# Patient Record
Sex: FEMALE | Race: BLACK OR AFRICAN AMERICAN | Employment: OTHER | URBAN - METROPOLITAN AREA
[De-identification: names, ages, dates, MRNs, and addresses within clinical notes are randomized per-mention and may not be internally consistent; named-entity substitution may affect disease eponyms.]

---

## 2017-05-22 ENCOUNTER — HOSPITAL ENCOUNTER (OUTPATIENT)
Dept: MAMMOGRAPHY | Age: 62
Discharge: HOME | End: 2017-05-22
Attending: INTERNAL MEDICINE
Payer: COMMERCIAL

## 2017-05-22 DIAGNOSIS — C50.919 BREAST CA (HCC): ICD-10-CM

## 2017-05-22 PROCEDURE — 77066 DX MAMMO INCL CAD BI: CPT

## 2017-05-22 PROCEDURE — 77067 SCR MAMMO BI INCL CAD: CPT

## 2018-05-23 ENCOUNTER — HOSPITAL ENCOUNTER (OUTPATIENT)
Dept: MAMMOGRAPHY | Age: 63
Discharge: HOME | End: 2018-05-23
Attending: INTERNAL MEDICINE
Payer: COMMERCIAL

## 2018-05-23 DIAGNOSIS — Z86.000 HX OF CARCINOMA IN SITU OF BREAST: ICD-10-CM

## 2018-05-23 DIAGNOSIS — Z12.31 ENCOUNTER FOR SCREENING MAMMOGRAM FOR BREAST CANCER: ICD-10-CM

## 2018-05-23 PROCEDURE — 77063 BREAST TOMOSYNTHESIS BI: CPT

## 2019-03-15 ENCOUNTER — OFFICE VISIT (OUTPATIENT)
Dept: ORTHOPEDIC SURGERY | Age: 64
End: 2019-03-15

## 2019-03-15 VITALS
OXYGEN SATURATION: 98 % | RESPIRATION RATE: 16 BRPM | DIASTOLIC BLOOD PRESSURE: 80 MMHG | SYSTOLIC BLOOD PRESSURE: 134 MMHG | HEART RATE: 67 BPM | HEIGHT: 63 IN | BODY MASS INDEX: 34.91 KG/M2 | WEIGHT: 197 LBS

## 2019-03-15 DIAGNOSIS — M54.16 LUMBAR RADICULOPATHY: Primary | ICD-10-CM

## 2019-03-15 RX ORDER — BACLOFEN 10 MG/1
10 TABLET ORAL 3 TIMES DAILY
Qty: 90 TAB | Refills: 0 | Status: SHIPPED | OUTPATIENT
Start: 2019-03-15 | End: 2022-08-17

## 2019-03-15 RX ORDER — METHYLPREDNISOLONE 4 MG/1
4 TABLET ORAL
Qty: 21 TAB | Refills: 0 | Status: SHIPPED | OUTPATIENT
Start: 2019-03-15 | End: 2019-05-22

## 2019-03-15 NOTE — PROGRESS NOTES
Grey Burkitt  1955   Chief Complaint   Patient presents with    Leg Pain     right leg pain         HISTORY OF PRESENT ILLNESS  Grey Burkitt is a 70 y.o. female who presents today for evaluation of right leg pain. She states it started about 3 weeks ago. Notes it could be from her dancing. Pain goes from her buttock down to her foot. Describes as a dull ache. Worse with activity better when she lays down. Has used icy hot and motrins and oxycodone. Pain is a 10/10.      No Known Allergies     Past Medical History:   Diagnosis Date    Anemia     Arthritis     Cancer ([facility] 2014    breast    Ductal carcinoma in situ of breast     Glaucoma     Hypertension     Seizures ([facility] [address] [facility]     PAST      Social History     Socioeconomic History    Marital status: SINGLE     Spouse name: Not on file    Number of children: Not on file    Years of education: Not on file    Highest education level: Not on file   Social Needs    Financial resource strain: Not on file    Food insecurity - worry: Not on file    Food insecurity - inability: Not on file   [employer] needs - medical: Not on file   [employer] needs - non-medical: Not on file   Occupational History    Not on file   Tobacco Use    Smoking status: Never Smoker    Smokeless tobacco: Never Used   Substance and Sexual Activity    Alcohol use: No     Alcohol/week: 0.0 oz    Drug use: No    Sexual activity: Not on file   Other Topics Concern    Not on file   Social History Narrative    Not on file      Past Surgical History:   Procedure Laterality Date    HX HYSTERECTOMY      partial    HX MASTECTOMY  4/30/2014    RIGHT PARTIAL MASTECTOMY WITH NEEDLE LOCALIZATION MAMMOGRAM SENTINEL LYMPH NODE BIOPSY performed by Romayne Bennetts, MD at [facility] MAIN OR      Family History   Problem Relation Age of Onset    Colon Cancer Mother       Current Outpatient Medications   Medication Sig    baclofen (LIORESAL) 10 mg tablet Take 1 Tab by mouth three (3) times daily.  methylPREDNISolone (MEDROL DOSEPACK) 4 mg tablet Take 1 Tab by mouth Specific Days and Specific Times.  tamoxifen (NOLVADEX) 10 mg tablet Take  by mouth daily.  OTHER Vitamin D Po    nebivolol (BYSTOLIC) 20 mg tablet Take  by mouth daily.  torsemide (DEMADEX) 20 mg tablet Take  by mouth daily.  OTHER Occuvite eye vitamin OTC    cyanocobalamin (VITAMIN B-12) 2,500 mcg sublingual tablet Take 2,500 mcg by mouth daily.  pyridoxine (VITAMIN B-6) 200 mg tablet Take 200 mg by mouth daily.  BIOTIN PO Take 1,000 mg by mouth.  oxyCODONE-acetaminophen (PERCOCET) 5-325 mg per tablet 1 or 2 tablets by mouth every 4 hours as needed     No current facility-administered medications for this visit. REVIEW OF SYSTEM   Patient denies: Weight loss, Fever/Chills, HA, Visual changes, Fatigue, Chest pain, SOB, Abdominal pain, N/V/D/C, Blood in stool or urine, Edema. Pertinent positive as above in HPI. All others were negative    PHYSICAL EXAM:   Visit Vitals  /80   Pulse 67   Resp 16   Ht 5' 3\" (1.6 m)   Wt 197 lb (89.4 kg)   SpO2 98%   BMI 34.90 kg/m²     The patient is a well-developed, well-nourished female   in no acute distress. The patient is alert and oriented times three. The patient is alert and oriented times three. Mood and affect are normal.  LYMPHATIC: lymph nodes are not enlarged and are within normal limits  SKIN: normal in color and non tender to palpation. There are no bruises or abrasions noted. NEUROLOGICAL: Motor sensory exam is within normal limits. Reflexes are equal bilaterally.  There is normal sensation to pinprick and light touch  MUSCULOSKELETAL:  Examination Lumbar   Skin Intact   Tenderness, Paraspinal muscles +   Paraspinal muscle spasms +   Flexion Fingertips to ankle   Extension 10   Knee reflexes Normal   Ankle reflexes Normal   Straight leg raise -   Calf tenderness -         IMAGIN view xray images of lumbar spine on 3/15/2019 read and reviewed by myself reveal significant lumbar degenerative arthritis     IMPRESSION:      ICD-10-CM ICD-9-CM    1. Lumbar radiculopathy M54.16 724.4 AMB POC XRAY, SPINE, LUMBOSACRAL; 2 O      REFERRAL TO PHYSICAL THERAPY        PLAN:   1. Patient with right radicular pain consistent with degenerative changes from back. Medrol dose pack, baclofen, physical therapy  Risk factors include: HTN, BMI>30  2. No cortisone injection indicated today   3. Yes Physical/Occupational Therapy indicated today: lumbar spine  4. No diagnostic test indicated today:   5. No durable medical equipment indicated today  6. No referral indicated today   7. Yes medications indicated today: MEDROL dose pack and baclofen  8.  No Narcotic indicated today      RTC 3 weeks    Follow-up Disposition: Not on [address] [facility]

## 2019-03-18 ENCOUNTER — HOSPITAL ENCOUNTER (OUTPATIENT)
Dept: PHYSICAL THERAPY | Age: 64
Discharge: HOME | End: 2019-03-18
Payer: MEDICARE

## 2019-03-18 PROCEDURE — 97162 PT EVAL MOD COMPLEX 30 MIN: CPT

## 2019-03-18 PROCEDURE — 97110 THERAPEUTIC EXERCISES: CPT

## 2019-03-18 NOTE — PROGRESS NOTES
[facility]  [address]  [place], [address]  [phone] fax    Plan of Care/ Statement of Necessity for Physical Therapy Services    Patient name: Eliud Castillo Start of Care: 3/18/2019   Referral source: Raegan Hermosillo : 1955    Medical Diagnosis: Radiculopathy, lumbar region [M54.16]  Payor: [place] MEDICARE / Plan: [place] MEDICARE PART A & B / Product Type: Medicare /  Onset Date:    Treatment Diagnosis: Left leg pain   Prior Hospitalization: see medical history Provider#: [id number]   Medications: Verified on Patient summary List    Comorbidities: depression, arthritis, visual impairments, cancer, high blood pressure   Prior Level of Function: (I) with all ADLs, and able to walk pain free      The Plan of Care and following information is based on the information from the initial evaluation. Assessment/ key information: Patient is a 70 y.o female presenting with a chief complaint of Right buttock pain that travels all the way down the leg that began a couple of months ago, which she thinks started after doing a lot of dancing. Patient states recent xrays show lumbar arthritis. Patient reports sharp shooting pain with all movement as well as with sitting on the right buttock. Patient reports she has been dealing with depression and recently stopped exercising. Patient with several mood swings throughout session. Patient reports with decreased hip mobility, decreased strength, myofascial restrictions in right glutes, decreased muscular flexibility, and gait abnormalities. Patient will benefit from skilled physical therapy in order to address the above impairments.    Evaluation Complexity History MEDIUM  Complexity : 1-2 comorbidities / personal factors will impact the outcome/ POC ; Examination MEDIUM Complexity : 3 Standardized tests and measures addressing body structure, function, activity limitation and / or participation in recreation  ;Presentation MEDIUM Complexity : Evolving with changing characteristics  ; Clinical Decision Making MEDIUM Complexity : FOTO score of 26-74  Overall Complexity Rating: MEDIUM  Problem List: pain affecting function, decrease ROM, decrease strength, impaired gait/ balance, decrease ADL/ functional abilitiies, decrease activity tolerance, decrease flexibility/ joint mobility and decrease transfer abilities   Treatment Plan may include any combination of the following: Therapeutic exercise, Neuromuscular re-education, Physical agent/modality, Gait/balance training, Manual therapy, Patient education, Functional mobility training and Stair training  Patient / Family readiness to learn indicated by: asking questions, trying to perform skills and interest  Persons(s) to be included in education: patient (P)  Barriers to Learning/Limitations: None  Patient Goal (s): no pain and to walk normal again  Patient Self Reported Health Status: good  Rehabilitation Potential: good    Short Term Goals: To be accomplished in 4 weeks:   1. Patient will be independent and compliant with HEP in order to improve functional mobility. 2. Patient will be able to decrease pain to no more than 5/10 in order to improve ease of ADLs. Long Term Goals: To be accomplished in 8 weeks:   1. Patient will be able to improve FOTO score to 66 in order to demonstrate improvements in functional independence. 2. Patient will be able to improve right hip ER to 30 degrees in order to improve ease of ADLs. 3. Patient will be able to climb a flight of stairs without difficulty in order to return to prior level of function. Frequency / Duration: Patient to be seen 2 times per week for 8 weeks.     Patient/ Caregiver education and instruction: Diagnosis, prognosis, activity modification and exercises   [x]  Plan of care has been reviewed with PTA    Certification Period: 3/18/19 - 5/17/19  Gadiel Dodd, PT 3/18/2019 3:06 PM    ________________________________________________________________________    I certify that the above Therapy Services are being furnished while the patient is under my care. I agree with the treatment plan and certify that this therapy is necessary.     [de-identified] Signature:____________________  Date:____________Time: _________    Please sign and return to [facility] [address] [address] [address] Mallory Alvarez [address] [place] [address] [address]  [phone] fax

## 2019-03-18 NOTE — PROGRESS NOTES
PT DAILY TREATMENT NOTE 10-18    Patient Name: John Dolan  Date:3/18/2019  : 1955  [x]  Patient  Verified  Payor: [place] MEDICARE / Plan: [place] MEDICARE PART A & B / Product Type: Medicare /    In time:2:10  Out time:2:57  Total Treatment Time (min): 52  Visit #: 1 of 16    Medicare/BCBS Only   Total Timed Codes (min):  27 1:1 Treatment Time:  47       Treatment Area: Radiculopathy, lumbar region [M54.16]    SUBJECTIVE  Pain Level (0-10 scale): 9  Any medication changes, allergies to medications, adverse drug reactions, diagnosis change, or new procedure performed?: [x] No    [] Yes (see summary sheet for update)  Subjective functional status/changes:   [] No changes reported  Patient is a 70 y.o female presenting with a chief complaint of Right buttock pain that travels all the way down the leg that began a couple of months ago, which she thinks started after doing a lot of dancing. Patient states recent xrays show lumbar arthritis. Patient reports sharp shooting pain with all movement as well as with sitting on the right buttock. Patient reports she has been dealing with depression and recently stopped exercising. Patient with several mood swings throughout session. OBJECTIVE      20 min [x]Eval                  []Re-Eval       27 min Therapeutic Exercise:  [x] See flow sheet :   Rationale: increase ROM and increase strength to improve the patients ability to perform ADLs with improved ease. With   [] TE   [] TA   [] neuro   [] other: Patient Education: [x] Review HEP    [] Progressed/Changed HEP based on:   [] positioning   [] body mechanics   [] transfers   [] heat/ice application    [] other:      Other Objective/Functional Measures: See IE     Pain Level (0-10 scale) post treatment: 9    ASSESSMENT/Changes in Function: Patient reports with decreased hip mobility, decreased strength, myofascial restrictions in right glutes, decreased muscular flexibility, and gait abnormalities. Patient will benefit from skilled physical therapy in order to address the above impairments. Patient will continue to benefit from skilled PT services to modify and progress therapeutic interventions, address functional mobility deficits, address ROM deficits, address strength deficits, analyze and address soft tissue restrictions, analyze and cue movement patterns, analyze and modify body mechanics/ergonomics and assess and modify postural abnormalities to attain remaining goals. [x]  See Plan of Care  []  See progress note/recertification  []  See Discharge Summary         Progress towards goals / Updated goals:  Short Term Goals: To be accomplished in 4 weeks:              1. Patient will be independent and compliant with HEP in order to improve functional mobility. 2. Patient will be able to decrease pain to no more than 5/10 in order to improve ease of ADLs. Long Term Goals: To be accomplished in 8 weeks:              1. Patient will be able to improve FOTO score to 66 in order to demonstrate improvements in functional independence. 2. Patient will be able to improve right hip ER to 30 degrees in order to improve ease of ADLs. 3. Patient will be able to climb a flight of stairs without difficulty in order to return to prior level of function.      PLAN  []  Upgrade activities as tolerated     [x]  Continue plan of care  []  Update interventions per flow sheet       []  Discharge due to:_  []  Other:_      Armando Flores PT 3/18/2019  3:06 PM    Future Appointments   Date Time Provider Federico Russell   4/16/2019  8:45 AM SRINIVASAN [address]   5/28/2019  7:30 AM HBV ESHA MARTINEZO RM [facility]

## 2019-03-21 ENCOUNTER — HOSPITAL ENCOUNTER (OUTPATIENT)
Dept: PHYSICAL THERAPY | Age: 64
Discharge: HOME | End: 2019-03-21
Payer: MEDICARE

## 2019-03-21 PROCEDURE — 97110 THERAPEUTIC EXERCISES: CPT

## 2019-03-21 PROCEDURE — 97140 MANUAL THERAPY 1/> REGIONS: CPT

## 2019-03-21 PROCEDURE — 97112 NEUROMUSCULAR REEDUCATION: CPT

## 2019-03-21 NOTE — PROGRESS NOTES
PT DAILY TREATMENT NOTE 10-18 Patient Name: Morenita Mcconnell Date:3/21/2019 : 1955 [x]  Patient  Verified Payor: [place] MEDICARE / Plan: Barbara [place] j[place] / Product Type: Medicare / In time:8:22  Out time:9:09 Total Treatment Time (min): 47 Visit #: 2 of 16 Medicare/BCBS Only Total Timed Codes (min):  47 1:1 Treatment Time:  47  
 
 
Treatment Area: Low back pain [M54.5] SUBJECTIVE Pain Level (0-10 scale): 6-7 Any medication changes, allergies to medications, adverse drug reactions, diagnosis change, or new procedure performed?: [x] No    [] Yes (see summary sheet for update) Subjective functional status/changes:   [] No changes reported Pt reports that her pain was around a 9 but \"the medicine is still in me\" so she reports 6-7/10 levels at this time OBJECTIVE Modality rationale: PD to improve the patients ability to Min Type Additional Details  
 [] Estim:  []Unatt       []IFC  []Premod []Other:  []w/ice   []w/heat Position: Location:  
 [] Estim: []Att    []TENS instruct  []NMES []Other:  []w/US   []w/ice   []w/heat Position: Location:  
 []  Traction: [] Cervical       []Lumbar 
                     [] Prone          []Supine []Intermittent   []Continuous Lbs: 
[] before manual 
[] after manual  
 []  Ultrasound: []Continuous   [] Pulsed []1MHz   []3MHz W/cm2: 
Location:  
 []  Iontophoresis with dexamethasone Location: [] Take home patch  
[] In clinic  
 []  Ice     []  heat 
[]  Ice massage 
[]  Laser  
[]  Anodyne Position: Location:  
 []  Laser with stim 
[]  Other:  Position: Location:  
 []  Vasopneumatic Device Pressure:       [] lo [] med [] hi  
Temperature: [] lo [] med [] hi  
[] Skin assessment post-treatment:  []intact []redness- no adverse reaction 
  []redness  adverse reaction:  
 
27 min Therapeutic Exercise:  [] See flow sheet :  
 Rationale: increase ROM and increase strength to improve the patients ability to perform daily tasks with increased ease 12 min Neuromuscular Re-education:  []  See flow sheet :  
Rationale: improve coordination and increase proprioception  to improve the patients ability to perform functional tasks with improved lumbopelvic mechanics 8 min Manual Therapy:  TPR right glutes, right hip mobility assessment Rationale: decrease pain, increase tissue extensibility and decrease trigger points to improve ease of ADL performance without LE paresthesias With 
 [] TE 
 [] TA 
 [] neuro 
 [] other: Patient Education: [x] Review HEP [] Progressed/Changed HEP based on:  
[] positioning   [] body mechanics   [] transfers   [] heat/ice application   
[] other:   
 
Other Objective/Functional Measures: right hip IR/ER PROM good today, some limitations into end range flexion Pain Level (0-10 scale) post treatment: 0 
 
ASSESSMENT/Changes in Function: Pt with good tolerance to first f/u session, extensive education on self care and exercise technique. Encouraged pt to attempt HEP to improve carryover. Good pain control reported post session. Pt does have some dizziness after transferring from supine to sit, she was able to leave without issue and reported decreased dizziness. Patient will continue to benefit from skilled PT services to modify and progress therapeutic interventions, address functional mobility deficits, address ROM deficits, address strength deficits, analyze and address soft tissue restrictions, analyze and cue movement patterns and analyze and modify body mechanics/ergonomics to attain remaining goals. []  See Plan of Care 
[]  See progress note/recertification 
[]  See Discharge Summary Progress towards goals / Updated goals: 
Short Term Goals: To be accomplished in 4 weeks: 
            1.  Patient will be independent and compliant with HEP in order to improve functional mobility. Not met- pt reports being busy 3/21/19  
            2. Patient will be able to decrease pain to no more than 5/10 in order to improve ease of ADLs. Long Term Goals: To be accomplished in 8 weeks: 
            1. Patient will be able to improve FOTO score to 66 in order to demonstrate improvements in functional independence. 
            2. Patient will be able to improve right hip ER to 30 degrees in order to improve ease of ADLs.             3. Patient will be able to climb a flight of stairs without difficulty in order to return to prior level of function. PLAN 
[]  Upgrade activities as tolerated     [x]  Continue plan of care 
[]  Update interventions per flow sheet      
[]  Discharge due to:_ 
[]  Other:_ Diego Pearl DPT, CMTPT 3/21/2019  8:27 AM 
 
Future Appointments Date Time Provider Federico Russell 3/21/2019  [facility]30 [facility]  
3/25/2019  8:30 AM [facility]  
3/28/2019 10:00 AM [facility]  
4/1/2019  8:00 AM [facility]  
4/3/2019  9:30 AM Leonard [facility]  
4/16/2019  8:45 AM SRINIVASAN Oswald [facility]  
5/28/2019  7:30 AM GIANCARLO TOMAS [facility]

## 2019-03-25 ENCOUNTER — HOSPITAL ENCOUNTER (OUTPATIENT)
Dept: PHYSICAL THERAPY | Age: 64
Discharge: HOME | End: 2019-03-25
Payer: MEDICARE

## 2019-03-25 PROCEDURE — 97112 NEUROMUSCULAR REEDUCATION: CPT

## 2019-03-25 PROCEDURE — 97110 THERAPEUTIC EXERCISES: CPT

## 2019-03-25 PROCEDURE — 97140 MANUAL THERAPY 1/> REGIONS: CPT

## 2019-03-25 NOTE — PROGRESS NOTES
PT DAILY TREATMENT NOTE 10-18 Patient Name: Will Ibrahim Date:3/25/2019 : 1955 [x]  Patient  Verified Payor: [place] MEDICARE / Plan: Barbara Hooker / Product Type: Medicare / In time:8:23  Out time:9:04 Total Treatment Time (min): 41 Visit #: 3 of 16 Medicare/BCBS Only Total Timed Codes (min):  41 1:1 Treatment Time:  41  
 
 
Treatment Area: Low back pain [M54.5] SUBJECTIVE Pain Level (0-10 scale): 1-2 Any medication changes, allergies to medications, adverse drug reactions, diagnosis change, or new procedure performed?: [x] No    [] Yes (see summary sheet for update) Subjective functional status/changes:   [] No changes reported \"To tell you the truth the hip pain hasnt been that bad\". OBJECTIVE 23 min Therapeutic Exercise:  [x] See flow sheet :  
Rationale: increase ROM and increase strength to improve the patients ability to perform ADLs with improved ease. 10 min Neuromuscular Re-education:  [x]  See flow sheet :  
Rationale: increase strength, improve coordination and increase proprioception  to improve the patients ability to perform functional activities with improved mechanics. 8 min Manual Therapy:  STM Right glutes Rationale: decrease pain, increase ROM and increase tissue extensibility to improve patients functional mobility. With 
 [] TE 
 [] TA 
 [] neuro 
 [] other: Patient Education: [x] Review HEP [] Progressed/Changed HEP based on:  
[] positioning   [] body mechanics   [] transfers   [] heat/ice application   
[] other:   
 
Other Objective/Functional Measures: Added seated hip IR/ER with theraband today. Pain Level (0-10 scale) post treatment: 0 
 
ASSESSMENT/Changes in Function: Patient presents with decreased pain levels today stating that she has noticed improvements in intensity and frequency of pain.  Patient tolerates all exercises without increase in pain, requiring moderate cueing in order to facilitate proper mechanics. Decreased glute activation noted. Patient will continue to benefit from skilled PT services to modify and progress therapeutic interventions, address functional mobility deficits, address ROM deficits, address strength deficits, analyze and address soft tissue restrictions, analyze and cue movement patterns, analyze and modify body mechanics/ergonomics and assess and modify postural abnormalities to attain remaining goals. [x]  See Plan of Care 
[]  See progress note/recertification 
[]  See Discharge Summary Progress towards goals / Updated goals: 
Short Term Goals: To be accomplished in 4 weeks: 
            1. Patient will be independent and compliant with HEP in order to improve functional mobility. Not met- pt reports being busy 3/21/19  
            2. Patient will be able to decrease pain to no more than 5/10 in order to improve ease of ADLs. 3/25/19 - patient reports 1-2/10 pain today. Long Term Goals: To be accomplished in 8 weeks: 
            1. Patient will be able to improve FOTO score to 66 in order to demonstrate improvements in functional independence. 
            2. Patient will be able to improve right hip ER to 30 degrees in order to improve ease of ADLs.             3. Patient will be able to climb a flight of stairs without difficulty in order to return to prior level of function.  PLAN 
[]  Upgrade activities as tolerated     [x]  Continue plan of care 
[]  Update interventions per flow sheet      
[]  Discharge due to:_ 
[]  Other:_ Alexandra Sibley, PT 3/25/2019  8:26 AM 
 
Future Appointments Date Time Provider Federico Russell 3/25/2019  8:30 AM [facility]  
3/28/2019 10:00 AM [facility]  
4/1/2019  8:00 AM [facility]  
4/3/2019  9:30 AM [facility]  
 4/16/2019  8:45 [facility]  
5/28/2019  7:30 AM HBV ESHA TOMAS [facility]

## 2019-03-28 ENCOUNTER — HOSPITAL ENCOUNTER (OUTPATIENT)
Dept: PHYSICAL THERAPY | Age: 64
Discharge: HOME | End: 2019-03-28
Payer: MEDICARE

## 2019-03-28 PROCEDURE — 97140 MANUAL THERAPY 1/> REGIONS: CPT

## 2019-03-28 PROCEDURE — 97110 THERAPEUTIC EXERCISES: CPT

## 2019-03-28 NOTE — PROGRESS NOTES
PT DAILY TREATMENT NOTE 10-18 Patient Name: Linsey Braxton Date:3/28/2019 : 1955 [x]  Patient  Verified Payor: [place] / Plan: Barbara Hooker / Product Type: Medicare / In time:9:52  Out time:10:33 Total Treatment Time (min): 41 Visit #: 4 of 16 Medicare/BCBS Only Total Timed Codes (min):  41 1:1 Treatment Time:  41  
 
 
Treatment Area: Low back pain [M54.5] SUBJECTIVE Pain Level (0-10 scale): 2 Any medication changes, allergies to medications, adverse drug reactions, diagnosis change, or new procedure performed?: [x] No    [] Yes (see summary sheet for update) Subjective functional status/changes:   [] No changes reported \"A little pain yesterday, but much better when I woke up this morning\". OBJECTIVE 33 min Therapeutic Exercise:  [x] See flow sheet :  
Rationale: increase ROM and increase strength to improve the patients ability to perform ADLs with improved ease. 8 min Manual Therapy:  STM right glutes/piriformis, roller stick to right glutes and IT band Rationale: decrease pain, increase ROM and increase tissue extensibility to improve patients functional mobility. With 
 [] TE 
 [] TA 
 [] neuro 
 [] other: Patient Education: [x] Review HEP [] Progressed/Changed HEP based on:  
[] positioning   [] body mechanics   [] transfers   [] heat/ice application   
[] other:   
 
Other Objective/Functional Measures:   
 
Pain Level (0-10 scale) post treatment: 0 
 
ASSESSMENT/Changes in Function: Patient is progressing well toward established goals. Decreased myofascial restrictions in right glutes noted.   
 
Patient will continue to benefit from skilled PT services to modify and progress therapeutic interventions, address functional mobility deficits, address ROM deficits, address strength deficits, analyze and address soft tissue restrictions, analyze and cue movement patterns, analyze and modify body mechanics/ergonomics and assess and modify postural abnormalities to attain remaining goals. [x]  See Plan of Care 
[]  See progress note/recertification 
[]  See Discharge Summary Progress towards goals / Updated goals: 
Short Term Goals: To be accomplished in 4 weeks: 
            1. Patient will be independent and compliant with HEP in order to improve functional mobility. Not met- pt reports being busy 3/21/19  
            2. Patient will be able to decrease pain to no more than 5/10 in order to improve ease of ADLs. 3/25/19 - patient reports 1-2/10 pain today. Long Term Goals: To be accomplished in 8 weeks: 
            1. Patient will be able to improve FOTO score to 66 in order to demonstrate improvements in functional independence. 
            2. Patient will be able to improve right hip ER to 30 degrees in order to improve ease of ADLs.             3. Patient will be able to climb a flight of stairs without difficulty in order to return to prior level of function.  PLAN 
[]  Upgrade activities as tolerated     [x]  Continue plan of care 
[]  Update interventions per flow sheet      
[]  Discharge due to:_ 
[]  Other:_ Suzette Malhotra, PT 3/28/2019  9:56 AM 
 
Future Appointments Date Time Provider Federico Russell 3/28/2019 10:00 [facility]  
4/1/2019  8:00 AM UGO Maza [facility]  
4/3/2019  9:30 AM Elvin Yanez [facility]  
4/16/2019  8:45 AM SRINIVASAN [place] VS [facility]  
5/28/2019  7:30 AM HBV ESHA TOMAS [facility]

## 2019-04-01 ENCOUNTER — HOSPITAL ENCOUNTER (OUTPATIENT)
Dept: PHYSICAL THERAPY | Age: 64
Discharge: HOME | End: 2019-04-01
Payer: MEDICARE

## 2019-04-01 PROCEDURE — 97140 MANUAL THERAPY 1/> REGIONS: CPT

## 2019-04-01 PROCEDURE — 97110 THERAPEUTIC EXERCISES: CPT

## 2019-04-01 NOTE — PROGRESS NOTES
PT DAILY TREATMENT NOTE 10-18 Patient Name: Telma Lorenzo Date:2019 : 1955 [x]  Patient  Verified Payor: [place] MEDICARE / Plan: Barbara Hooker / Product Type: Medicare / In time:8:00  Out time:8:39 Total Treatment Time (min): 39 Visit #: 5 of 16 Medicare/BCBS Only Total Timed Codes (min):  39 1:1 Treatment Time:  45 Treatment Area: Low back pain [M54.5] SUBJECTIVE Pain Level (0-10 scale): 8 Any medication changes, allergies to medications, adverse drug reactions, diagnosis change, or new procedure performed?: [x] No    [] Yes (see summary sheet for update) Subjective functional status/changes:   [] No changes reported \"My pain really spiked up this weekend\". OBJECTIVE 31 min Therapeutic Exercise:  [x] See flow sheet :  
Rationale: increase ROM and increase strength to improve the patients ability to perform ADLs with improved ease. 8 min Manual Therapy:  Roller stick to right glutes, IT band, quads, and hamstring, STM to right glutes/piriformis Rationale: decrease pain, increase ROM and increase tissue extensibility to improve patients functional mobility. With 
 [] TE 
 [] TA 
 [] neuro 
 [] other: Patient Education: [x] Review HEP [] Progressed/Changed HEP based on:  
[] positioning   [] body mechanics   [] transfers   [] heat/ice application   
[] other:   
 
Other Objective/Functional Measures:   
 
Pain Level (0-10 scale) post treatment: 5 
 
ASSESSMENT/Changes in Function: Patient with heightened pain levels today. Patient states she was able to tolerate all exercises with decreased pain level post session. Patient educated on activity modification in order to improve ease of ADLs. Cueing required during session in order for patient to maintain proper form.    
 
Patient will continue to benefit from skilled PT services to modify and progress therapeutic interventions, address functional mobility deficits, address ROM deficits, address strength deficits, analyze and address soft tissue restrictions, analyze and cue movement patterns, analyze and modify body mechanics/ergonomics and assess and modify postural abnormalities to attain remaining goals. [x]  See Plan of Care 
[]  See progress note/recertification 
[]  See Discharge Summary Progress towards goals / Updated goals: 
Short Term Goals: To be accomplished in 4 weeks: 
            1. Patient will be independent and compliant with HEP in order to improve functional mobility. Not met- pt reports being busy 3/21/19  
            2. Patient will be able to decrease pain to no more than 5/10 in order to improve ease of ADLs. 3/25/19 - patient reports 1-2/10 pain today.  
[address] be accomplished in 8 weeks: 
            1. Patient will be able to improve FOTO score to 66 in order to demonstrate improvements in functional independence. 
            2. Patient will be able to improve right hip ER to 30 degrees in order to improve ease of ADLs.             3. Patient will be able to climb a flight of stairs without difficulty in order to return to prior level of function.  
  
 
PLAN 
[]  Upgrade activities as tolerated     [x]  Continue plan of care 
[]  Update interventions per flow sheet      
[]  Discharge due to:_ 
[]  Other:_ Breanna Palacios, PT 4/1/2019  8:06 AM 
 
Future Appointments Date Time Provider Federico Russell 4/3/2019  9:30 AM Dulce Maximilian [facility]  
4/11/2019  8:30 AM Dulce Maximilian [facility]  
4/16/2019  8:45 AM SRINIVASAN [address]  
4/18/2019  9:00 AM Malihadanielle Cortez, PT [facility]  
4/22/2019  8:30 AM Maliha Dana, PT [facility]  
4/25/2019  8:30 AM Dulcejasmyn Yao [facility]  
4/29/2019  8:30 AM Malihadanielle Cortez, PT [facility]  
5/2/2019  8:30 AM Maliha Dana MULLINS, PT [facility]  
5/28/2019  7:30 AM HBV ESHA LUZ [facility]

## 2019-04-03 ENCOUNTER — HOSPITAL ENCOUNTER (OUTPATIENT)
Dept: PHYSICAL THERAPY | Age: 64
Discharge: HOME | End: 2019-04-03
Payer: MEDICARE

## 2019-04-03 PROCEDURE — 97140 MANUAL THERAPY 1/> REGIONS: CPT

## 2019-04-03 PROCEDURE — 97110 THERAPEUTIC EXERCISES: CPT

## 2019-04-03 PROCEDURE — 97112 NEUROMUSCULAR REEDUCATION: CPT

## 2019-04-03 NOTE — PROGRESS NOTES
PT DAILY TREATMENT NOTE 10-18 Patient Name: July Petersen Date:4/3/2019 : 1955 [x]  Patient  Verified Payor: [place] MEDICARE / Plan: aBrbara Del Cidy / Product Type: Medicare / In time:9:30  Out time:10:20 Total Treatment Time (min): 50 Visit #: 6 of 16 Medicare/BCBS Only Total Timed Codes (min):  50 1:1 Treatment Time:  44 Treatment Area: Low back pain [M54.5] SUBJECTIVE Pain Level (0-10 scale): 2 Any medication changes, allergies to medications, adverse drug reactions, diagnosis change, or new procedure performed?: [x] No    [] Yes (see summary sheet for update) Subjective functional status/changes:   [] No changes reported Pt reports that she had some throat soreness and took medication which has relieved all of her pain. OBJECTIVE 34 min Therapeutic Exercise:  [] See flow sheet :  
Rationale: increase ROM and increase strength to improve the patients ability to perform daily tasks and ADLs 8 min Neuromuscular Re-education:  []  See flow sheet :  
Rationale: improve coordination and increase proprioception  to improve the patients ability to perform functional tasks 8 min Manual Therapy:  TPR right glutes, stick IASTM right glutes/vastus Rationale: increase ROM and increase tissue extensibility to improve ease of self care With 
 [] TE 
 [] TA 
 [] neuro 
 [] other: Patient Education: [x] Review HEP [] Progressed/Changed HEP based on:  
[] positioning   [] body mechanics   [] transfers   [] heat/ice application   
[] other:   
 
Other Objective/Functional Measures: pt did seem to have a bit of concern with being started by a technician today, technician did excellent job of explaining her role to patient.  Pt reports that she understands this role and is respectful of therapist-technician relationship able to leave clinic with positive demeanor towards therapist and technician 
 
Pain Level (0-10 scale) post treatment: 0 
 
 ASSESSMENT/Changes in Function: Pt requests to be challenged more with her exercise to improve carryover in symptoms. Discussed with patient gradual progression with therex to avoid excess overload and fluctuating progress, pt is agreeable. Pt reports some pain through right anterior tib region after standing therex, reports the leg feels heavy. Able to complete remainder of session without issue and void of pain at end of session. Patient will continue to benefit from skilled PT services to modify and progress therapeutic interventions, address functional mobility deficits, address ROM deficits, address strength deficits, analyze and address soft tissue restrictions, analyze and cue movement patterns, analyze and modify body mechanics/ergonomics and address imbalance/dizziness to attain remaining goals. []  See Plan of Care 
[]  See progress note/recertification 
[]  See Discharge Summary Progress towards goals / Updated goals: 
Short Term Goals: To be accomplished in 4 weeks: 
            1. Patient will be independent and compliant with HEP in order to improve functional mobility. Not met- pt reports being busy 3/21/19  
            2. Patient will be able to decrease pain to no more than 5/10 in order to improve ease of ADLs. 3/25/19 - patient reports 1-2/10 pain today.  
[address] be accomplished in 8 weeks: 
            1. Patient will be able to improve FOTO score to 66 in order to demonstrate improvements in functional independence. 
            2. Patient will be able to improve right hip ER to 30 degrees in order to improve ease of ADLs. Progressing challenged with hip hip ER flexibility in various positions 4/3/19  
            3. Patient will be able to climb a flight of stairs without difficulty in order to return to prior level of function.  PLAN [x]  Upgrade activities as tolerated     []  Continue plan of care 
[]  Update interventions per flow sheet []  Discharge due to:_ 
[]  Other:_ Jonny Rivera, DPERIKA, CMTPT 4/3/2019  9:38 AM 
 
Future Appointments Date Time Provider Federico Russell 4/11/2019  8:30 AM Hannahagnes Silva [facility]  
4/16/2019  8:45 AM SRINIVASAN [address]  
4/18/2019  9:00 AM Diomedes [place], PT [facility]  
4/22/2019  8:30 AM Diomedes [place], PT [facility]  
4/25/2019  8:30 AM Hannah Silva [facility]  
4/29/2019  8:30 AM Diomedes [place], PT [facility]  
5/2/2019  8:30 AM Diomedesamrik Perez A, PT [facility]  
5/28/2019  7:30 AM HBV ESHA ESCOBAR [facility]

## 2019-04-09 ENCOUNTER — HOSPITAL ENCOUNTER (OUTPATIENT)
Dept: PHYSICAL THERAPY | Age: 64
Discharge: HOME | End: 2019-04-09
Payer: MEDICARE

## 2019-04-09 PROCEDURE — 97140 MANUAL THERAPY 1/> REGIONS: CPT

## 2019-04-09 PROCEDURE — 97112 NEUROMUSCULAR REEDUCATION: CPT

## 2019-04-09 PROCEDURE — 97110 THERAPEUTIC EXERCISES: CPT

## 2019-04-09 NOTE — PROGRESS NOTES
PT DAILY TREATMENT NOTE 10-18 Patient Name: Myrna Dahl Date:2019 : 1955 [x]  Patient  Verified Payor: [place] MEDICARE / Plan: Barbara Del Cidy / Product Type: Medicare / In time:8:55  Out time:9:34 Total Treatment Time (min): 39 Visit #: 7 of 16 Medicare/BCBS Only Total Timed Codes (min):  39 1:1 Treatment Time:  44 Treatment Area: Low back pain [M54.5] SUBJECTIVE Pain Level (0-10 scale): 4 Any medication changes, allergies to medications, adverse drug reactions, diagnosis change, or new procedure performed?: [x] No    [] Yes (see summary sheet for update) Subjective functional status/changes:   [] No changes reported \"Honestly and truly, I feel good. \" Pt does reports some pain around the right knee region after working a lot yesterday OBJECTIVE 21 min Therapeutic Exercise:  [] See flow sheet :  
Rationale: increase ROM and increase strength to improve the patients ability to perform daily tasks and self care 10 min Neuromuscular Re-education:  []  See flow sheet :  
Rationale: improve coordination and increase proprioception  to improve the patients ability to perform daily tasks with improved lumbopelvic mechanics 8 min Manual Therapy:  Stick IASTM right glutes, TPR right glutes Rationale: decrease pain and increase tissue extensibility to improve ease of ADLs With 
 [] TE 
 [] TA 
 [] neuro 
 [] other: Patient Education: [x] Review HEP [] Progressed/Changed HEP based on:  
[] positioning   [] body mechanics   [] transfers   [] heat/ice application   
[] other:   
 
Other Objective/Functional Measures: pt challenged with positioning on Reformer for Fig-4 stretching today, reporting pain through right hip/lumbar region Pain Level (0-10 scale) post treatment: 0 
 
ASSESSMENT/Changes in Function: Pt a bit sore today thus held repetitions same as last visit.  Did add new Reformer activity for further posterior chain recruitment with fair tolerance. Progress with hip/lumbar mechanics and strength Patient will continue to benefit from skilled PT services to modify and progress therapeutic interventions, address functional mobility deficits, address ROM deficits, address strength deficits, analyze and address soft tissue restrictions, analyze and cue movement patterns and analyze and modify body mechanics/ergonomics to attain remaining goals. []  See Plan of Care 
[]  See progress note/recertification 
[]  See Discharge Summary Progress towards goals / Updated goals: 
Short Term Goals: To be accomplished in 4 weeks: 
            1. Patient will be independent and compliant with HEP in order to improve functional mobility. Not met- pt reports being busy 3/21/19  
            2. Patient will be able to decrease pain to no more than 5/10 in order to improve ease of ADLs. 3/25/19 - patient reports 1-2/10 pain today. Progressing able to perform full work day yesterday with <5/10 pain today 4/9/19 Long Term Goals: To be accomplished in 8 weeks: 
            1. Patient will be able to improve FOTO score to 66 in order to demonstrate improvements in functional independence. 
            2. Patient will be able to improve right hip ER to 30 degrees in order to improve ease of ADLs. Progressing challenged with hip hip ER flexibility in various positions 4/3/19 met 32 deg 4/9/19 
            3. Patient will be able to climb a flight of stairs without difficulty in order to return to prior level of function.  PLAN 
[]  Upgrade activities as tolerated     [x]  Continue plan of care 
[]  Update interventions per flow sheet      
[]  Discharge due to:_ 
[]  Other:_ Elliot Kelly DPT, KEVINPT 4/9/2019  8:59 AM 
 
Future Appointments Date Time Provider Federico Russell 4/9/2019  9:00 [facility]  
4/11/2019  8:30 AM [facility]  
 4/16/2019  8:45 AM SRINIVASAN [address]  
4/18/2019  9:00 AM Rosa Briones, PT [facility]  
4/22/2019  8:30 AM Rosa Briones, PT [facility]  
4/25/2019  8:30 AM Petra Villarreal [facility]  
4/29/2019  8:30 AM Rosa Briones, PT [facility]  
5/2/2019  8:30 AM Rosa MULLINS, PT [facility]  
5/28/2019  7:30 AM HBV ESHA LUZ [facility]

## 2019-04-11 ENCOUNTER — HOSPITAL ENCOUNTER (OUTPATIENT)
Dept: PHYSICAL THERAPY | Age: 64
Discharge: HOME | End: 2019-04-11
Payer: MEDICARE

## 2019-04-11 PROCEDURE — 97140 MANUAL THERAPY 1/> REGIONS: CPT

## 2019-04-11 PROCEDURE — 97110 THERAPEUTIC EXERCISES: CPT

## 2019-04-11 NOTE — PROGRESS NOTES
PT DAILY TREATMENT NOTE 10-18 Patient Name: Reji Starr Date:2019 : 1955 [x]  Patient  Verified Payor: [place] MEDICARE / Plan: Barbara Hooker / Product Type: Medicare / In time:8:30  Out time:9:09 Total Treatment Time (min): 39 Visit #: 8 of 16 Medicare/BCBS Only Total Timed Codes (min):  39 1:1 Treatment Time:  34 Treatment Area: Low back pain [M54.5] SUBJECTIVE Pain Level (0-10 scale): 9 Any medication changes, allergies to medications, adverse drug reactions, diagnosis change, or new procedure performed?: [x] No    [] Yes (see summary sheet for update) Subjective functional status/changes:   [] No changes reported Pt reports increased pain through distal right LE as of late. Reports her glute/hip is not that painful OBJECTIVE 21 min Therapeutic Exercise:  [] See flow sheet :  
Rationale: increase ROM and increase strength to improve the patients ability to perform daily tasks and self care 8 min Neuromuscular Re-education:  []  See flow sheet :  
Rationale: improve coordination and increase proprioception  to improve the patients ability to perform functional tasks with improved core activation and lumbopelvic awareness 10 min Manual Therapy:  Reassessment of right distal shank symptoms, DTM right glutes Rationale: decrease pain and increase tissue extensibility to improve ease of ADL performance With 
 [] TE 
 [] TA 
 [] neuro 
 [] other: Patient Education: [x] Review HEP [] Progressed/Changed HEP based on:  
[] positioning   [] body mechanics   [] transfers   [] heat/ice application   
[] other:   
 
Other Objective/Functional Measures: pt reporting increased right shank pain as of late 
-no signs of skin changes 
- no provocation with knee ROM and compression 
-no provocation/change with repeated trunk flexion Pain Level (0-10 scale) post treatment: 0 
 
 ASSESSMENT/Changes in Function: Pt reports improved right hip symptoms at this time but increased shank pain. Unable to provoke or change symptoms aside from hip IR/ER resistance testing in sitting and palpation to medial-lateral tibia. Pt to f/u with MD on 4/16, will assess response at that time Patient will continue to benefit from skilled PT services to modify and progress therapeutic interventions, address functional mobility deficits, address ROM deficits, address strength deficits, analyze and address soft tissue restrictions, analyze and cue movement patterns and analyze and modify body mechanics/ergonomics to attain remaining goals. []  See Plan of Care 
[]  See progress note/recertification 
[]  See Discharge Summary Progress towards goals / Updated goals: 
Short Term Goals: To be accomplished in 4 weeks: 
            1. Patient will be independent and compliant with HEP in order to improve functional mobility. Not met- pt reports being busy 3/21/19  
            2. Patient will be able to decrease pain to no more than 5/10 in order to improve ease of ADLs. 3/25/19 - patient reports 1-2/10 pain today. Progressing able to perform full work day yesterday with <5/10 pain today 4/9/19 Long Term Goals: To be accomplished in 8 weeks: 
            1. Patient will be able to improve FOTO score to 66 in order to demonstrate improvements in functional independence.  
            2. Patient will be able to improve right hip ER to 30 degrees in order to improve ease of ADLs. Progressing challenged with hip hip ER flexibility in various positions 4/3/19 met 32 deg 4/9/19 
            3. Patient will be able to climb a flight of stairs without difficulty in order to return to prior level of function.  PLAN [x]  Upgrade activities as tolerated     []  Continue plan of care 
[]  Update interventions per flow sheet      
[]  Discharge due to:_ 
[]  Other:_   
 
 Erasmo Segal, DPT, CMTPT 4/11/2019  8:36 AM 
 
Future Appointments Date Time Provider Federico Yvonne 4/16/2019  8:45 AM Luly SMITH, SRINIVASAN [address]  
4/18/2019  9:00 AM Shon Villegas, PT [facility]  
4/22/2019  8:30 AM Shon Ali, PT [facility]  
4/25/2019  8:30 AM Deanne Gayle [facility]  
4/29/2019  8:30 AM Shon Villegas, PT [facility]  
5/2/2019  8:30 AM Shon Villegas A, PT [facility]  
5/28/2019  7:30 AM HBV ESHA LUZ  [facility]

## 2019-04-16 ENCOUNTER — OFFICE VISIT (OUTPATIENT)
Dept: ORTHOPEDIC SURGERY | Age: 64
End: 2019-04-16

## 2019-04-16 VITALS
HEART RATE: 66 BPM | OXYGEN SATURATION: 97 % | RESPIRATION RATE: 16 BRPM | DIASTOLIC BLOOD PRESSURE: 74 MMHG | SYSTOLIC BLOOD PRESSURE: 129 MMHG | WEIGHT: 198 LBS | HEIGHT: 63 IN | BODY MASS INDEX: 35.08 KG/M2 | TEMPERATURE: 99.1 F

## 2019-04-16 DIAGNOSIS — M54.16 LUMBAR RADICULOPATHY: Primary | ICD-10-CM

## 2019-04-16 PROBLEM — E66.01 SEVERE OBESITY (HCC): Status: ACTIVE | Noted: 2019-04-16

## 2019-04-16 NOTE — PROGRESS NOTES
1. Have you been to the ER, urgent care clinic since your last visit? Hospitalized since your last visit? No    2. Have you seen or consulted any other health care providers outside of the [address] [place] [address] since your last visit? Include any pap smears or colon screening.  No

## 2019-04-16 NOTE — PROGRESS NOTES
Tasneem Ramos  1955   Chief Complaint   Patient presents with    Leg Pain     right leg pain, f/u appt. HISTORY OF PRESENT ILLNESS  Tasneem Ramos is a 70 y.o. female who presents today for evaluation of right leg pain. She states she is feeling slightly better today with the physical therapy. She notes some improvements after medrol dose pack and baclofen but they made her very drowsy. She feels like the pain goes all the way down her leg. She feels tightness in her leg. . Pain is a 6/10. Pain Assessment  4/16/2019   Location of Pain Leg   Pain Location Comment -   Location Modifiers Right   Severity of Pain 6   Quality of Pain Other (Comment); Throbbing   Quality of Pain Comment feels heavy    Duration of Pain A few minutes   Frequency of Pain Intermittent   Aggravating Factors Other (Comment); Standing   Aggravating Factors Comment prolonged sitting & when 1st getting up in the morning    Limiting Behavior Yes   Relieving Factors Exercises; Heat   Relieving Factors Comment moving around such as walking    Result of Injury No       No Known Allergies     Past Medical History:   Diagnosis Date    Anemia     Arthritis     Cancer ([facility] [address] 2014    breast    Ductal carcinoma in situ of breast     Glaucoma     Hypertension     [facility]     PAST      Social History     Socioeconomic History    Marital status: SINGLE     Spouse name: Not on file    Number of children: Not on file    Years of education: Not on file    Highest education level: Not on file   Occupational History    Not on file   Social Needs    Financial resource strain: Not on file    Food insecurity:     Worry: Not on file     Inability: Not on file    Transportation needs:     Medical: Not on file     Non-medical: Not on file   Tobacco Use    Smoking status: Never Smoker    Smokeless tobacco: Never Used   Substance and Sexual Activity    Alcohol use: No     Alcohol/week: 0.0 oz    Drug use: No    Sexual activity: Not on file   Lifestyle    Physical activity:     Days per week: Not on file     Minutes per session: Not on file    Stress: Not on file   Relationships    Social connections:     Talks on phone: Not on file     Gets together: Not on file     Attends Zoroastrian service: Not on file     Active member of club or organization: Not on file     Attends meetings of clubs or organizations: Not on file     Relationship status: Not on file    Intimate partner violence:     Fear of current or ex partner: Not on file     Emotionally abused: Not on file     Physically abused: Not on file     Forced sexual activity: Not on file   Other Topics Concern    Not on file   Social History Narrative    Not on file      Past Surgical History:   Procedure Laterality Date    HX HYSTERECTOMY      partial    HX MASTECTOMY  4/30/2014    RIGHT PARTIAL MASTECTOMY WITH NEEDLE LOCALIZATION MAMMOGRAM SENTINEL LYMPH NODE BIOPSY performed by Toni Jett MD at [facility] MAIN OR      Family History   Problem Relation Age of Onset    Colon Cancer Mother       Current Outpatient Medications   Medication Sig    tamoxifen (NOLVADEX) 10 mg tablet Take  by mouth daily.  OTHER Vitamin D Po    nebivolol (BYSTOLIC) 20 mg tablet Take  by mouth daily.  torsemide (DEMADEX) 20 mg tablet Take  by mouth daily.  OTHER Occuvite eye vitamin OTC    cyanocobalamin (VITAMIN B-12) 2,500 mcg sublingual tablet Take 2,500 mcg by mouth daily.  pyridoxine (VITAMIN B-6) 200 mg tablet Take 200 mg by mouth daily.  BIOTIN PO Take 1,000 mg by mouth.  baclofen (LIORESAL) 10 mg tablet Take 1 Tab by mouth three (3) times daily.  methylPREDNISolone (MEDROL DOSEPACK) 4 mg tablet Take 1 Tab by mouth Specific Days and Specific Times.  oxyCODONE-acetaminophen (PERCOCET) 5-325 mg per tablet 1 or 2 tablets by mouth every 4 hours as needed     No current facility-administered medications for this visit.         REVIEW OF SYSTEM   Patient denies: Weight loss, Fever/Chills, HA, Visual changes, Fatigue, Chest pain, SOB, Abdominal pain, N/V/D/C, Blood in stool or urine, Edema. Pertinent positive as above in HPI. All others were negative    PHYSICAL EXAM:   Visit Vitals  /74 (BP 1 Location: Left arm, BP Patient Position: Sitting)   Pulse 66   Temp 99.1 °F (37.3 °C) (Oral)   Resp 16   Ht 5' 3\" (1.6 m)   Wt 198 lb (89.8 kg)   SpO2 97%   BMI 35.07 kg/m²     The patient is a well-developed, well-nourished female   in no acute distress. The patient is alert and oriented times three. The patient is alert and oriented times three. Mood and affect are normal.  LYMPHATIC: lymph nodes are not enlarged and are within normal limits  SKIN: normal in color and non tender to palpation. There are no bruises or abrasions noted. NEUROLOGICAL: Motor sensory exam is within normal limits. Reflexes are equal bilaterally. There is normal sensation to pinprick and light touch  MUSCULOSKELETAL:  Examination Lumbar   Skin Intact   Tenderness, Paraspinal muscles +   Paraspinal muscle spasms +   Flexion Fingertips to ankle   Extension 10   Knee reflexes Normal   Ankle reflexes Normal   Straight leg raise -   Calf tenderness -           IMPRESSION:      ICD-10-CM ICD-9-CM    1. Lumbar radiculopathy M54.16 724.4 REFERRAL TO SPINE SURGERY        PLAN:   1. Patient with right radicular pain consistent with degenerative changes from back. will cont with PT and refer to spine center. Risk factors include: HTN, BMI>30  2. No cortisone injection indicated today   3. Yes Physical/Occupational Therapy indicated today: lumbar spine  4. No diagnostic test indicated today:   5. No durable medical equipment indicated today  6. YES referral indicated today spine center  7. NO medications indicated today:   8.  No Narcotic indicated today      RTC PRN        [facility]

## 2019-04-18 ENCOUNTER — HOSPITAL ENCOUNTER (OUTPATIENT)
Dept: PHYSICAL THERAPY | Age: 64
Discharge: HOME | End: 2019-04-18
Payer: MEDICARE

## 2019-04-18 PROCEDURE — 97140 MANUAL THERAPY 1/> REGIONS: CPT

## 2019-04-18 PROCEDURE — 97110 THERAPEUTIC EXERCISES: CPT

## 2019-04-18 PROCEDURE — 97112 NEUROMUSCULAR REEDUCATION: CPT

## 2019-04-18 NOTE — PROGRESS NOTES
In [address]  [place] [address] [place], [address] 
[phone] fax Medicare Progress Report Patient name: Mahendra Parson Start of Care: 3/18/2019 [facility] 1955 Medical Diagnosis: Radiculopathy, lumbar region [M54.16] Payor: [place] MEDICARE / Plan: [address] [place] [address] / Product Type: Medicare /  Onset Date:              
Treatment Diagnosis: Left leg pain Prior Hospitalization: see medical history Provider#: [id number] Medications: Verified on Patient summary List  
 Comorbidities: depression, arthritis, visual impairments, cancer, high blood pressure Prior Level of Function: (I) with all ADLs, and able to walk pain free Visits from Start of Care: 9    Missed Visits: 0 Reporting Period: 3/18/19 to 19 Subjective Reports: \"Yesterday I had a really good day\". Key functional changes: Patient demonstrates improved functional mobility, improved right hip mobility especially ER, and improved strength. Patient continues to have inconsistent pain levels, however states she is having more good days than bad days since start of care. Short Term Goals: To be accomplished in 4 weeks: 
            1. Patient will be independent and compliant with HEP in order to improve functional mobility. Not met- pt reports being busy 3/21/19  
            2. Patient will be able to decrease pain to no more than 5/10 in order to improve ease of ADLs. 3/25/19 - patient reports 1-2/10 pain today. Progressing able to perform full work day yesterday with <5/10 pain today 19 Long Term Goals: To be accomplished in 8 weeks: 
            1. Patient will be able to improve FOTO score to 66 in order to demonstrate improvements in functional independence.  Progressing 19 - FOTO score 52 
            2. Patient will be able to improve right hip ER to 30 degrees in order to improve ease of ADLs. Progressing challenged with hip hip ER flexibility in various positions 4/3/19 met 32 deg 4/9/19 
            3. Patient will be able to climb a flight of stairs without difficulty in order to return to prior level of function. 4/18/19 - patient reports being limited a little on FOTO survey Problems/ barriers to goal attainment: Compliance with HEP Assessment / Recommendations:Patient reports no pain post session. Good tolerance to reformer exercises, patient reporting \"these exercises feel really good\". Patient able to perform glute activation exercises void of pain today. Plan to continue progressing therapy exercises and decreasing myofascial restrictions in patients right glutes. Problem List: pain affecting function, decrease ROM, decrease strength, impaired gait/ balance, decrease ADL/ functional abilitiies, decrease activity tolerance and decrease flexibility/ joint mobility Treatment Plan: Therapeutic exercise, Neuromuscular re-education, Physical agent/modality, Gait/balance training, Manual therapy, Patient education and Functional mobility training Patient Goal (s) has been updated and includes: Improve functional independence, improve ease of stair negotiation, improve strength Updated Goals to be accomplished in 3 weeks: 1. Patient will be able to improve FOTO score to 66 in order to demonstrate improvements in functional independence. Progressing 4/18/19 - FOTO score 52 2. Patient will be able to improve right hip strength to 4+/5 in order to improve ease of functional mobility. 3. Patient will be able to climb a flight of stairs without difficulty in order to return to prior level of function. 4/18/19 - patient reports being limited a little on FOTO survey Frequency / Duration: Patient to be seen 2 times per week for 3 weeks: 
 
 
Suzette Malhotra, PT 4/18/2019 9:00 AM

## 2019-04-18 NOTE — PROGRESS NOTES
PT DAILY TREATMENT NOTE 10-18 Patient Name: Myrna Dahl Date:2019 : 1955 [x]  Patient  Verified Payor: [place] MEDICARE / Plan: Barbara Hooker / Product Type: Medicare / In time:8:52  Out time:9:33 Total Treatment Time (min): 41 Visit #: 9 of 16 Medicare/BCBS Only Total Timed Codes (min):  41 1:1 Treatment Time:  45 Treatment Area: Low back pain [M54.5] SUBJECTIVE Pain Level (0-10 scale): 4 Any medication changes, allergies to medications, adverse drug reactions, diagnosis change, or new procedure performed?: [x] No    [] Yes (see summary sheet for update) Subjective functional status/changes:   [] No changes reported \"Yesterday I had a really good day\". OBJECTIVE 25 min Therapeutic Exercise:  [x] See flow sheet :  
Rationale: increase ROM and increase strength to improve the patients ability to perform ADLs with improved ease. 8 min Neuromuscular Re-education:  [x]  See flow sheet :  
Rationale: increase strength, improve coordination and increase proprioception  to improve the patients ability to perform functional activities with improved ease. 8 min Manual Therapy:  STM/DTM right glutes, roller stick to right glutes and IT band Rationale: decrease pain, increase ROM and increase tissue extensibility to improve patients ease of ADLs. With 
 [] TE 
 [] TA 
 [] neuro 
 [] other: Patient Education: [x] Review HEP [] Progressed/Changed HEP based on:  
[] positioning   [] body mechanics   [] transfers   [] heat/ice application   
[] other:   
 
Other Objective/Functional Measures: FOTO score - 52 Pain Level (0-10 scale) post treatment: 0 
 
ASSESSMENT/Changes in Function: Patient reports no pain post session. Good tolerance to reformer exercises, patient reporting \"these exercises feel really good\".  Patient able to perform glute activation exercises void of pain today. Plan to continue progressing therapy exercises and decreasing myofascial restrictions in patients right glutes. Patient will continue to benefit from skilled PT services to modify and progress therapeutic interventions, address functional mobility deficits, address ROM deficits, address strength deficits, analyze and address soft tissue restrictions, analyze and cue movement patterns, analyze and modify body mechanics/ergonomics and assess and modify postural abnormalities to attain remaining goals. [x]  See Plan of Care 
[]  See progress note/recertification 
[]  See Discharge Summary Progress towards goals / Updated goals: 
Short Term Goals: To be accomplished in 4 weeks: 
            1. Patient will be independent and compliant with HEP in order to improve functional mobility. Not met- pt reports being busy 3/21/19  
            2. Patient will be able to decrease pain to no more than 5/10 in order to improve ease of ADLs. 3/25/19 - patient reports 1-2/10 pain today. Progressing able to perform full work day yesterday with <5/10 pain today 4/9/19 Long Term Goals: To be accomplished in 8 weeks: 
            1. Patient will be able to improve FOTO score to 66 in order to demonstrate improvements in functional independence. Progressing 4/18/19 - FOTO score 52 
            2. Patient will be able to improve right hip ER to 30 degrees in order to improve ease of ADLs. Progressing challenged with hip hip ER flexibility in various positions 4/3/19 met 32 deg 4/9/19 
            3. Patient will be able to climb a flight of stairs without difficulty in order to return to prior level of function. 4/18/19 - patient reports being limited a little on FOTO survey PLAN 
[]  Upgrade activities as tolerated     [x]  Continue plan of care 
[]  Update interventions per flow sheet      
[]  Discharge due to:_ 
[]  Other:_ John Paul Causey, PT 4/18/2019  8:58 AM 
 
Future Appointments Date Time Provider Federico Yvonne 4/18/2019  9:00 AM Nicholas Jointer, PT [facility]  
4/22/2019  8:30 AM Nicholas Jointer, PT [facility]  
4/25/2019  8:30 AM Brooklynn Cai [facility]  
4/29/2019  8:30 AM Nicholas Jointer, PT [facility]  
5/2/2019  8:30 AM Nicholas Jointer A, PT [facility]  
5/28/2019  7:30 AM HBV ESHA LUZ [facility]

## 2019-04-22 ENCOUNTER — HOSPITAL ENCOUNTER (OUTPATIENT)
Dept: PHYSICAL THERAPY | Age: 64
Discharge: HOME | End: 2019-04-22
Payer: MEDICARE

## 2019-04-22 PROCEDURE — 97110 THERAPEUTIC EXERCISES: CPT

## 2019-04-22 PROCEDURE — 97140 MANUAL THERAPY 1/> REGIONS: CPT

## 2019-04-22 NOTE — PROGRESS NOTES
PT DAILY TREATMENT NOTE 10-18 Patient Name: July Petersen Date:2019 : 1955 [x]  Patient  Verified Payor: [place] MEDICARE / Plan: Barbara Hooker / Product Type: Medicare / In time:8:30  Out time:9:08 Total Treatment Time (min): 38 Visit #: 1 of 6 Medicare/BCBS Only Total Timed Codes (min):  38 1:1 Treatment Time:  30 Treatment Area: Low back pain [M54.5] SUBJECTIVE Pain Level (0-10 scale): 9 Any medication changes, allergies to medications, adverse drug reactions, diagnosis change, or new procedure performed?: [x] No    [] Yes (see summary sheet for update) Subjective functional status/changes:   [] No changes reported \"I am hurting today, I am not taking my tylenol either\". OBJECTIVE 30 min Therapeutic Exercise:  [x] See flow sheet :  
Rationale: increase ROM and increase strength to improve the patients ability to perform ADLs with improved ease. 8 min Manual Therapy:  STM/DTM right glutes, roller stick to right glutes and IT band and right quad Rationale: decrease pain, increase ROM and increase tissue extensibility to improve patients ease of performing ADLs. With 
 [] TE 
 [] TA 
 [] neuro 
 [] other: Patient Education: [x] Review HEP [] Progressed/Changed HEP based on:  
[] positioning   [] body mechanics   [] transfers   [] heat/ice application   
[] other:   
 
Other Objective/Functional Measures: Increased reps with reformer leg press exercise Pain Level (0-10 scale) post treatment: 8 
 
ASSESSMENT/Changes in Function: Minimal decrease in pain post session. Patient with elevated pain levels today. Patient educated on continued participation in [employer]. Patient states she is scheduled to see a spine specialist regarding her pain.  During therapy exercises today patient reports no pain when asked by therapist.  
 
Patient will continue to benefit from skilled PT services to modify and progress therapeutic interventions, address functional mobility deficits, address ROM deficits, address strength deficits, analyze and address soft tissue restrictions, analyze and cue movement patterns, analyze and modify body mechanics/ergonomics and assess and modify postural abnormalities to attain remaining goals. [x]  See Plan of Care 
[]  See progress note/recertification 
[]  See Discharge Summary Progress towards goals / Updated goals: 
Updated Goals to be accomplished in 3 weeks: 1. Patient will be able to improve FOTO score to 66 in order to demonstrate improvements in functional independence. Progressing 4/18/19 - FOTO score 52 2. Patient will be able to improve right hip strength to 4+/5 in order to improve ease of functional mobility. 3. Patient will be able to climb a flight of stairs without difficulty in order to return to prior level of function. 4/18/19 - patient reports being limited a little on [place] 
[]  Upgrade activities as tolerated     [x]  Continue plan of care 
[]  Update interventions per flow sheet      
[]  Discharge due to:_ 
[]  Other:_ Eufemia Scruggs, PT 4/22/2019  8:35 AM 
 
Future Appointments Date Time Provider Federico Russell 4/25/2019  8:30 AM Estrella Young [facility]  
4/29/2019  8:30 AM Jace Jerome, UGO [facility]  
5/2/2019  8:30 AM Jace MULLINS PT [facility]  
5/28/2019  7:30 AM [facility] E[facility] TOMAS [facility]

## 2019-04-25 ENCOUNTER — HOSPITAL ENCOUNTER (OUTPATIENT)
Dept: PHYSICAL THERAPY | Age: 64
Discharge: HOME | End: 2019-04-25
Payer: MEDICARE

## 2019-04-25 PROCEDURE — 97112 NEUROMUSCULAR REEDUCATION: CPT

## 2019-04-25 PROCEDURE — 97110 THERAPEUTIC EXERCISES: CPT

## 2019-04-25 PROCEDURE — 97140 MANUAL THERAPY 1/> REGIONS: CPT

## 2019-04-25 NOTE — PROGRESS NOTES
PT DAILY TREATMENT NOTE 10-18 Patient Name: Rubi Yeh Date:2019 : 1955 [x]  Patient  Verified Payor: [place] MEDICARE / Plan: [facility] / Product Type: Medicare / In time:8:30  Out time:9:17 Total Treatment Time (min): 47 Visit #: 2 of 6 Medicare/BCBS Only Total Timed Codes (min):  47 1:1 Treatment Time:  41  
 
 
Treatment Area: Low back pain [M54.5] SUBJECTIVE Pain Level (0-10 scale): 3-4 Any medication changes, allergies to medications, adverse drug reactions, diagnosis change, or new procedure performed?: [x] No    [] Yes (see summary sheet for update) Subjective functional status/changes:   [] No changes reported \"I don't know that it is but I feel really good today\" OBJECTIVE 29 min Therapeutic Exercise:  [] See flow sheet : including pt discussion Rationale: increase ROM and increase strength to improve the patients ability to perform daily tasks and self care 10 min Neuromuscular Re-education:  []  See flow sheet :  
Rationale: improve coordination and increase proprioception  to improve the patients ability to perform ADLs with improved core activation and stability 8 min Manual Therapy:  Stick IASTM to right glutes/quads Rationale: decrease pain and increase tissue extensibility to improve ease of ADLs With 
 [] TE 
 [] TA 
 [] neuro 
 [] other: Patient Education: [x] Review HEP [] Progressed/Changed HEP based on:  
[] positioning   [] body mechanics   [] transfers   [] heat/ice application   
[] other:   
 
Other Objective/Functional Measures: Pt reports that she does not like only getting 30 minutes of treatment time despite the fact that her flowsheet shows multiple visit times of 40-50 minute sessions, until recently in which her pain has been elevated. Pt is a bit confrontational but in calm demeanor, reports she doesn't want to \"get written up\" so requests to talk about it later.  Discussed with patient that she has received 40+ minute sessions, nearly all 1:1 pt still insists she has not received enough time solely with her therapist. Pt left clinic with no pain and no signs of distress, pleasant with therapist  
 
Pain Level (0-10 scale) post treatment: 0 
 
ASSESSMENT/Changes in Function: Pt demonstrates improved pain levels today and good tolerance to increased strength interventions. She does have some discomfort with hip IR in s/l B (right > left). Will progress with core strength and mechanics as able Patient will continue to benefit from skilled PT services to modify and progress therapeutic interventions, address functional mobility deficits, address ROM deficits, address strength deficits, analyze and address soft tissue restrictions, analyze and cue movement patterns and analyze and modify body mechanics/ergonomics to attain remaining goals. []  See Plan of Care 
[]  See progress note/recertification 
[]  See Discharge Summary Updated Goals to be accomplished in 3 weeks: 1. Patient will be able to improve FOTO score to 66 in order to demonstrate improvements in functional independence. Progressing 4/18/19 - FOTO score 52 2. Patient will be able to improve right hip strength to 4+/5 in order to improve ease of functional mobility.  
3. Patient will be able to climb a flight of stairs without difficulty in order to return to prior level of function. 4/18/19 - patient reports being limited a little on FOTO survey    
 
 
PLAN 
[]  Upgrade activities as tolerated     []  Continue plan of care 
[]  Update interventions per flow sheet      
[]  Discharge due to:_ 
[]  Other:_ Laurel Duenas DPT, CMTPT 4/25/2019  8:47 AM 
 
Future Appointments Date Time Provider Federico Russell 5/1/2019 12:00 PM [facility]  
5/2/2019  8:30 AM Nikhil Rosen, PT [facility]  
5/22/2019  1:30 PM Cody Abreu  [address]  
5/28/2019  7:30 AM [facility] E[facility] TOMAS [facility]

## 2019-04-29 ENCOUNTER — APPOINTMENT (OUTPATIENT)
Dept: PHYSICAL THERAPY | Age: 64
End: 2019-04-29
Payer: MEDICARE

## 2019-05-01 ENCOUNTER — HOSPITAL ENCOUNTER (OUTPATIENT)
Dept: PHYSICAL THERAPY | Age: 64
Discharge: HOME | End: 2019-05-01
Payer: MEDICARE

## 2019-05-01 PROCEDURE — 97140 MANUAL THERAPY 1/> REGIONS: CPT

## 2019-05-01 PROCEDURE — 97112 NEUROMUSCULAR REEDUCATION: CPT

## 2019-05-01 PROCEDURE — 97110 THERAPEUTIC EXERCISES: CPT

## 2019-05-01 NOTE — PROGRESS NOTES
PT DAILY TREATMENT NOTE 10-18 Patient Name: Xavi Mccullough Date:2019 : 1955 [x]  Patient  Verified Payor: [place] / Plan: Barbara [facility]er / Product Type: Medicare / In time:11:49  Out time:12:33 Total Treatment Time (min): 44 Visit #: 3 of 6 Medicare/BCBS Only Total Timed Codes (min):  44 1:1 Treatment Time:  44 Treatment Area: Low back pain [M54.5] SUBJECTIVE Pain Level (0-10 scale): 9 Any medication changes, allergies to medications, adverse drug reactions, diagnosis change, or new procedure performed?: [x] No    [] Yes (see summary sheet for update) Subjective functional status/changes:   [] No changes reported \"Not good today. I had to work  and Monday and we stood for 9 hours\" OBJECTIVE 26 min Therapeutic Exercise:  [x] See flow sheet : including pt discussion/assessment Rationale: increase ROM and increase strength to improve the patients ability to perform daily tasks 10 min Neuromuscular Re-education:  []  See flow sheet :  
Rationale: improve coordination and increase proprioception  to improve the patients ability to perform ADLs with improved hip and core stability 8 min Manual Therapy:  Stick IASTM/TPR right glutes, amanda stretch with stick IASTM to right quads Rationale: decrease pain and increase tissue extensibility to improve ease of ADL performance With 
 [] TE 
 [] TA 
 [] neuro 
 [] other: Patient Education: [x] Review HEP [] Progressed/Changed HEP based on:  
[] positioning   [] body mechanics   [] transfers   [] heat/ice application   
[] other:   
 
Other Objective/Functional Measures: pt quite TTP through right anterior thigh/quads, good muscle activation and strength noted though Pt requests to only perform right leg DK/hip extension today due to pain with standing on right LE; Pt states that she is ok \"getting out a little early today\" due to pain increase Pain Level (0-10 scale) post treatment: 7-8 ASSESSMENT/Changes in Function: Pt reporting elevated pain levels today secondary to increased standing at work earlier this week. She seems to demonstrate more hip source of symptoms at this time based on positioning during therex and manual assessment. Held some therex today due to increased pain levels, pt reported she was ok with this. Will continue PT focusing on hip/lumbopelvic mechanics towards spine specialist consult later this month. Patient will continue to benefit from skilled PT services to modify and progress therapeutic interventions, address functional mobility deficits, address ROM deficits, address strength deficits, analyze and address soft tissue restrictions, analyze and cue movement patterns and analyze and modify body mechanics/ergonomics to attain remaining goals. []  See Plan of Care 
[]  See progress note/recertification 
[]  See Discharge Summary Updated Goals to be accomplished in 3 weeks: 1. Patient will be able to improve FOTO score to 66 in order to demonstrate improvements in functional independence. Progressing 4/18/19 - FOTO score 52 2. Patient will be able to improve right hip strength to 4+/5 in order to improve ease of functional mobility. Not met 4-/5 hip abduction secondary to discomfort 5/1/19 3. Patient will be able to climb a flight of stairs without difficulty in order to return to prior level of function. 4/18/19 - patient reports being limited a little on FOTO survey    
 
PLAN 
[]  Upgrade activities as tolerated     []  Continue plan of care 
[]  Update interventions per flow sheet      
[]  Discharge due to:_ 
[]  Other:_ Ena Rivas DPT, CMTPT 5/1/2019  12:00 PM 
 
Future Appointments Date Time Provider Federico Russell 5/2/2019  8:30 AM Flower Gilmore, PT [facility]  
5/22/2019  1:30 PM Rachel Garsia  [address]  
5/28/2019  7:30 AM [facility] ESHA ESCOBAR  [facility]

## 2019-05-02 ENCOUNTER — HOSPITAL ENCOUNTER (OUTPATIENT)
Dept: PHYSICAL THERAPY | Age: 64
Discharge: HOME | End: 2019-05-02
Payer: MEDICARE

## 2019-05-02 PROCEDURE — 97110 THERAPEUTIC EXERCISES: CPT

## 2019-05-02 PROCEDURE — 97140 MANUAL THERAPY 1/> REGIONS: CPT

## 2019-05-02 NOTE — PROGRESS NOTES
PT DAILY TREATMENT NOTE 10-18 Patient Name: Karen Servin Date:2019 : 1955 [x]  Patient  Verified Payor: [place] MED[place]ARE / Plan: [facility] [place] / Product Type: Medicare / In time:8:30  Out time:9:08 Total Treatment Time (min): 38 Visit #: 4 of 6 Medicare/BCBS Only Total Timed Codes (min):  38 1:1 Treatment Time:  35 Treatment Area: Low back pain [M54.5] SUBJECTIVE Pain Level (0-10 scale): 8 Any medication changes, allergies to medications, adverse drug reactions, diagnosis change, or new procedure performed?: [x] No    [] Yes (see summary sheet for update) Subjective functional status/changes:   [] No changes reported \"I am still hurting, I think I did too much yard work yesterday\". OBJECTIVE 30 min Therapeutic Exercise:  [x] See flow sheet :  
Rationale: increase ROM and increase strength to improve the patients ability to perform ADLs with improved ease. 8 min Manual Therapy:  Russ stretch with over pressure, Stick IASTM/TPR right glutes and right quads Rationale: increase ROM and increase tissue extensibility to improve patients ease of ADLs. With 
 [] TE 
 [] TA 
 [] neuro 
 [] other: Patient Education: [x] Review HEP [] Progressed/Changed HEP based on:  
[] positioning   [] body mechanics   [] transfers   [] heat/ice application   
[] other:   
 
Other Objective/Functional Measures:   
 
Pain Level (0-10 scale) post treatment: 7 ASSESSMENT/Changes in Function: Decreased activity tolerance due to increased pain levels. Patient reports minimal pain relief post session. Patient educated at length on activity moderation due to frequent bouts of increased pain levels after prolonged activity. Patient educated on importance of HEP compliance daily in order to maximize therapeutic benefits.    
 
Patient will continue to benefit from skilled PT services to modify and progress therapeutic interventions, address functional mobility deficits, address ROM deficits, address strength deficits, analyze and address soft tissue restrictions, analyze and cue movement patterns, analyze and modify body mechanics/ergonomics and assess and modify postural abnormalities to attain remaining goals. [x]  See Plan of Care 
[]  See progress note/recertification 
[]  See Discharge Summary Progress towards goals / Updated goals: 1. Patient will be able to improve FOTO score to 66 in order to demonstrate improvements in functional independence. Progressing 4/18/19 - FOTO score 52 2. Patient will be able to improve right hip strength to 4+/5 in order to improve ease of functional mobility. Not met 4-/5 hip abduction secondary to discomfort 5/1/19 3. Patient will be able to climb a flight of stairs without difficulty in order to return to prior level of function. 4/18/19 - patient reports being limited a little on FOTO survey    
 
PLAN 
[]  Upgrade activities as tolerated     [x]  Continue plan of care 
[]  Update interventions per flow sheet      
[]  Discharge due to:_ 
[]  Other:_ Lazaro Pastor, PT 5/2/2019  8:33 AM 
 
Future Appointments Date Time Provider Federico Russell 5/8/2019  8:00 AM Kalie Benitez, PT [facility]  
5/9/2019  8:00 AM Kriss Holm, PT [facility]  
5/14/2019 10:00 AM Richrd Marker, PT [facility]  
5/16/2019  8:00 AM Richrd Marker, PT [facility]  
5/20/2019  8:00 AM Richrd Marker, PT [facility]  
5/22/2019  1:30 PM Morris Yoo  [address]   
5/23/2019  8:30 AM Richrd Marker A, PT [facility]  
5/28/2019  7:30 AM [facility] ESHA MARTINEZO [facility]  
5/28/2019  9:30 AM Richrd Marker, PT [facility]  
5/30/2019  8:30 AM Richrd Marker, PT [facility]

## 2019-05-08 ENCOUNTER — HOSPITAL ENCOUNTER (OUTPATIENT)
Dept: PHYSICAL THERAPY | Age: 64
Discharge: HOME | End: 2019-05-08
Payer: MEDICARE

## 2019-05-08 PROCEDURE — 97140 MANUAL THERAPY 1/> REGIONS: CPT

## 2019-05-08 PROCEDURE — 97110 THERAPEUTIC EXERCISES: CPT

## 2019-05-08 NOTE — PROGRESS NOTES
PT DAILY TREATMENT NOTE 10-18 Patient Name: Telma Lorenzo Date:2019 : 1955 [x]  Patient  Verified Payor: [place] MEDICARE / Plan: [facility] / Product Type: Medicare / In time:8:00  Out time:8:35 Total Treatment Time (min): 35 Visit #: 5 of 6 Medicare/BCBS Only Total Timed Codes (min):  35 1:1 Treatment Time:  35 Treatment Area: Low back pain [M54.5] SUBJECTIVE Pain Level (0-10 scale): 7 Any medication changes, allergies to medications, adverse drug reactions, diagnosis change, or new procedure performed?: [x] No    [] Yes (see summary sheet for update) Subjective functional status/changes:   [] No changes reported \"I was hurting this morning when I got up, like a 9. But once I got moving around doing my stuff it was a little better\" OBJECTIVE Modality rationale: PD to improve the patients ability to Min Type Additional Details  
 [] Estim:  []Unatt       []IFC  []Premod []Other:  []w/ice   []w/heat Position: Location:  
 [] Estim: []Att    []TENS instruct  []NMES []Other:  []w/US   []w/ice   []w/heat Position: Location:  
 []  Traction: [] Cervical       []Lumbar 
                     [] Prone          []Supine []Intermittent   []Continuous Lbs: 
[] before manual 
[] after manual  
 []  Ultrasound: []Continuous   [] Pulsed []1MHz   []3MHz W/cm2: 
Location:  
 []  Iontophoresis with dexamethasone Location: [] Take home patch  
[] In clinic  
 []  Ice     []  heat 
[]  Ice massage 
[]  Laser  
[]  Anodyne Position: Location:  
 []  Laser with stim 
[]  Other:  Position: Location:  
 []  Vasopneumatic Device Pressure:       [] lo [] med [] hi  
Temperature: [] lo [] med [] hi  
[] Skin assessment post-treatment:  []intact []redness- no adverse reaction 
  []redness  adverse reaction:  
 
17 min Therapeutic Exercise:  [] See flow sheet :  
 Rationale: increase ROM and increase strength to improve the patients ability to perform daily tasks and self care 10 min Neuromuscular Re-education:  []  See flow sheet :  
Rationale: increase strength and improve coordination  to improve the patients ability to perform functional tasks with improved core stability 8 min Manual Therapy:  Stick IASTM to right glutes and vastus lateralis Rationale: decrease pain, increase tissue extensibility and decrease trigger points to perform ADLs With 
 [] TE 
 [] TA 
 [] neuro 
 [] other: Patient Education: [x] Review HEP [] Progressed/Changed HEP based on:  
[] positioning   [] body mechanics   [] transfers   [] heat/ice application   
[] other:   
 
Other Objective/Functional Measures:   
 
Pain Level (0-10 scale) post treatment: 7 ASSESSMENT/Changes in Function: Pt with reports of decreased pain following sessions normally, less relief noted as of late. She demonstrates continued high levels of pain between sessions. Discussed with patient today that therapist recommends holding further PT and allow spine center consult. Pt reports she is agreeable to this and will plan to D/C tomorrow to HEP. Patient will continue to benefit from skilled PT services to modify and progress therapeutic interventions, address functional mobility deficits, address ROM deficits, address strength deficits, analyze and address soft tissue restrictions, analyze and cue movement patterns and analyze and modify body mechanics/ergonomics to attain remaining goals. []  See Plan of Care 
[]  See progress note/recertification 
[]  See Discharge Summary Progress towards goals / Updated goals: 1. Patient will be able to improve FOTO score to 66 in order to demonstrate improvements in functional independence. Progressing 4/18/19 - FOTO score 52 2.  Patient will be able to improve right hip strength to 4+/5 in order to improve ease of functional mobility. Not met 4-/5 hip abduction secondary to discomfort 5/1/19 3. Patient will be able to climb a flight of stairs without difficulty in order to return to prior level of function. 4/18/19 - patient reports being limited a little on FOTO survey; difficulty noted in clinic due to leg pain reports 5/8/19    
 
PLAN 
[]  Upgrade activities as tolerated     [x]  Continue plan of care 
[]  Update interventions per flow sheet      
[]  Discharge due to:_ 
[]  Other:_ Tomasa Gupta DPT, CMTPT 5/8/2019  8:00 AM 
 
Future Appointments Date Time Provider Federico Russell 5/9/2019  8:00 AM Mamta Carrasco, PT [facility]  
5/14/2019 10:00 AM Sheral Chesnee, PT [facility]  
5/16/2019  8:00 AM Sheral Chesnee, PT [facility]  
5/20/2019  8:00 AM Sheral Chesnee, PT [facility]  
5/22/2019  1:30 PM José Galeano  [address]   
5/23/2019  8:30 AM Sheral Chesnee A, PT [facility]  
5/28/2019  7:30 AM HBV ESHA ESCOBAR [facility]  
5/28/2019  9:30 AM Sheral Chesnee, PT [facility]  
5/30/2019  8:30 AM Sheral Chesnee, PT [facility]

## 2019-05-09 ENCOUNTER — HOSPITAL ENCOUNTER (OUTPATIENT)
Dept: PHYSICAL THERAPY | Age: 64
Discharge: HOME | End: 2019-05-09
Payer: MEDICARE

## 2019-05-09 PROCEDURE — 97110 THERAPEUTIC EXERCISES: CPT

## 2019-05-09 PROCEDURE — 97140 MANUAL THERAPY 1/> REGIONS: CPT

## 2019-05-09 PROCEDURE — 97112 NEUROMUSCULAR REEDUCATION: CPT

## 2019-05-09 NOTE — PROGRESS NOTES
PT DISCHARGE DAILY NOTE AND [id number] Date:2019 [facility] Referral Carmina Kocher, PA SYR:               
Medical Diagnosis: Radiculopathy, lumbar region [M54.16] Payor: [place] MEDICARE / Plan: [place] MEDICARE PART A & B / Product Type: Medicare /  Onset Date:              
Treatment Diagnosis: Left leg pain Prior Hospitalization: see medical history Provider#: [id number] Medications: Verified on Patient summary List  
 Comorbidities: depression, arthritis, visual impairments, cancer, high blood pressure 
 Prior Level of Function: (I) with all ADLs, and able to walk pain free Visits from Start of Care: 15    Missed Visits: 0 Reporting Period : 2019 to 2019 [x]  Patient  Verified Payor: [place] MEDICARE / Plan: [address] / Product Type: Medicare / In time:8:28  Out time:9:10 Total Treatment Time (min): 42 Total Timed Codes (min): 42 
1:1 Treatment Time ([facility] only): 42 Visit #: 6 of 6 SUBJECTIVE Pain Level (0-10 scale): 8 Any medication changes, allergies to medications, adverse drug reactions, diagnosis change, or new procedure performed?: [x] No    [] Yes (see summary sheet for update) Subjective functional status/changes:   [] No changes reported \"Bad today\" OBJECTIVE 10 min Therapeutic Exercise:  [] See flow sheet :  
Rationale: increase ROM and increase strength to improve the patients ability to improve ease of ADLs and load tolerance of hip 
  
22 min Neuromuscular Re-education:  []  See flow sheet :  
Rationale: improve coordination and increase proprioception  to improve the patients ability to perform daily tasks with improved core activation and strength 10 min Manual Therapy:  Stick IASTM right lateral quads and glutes, assessment of right hip and SI region Rationale: decrease pain and increase tissue extensibility to improve ease of ADLs and assess possible source of increased symptomatology With 
 [] TE 
 [] TA 
 [] neuro 
 [] other: Patient Education: [x] Review HEP [] Progressed/Changed HEP based on:  
[] positioning   [] body mechanics   [] transfers   [] heat/ice application   
[] other:   
 
Other Objective/Functional Measures: FOTO 65 Pain Level (0-10 scale) post treatment: 7 Summary of Care: 
Progress towards goals / Updated goals: 1. Patient will be able to improve FOTO score to 66 in order to demonstrate improvements in functional independence. Progressing 4/18/19 - FOTO score 52; near met 65% (initial 48%) 5/9/19 2. Patient will be able to improve right hip strength to 4+/5 in order to improve ease of functional mobility. Not met 4-/5 hip abduction secondary to discomfort 5/1/19 3. Patient will be able to climb a flight of stairs without difficulty in order to return to prior level of function. 4/18/19 - patient reports being limited a little on FOTO survey; difficulty noted in clinic due to leg pain reports 5/8/19    
 
ASSESSMENT/Changes in Function: Pt with moderate progress from PT thus far. She does report improved pain levels following sessions but continues to report limited carryover. She has been reporting elevated pain levels over the last 2 weeks. Will D/C at this time for spine specialist consult, administered updated HEP to continue with core strength. Thank you for this referral! 
  
PLAN [x]Discontinue therapy: []Patient has reached or is progressing toward set goals []Patient is non-compliant or has abdicated 
    [x]Due to lack of appreciable progress towards set goals Tato PaymentRUSS, IRIS 5/9/2019  8:45 AM

## 2019-05-14 ENCOUNTER — APPOINTMENT (OUTPATIENT)
Dept: PHYSICAL THERAPY | Age: 64
End: 2019-05-14
Payer: MEDICARE

## 2019-05-16 ENCOUNTER — APPOINTMENT (OUTPATIENT)
Dept: PHYSICAL THERAPY | Age: 64
End: 2019-05-16
Payer: MEDICARE

## 2019-05-20 ENCOUNTER — APPOINTMENT (OUTPATIENT)
Dept: PHYSICAL THERAPY | Age: 64
End: 2019-05-20
Payer: MEDICARE

## 2019-05-22 ENCOUNTER — OFFICE VISIT (OUTPATIENT)
Dept: ORTHOPEDIC SURGERY | Age: 64
End: 2019-05-22

## 2019-05-22 VITALS
DIASTOLIC BLOOD PRESSURE: 92 MMHG | OXYGEN SATURATION: 100 % | SYSTOLIC BLOOD PRESSURE: 137 MMHG | BODY MASS INDEX: 34.98 KG/M2 | WEIGHT: 197.4 LBS | TEMPERATURE: 98.2 F | HEART RATE: 62 BPM | HEIGHT: 63 IN

## 2019-05-22 DIAGNOSIS — M54.50 NONSPECIFIC PAIN IN THE LUMBAR REGION: ICD-10-CM

## 2019-05-22 DIAGNOSIS — M47.816 LUMBAR SPONDYLOSIS: ICD-10-CM

## 2019-05-22 DIAGNOSIS — M54.16 LUMBAR RADICULITIS: Primary | ICD-10-CM

## 2019-05-22 RX ORDER — HYDROCODONE BITARTRATE AND ACETAMINOPHEN 5; 325 MG/1; MG/1
1 TABLET ORAL
Qty: 21 TAB | Refills: 0 | Status: SHIPPED | OUTPATIENT
Start: 2019-05-22 | End: 2019-05-22

## 2019-05-22 RX ORDER — GABAPENTIN 400 MG/1
400 CAPSULE ORAL 4 TIMES DAILY
Qty: 120 CAP | Refills: 2 | Status: SHIPPED | OUTPATIENT
Start: 2019-05-22 | End: 2019-05-22

## 2019-05-22 RX ORDER — GABAPENTIN 300 MG/1
CAPSULE ORAL
Qty: 60 CAP | Refills: 1 | Status: SHIPPED | OUTPATIENT
Start: 2019-05-22

## 2019-05-22 RX ORDER — DIAZEPAM 10 MG/1
TABLET ORAL
Qty: 1 TAB | Refills: 0 | Status: SHIPPED | OUTPATIENT
Start: 2019-05-22 | End: 2022-08-17

## 2019-05-22 RX ORDER — DICLOFENAC SODIUM 10 MG/G
GEL TOPICAL
Qty: 500 G | Refills: 5 | Status: SHIPPED | OUTPATIENT
Start: 2019-05-22

## 2019-05-22 NOTE — PROGRESS NOTES
[address]  [address], [address]  [place]  Phone: [phone]  Fax: [phone]        Ernesto Gandhi  : 1955  PCP: Hillary Alex MD      NEW PATIENT      ASSESSMENT AND PLAN     Diagnoses and all orders for this visit:    1. Lumbar radiculitis  -     MRI LUMB SPINE WO CONT; Future  -     diazePAM (VALIUM) 10 mg tablet; TAKE 1 TAB PO 30 MINS PRIOR TO MRI (MUST HAVE SOMEONE TO DRIVE TO AND FROM THE FACILITY)    2. Lumbar spondylosis  -     MRI LUMB SPINE WO CONT; Future  -     diazePAM (VALIUM) 10 mg tablet; TAKE 1 TAB PO 30 MINS PRIOR TO MRI (MUST HAVE SOMEONE TO DRIVE TO AND FROM THE FACILITY)    3. Nonspecific pain in the lumbar region    Other orders  -     gabapentin (NEURONTIN) 300 mg capsule; Take 1 tab PO QHS x1 week then increase to BID thereafter  -     diclofenac (VOLTAREN) 1 % gel; QID to affected areas PRN for pain       1. Advised to stay active as tolerated. 2. MRI lumbar spine - Low back pain. Radiating pain into RLE x3 months. Failed PT, prednisone, NSAIDS. 3. Trial of Gabapentin  4. DC Baclofen, Advil, Aleve  5. Continue Voltaren gel  6. May take Tylenol PM  7. Given information on MRI lumbar spine and sciatica    F/U after MRI      CHIEF Talon 58 is seen today in consultation at the request of SRINIVASAN Yap for complaints of RLE pain. HISTORY OF PRESENT ILLNESS  Karon George is a 70 y.o. female. Today pt c/o RLE pain of 3 month duration. Pt denies any specific incident or injury that caused their pain. Pt states she does not have low back pain, rather RLE pain buttock down. She notes her R calf feels different than the L, as if she has a cast on. She states her pain is worse with standing and walking. Pt reports she has a 10-20 minute standing tolerance. + shopping cart. Pt states her leg does not give out because she paces herself. She states her pain started when she was dancing.  Pt c/o not sleeping well, maybe 2 hours at a time. She notes she has been told her potassium is low. Location of pain: R buttock to calf  Does pain radiate into extremities: RLE L4 with numbness  Does patient have weakness: no   Pt denies saddle paresthesias. Medications pt is on: Baclofen [id number] PRN, uncertain benefit. Tylenol Arthritis PRN. Aleve PRN, no benefit. Advil PRN, no benefit. Patches PRN. Voltaren gel PRN. MDP with temporary benefit. She has not taken medication this week because they were not useful and she felt she took too much. Pt denies recent ED visits or hospitalizations. Denies persistent fevers, chills, weight changes, neurogenic bowel or bladder symptoms. Treatments patient has tried:  Physical therapy:Yes low back 2019 little benefit  Doing HEP: Yes  Non-opioid medications: Yes Failed Baclofen, NSAIDs  Spinal injections: No  Spinal surgery- No.      reviewed. XR lumbar 3/15/19 reviewed. Images reviewed with pt. PMHx of Breast cancer, glaucoma, HTN. Pt is a retired . Pt now works with [employer] seasonally. Pt is claustrophobic. Pain Assessment  5/22/2019   Location of Pain Leg;Buttocks   Pain Location Comment -   Location Modifiers Right   Severity of Pain 9   Quality of Pain Dull;Aching; Sharp   Quality of Pain Comment stabbing   Duration of Pain Persistent   Frequency of Pain Constant   Aggravating Factors Other (Comment)   Aggravating Factors Comment Day to day activity   Limiting Behavior Yes   Relieving Factors Rest;Heat;Exercises   Relieving Factors Comment -   Result of Injury -         PAST MEDICAL HISTORY   Past Medical History:   Diagnosis Date    Anemia     Arthritis     Cancer ([facility] [address] 2014    breast    Ductal carcinoma in situ of breast     Glaucoma     Hypertension     Seizures ([facility] [address]     PAST       Past Surgical History:   Procedure Laterality Date    HX HYSTERECTOMY      partial    HX MASTECTOMY  4/30/2014    RIGHT PARTIAL MASTECTOMY WITH NEEDLE LOCALIZATION MAMMOGRAM SENTINEL LYMPH NODE BIOPSY performed by Saul Colon MD at [address]      Current Outpatient Medications   Medication Sig Dispense Refill    diazePAM (VALIUM) 10 mg tablet TAKE 1 TAB PO 30 MINS PRIOR TO MRI (MUST HAVE SOMEONE TO DRIVE TO AND FROM THE FACILITY) 1 Tab 0    gabapentin (NEURONTIN) 300 mg capsule Take 1 tab PO QHS x1 week then increase to BID thereafter 60 Cap 1    diclofenac (VOLTAREN) 1 % gel QID to affected areas PRN for pain 500 g 5    baclofen (LIORESAL) 10 mg tablet Take 1 Tab by mouth three (3) times daily. 90 Tab 0    tamoxifen (NOLVADEX) 10 mg tablet Take  by mouth daily.  OTHER Vitamin D Po      nebivolol (BYSTOLIC) 20 mg tablet Take  by mouth daily.  torsemide (DEMADEX) 20 mg tablet Take  by mouth daily.  OTHER Occuvite eye vitamin OTC      cyanocobalamin (VITAMIN B-12) 2,500 mcg sublingual tablet Take 2,500 mcg by mouth daily.  pyridoxine (VITAMIN B-6) 200 mg tablet Take 200 mg by mouth daily.  BIOTIN PO Take 1,000 mg by mouth.       oxyCODONE-acetaminophen (PERCOCET) 5-325 mg per tablet 1 or 2 tablets by mouth every 4 hours as needed 40 Tab 0       ALLERGIES  No Known Allergies       SOCIAL HISTORY    Social History     Socioeconomic History    Marital status: SINGLE     Spouse name: Not on file    Number of children: Not on file    Years of education: Not on file    Highest education level: Not on file   Occupational History    Not on file   Social Needs    Financial resource strain: Not on file    Food insecurity:     Worry: Not on file     Inability: Not on file    Transportation needs:     Medical: Not on file     Non-medical: Not on file   Tobacco Use    Smoking status: Never Smoker    Smokeless tobacco: Never Used   Substance and Sexual Activity    Alcohol use: No     Alcohol/week: 0.0 oz    Drug use: No    Sexual activity: Not on file   Lifestyle    Physical activity:     Days per week: Not on file     Minutes per session: Not on file    Stress: Not on file   Relationships    Social connections:     Talks on phone: Not on file     Gets together: Not on file     Attends [employer] service: Not on file     Active member of club or organization: Not on file     Attends meetings of clubs or organizations: Not on file     Relationship status: Not on file    Intimate partner violence:     Fear of current or ex partner: Not on file     Emotionally abused: Not on file     Physically abused: Not on file     Forced sexual activity: Not on file   Other Topics Concern    Not on file   Social History Narrative    Not on file       FAMILY HISTORY    Family History   Problem Relation Age of Onset    Colon Cancer Mother          REVIEW OF SYSTEMS  Review of Systems   Constitutional: Negative for chills, fever and weight loss. Respiratory: Negative for shortness of breath. Cardiovascular: Negative for chest pain. Gastrointestinal: Negative for constipation. Negative for fecal incontinence   Genitourinary: Negative for dysuria. Negative for urinary incontinence   Musculoskeletal:        Per HPI   Skin: Negative for rash. Neurological: Positive for tingling and sensory change. Negative for dizziness, tremors, focal weakness and headaches. Endo/Heme/Allergies: Does not bruise/bleed easily. Psychiatric/Behavioral: The patient has insomnia. PHYSICAL EXAMINATION  Visit Vitals  BP (!) 137/92 (BP 1 Location: Left arm, BP Patient Position: Sitting)   Pulse 62   Temp 98.2 °F (36.8 °C) (Oral)   Ht 5' 3\" (1.6 m)   Wt 197 lb 6.4 oz (89.5 kg)   SpO2 100%   BMI 34.97 kg/m²          Accompanied by self. Constitutional:  Well developed, well nourished, in no acute distress. Psychiatric: Affect and mood are appropriate. Integumentary: No rashes or abrasions noted on exposed areas. Cardiovascular/Peripheral Vascular: Intact l pulses. No peripheral edema is noted BLE.   Lymphatic:  No evidence of lymphedema. No cervical lymphadenopathy. SPINE/MUSCULOSKELETAL EXAM      Lumbar spine:  No rash, ecchymosis, or gross obliquity. No fasciculations. No focal atrophy is noted. Tenderness to palpation R sciatic notch, R trochanteric bursa. SI joints non-tender. MOTOR:       Hip Flex  Quads Hamstrings Ankle DF EHL Ankle PF   Right +4/5 +4/5 +4/5 +4/5 +4/5 +4/5   Left +4/5 +4/5 +4/5 +4/5 +4/5 +4/5       Straight Leg raise negative. Ambulation without assistive device. FWB. Written by Berto Flwoers, as dictated by Latoya Murphy MD.    I, Dr. Latoya Murphy MD, confirm that all documentation is accurate. Ms. Elicia Oreilly may have a reminder for a \"due or due soon\" health maintenance. I have asked that she contact her primary care provider for follow-up on this health maintenance.

## 2019-05-22 NOTE — PATIENT INSTRUCTIONS
MRI of the Lumbar Spine: About This Test  What is it? MRI (magnetic resonance imaging) is a test that uses a magnetic field and pulses of radio wave energy to make pictures of the organs and structures inside the body. An MRI can give your doctor information about the spine in your lower back (the lumbar spine). This can include the spine, the space around the spinal cord, and the vertebrae in your lower back. When you have an MRI, you lie on a table that moves into the MRI machine. Why is this test done? An MRI of the lumbar spine can help find the cause of symptoms like back pain or leg pain, weakness, or numbness. It can help find problems such as a herniated disc, a tumor, or an infection. How can you prepare for the test?  Talk to your doctor about all your health conditions before the test. For example, tell your doctor if:  · You are allergic to any medicines. · You are or might be pregnant. · You have a pacemaker, an artificial limb, any metal pins or metal parts in your body, metal heart valves, metal clips in your brain, metal implants in your ears, or any other implanted or prosthetic medical device. · You have an intrauterine device (IUD) in place. · You get nervous in confined spaces. You may need medicine to help you relax. · You wear a patch that contains medicine. · You have kidney disease. What happens before the test?  · You will remove all metal objects, such as hearing aids, dentures, jewelry, watches, and hairpins. · You will take off all or most of your clothes and change into a gown. · You may have contrast material (dye) put into your arm through a tube called an IV. Contrast material helps doctors see specific organs, blood vessels, and most tumors. What happens during the test?  · You will lie on your back on a table that is part of the MRI scanner. Your head, chest, and arms may be held with straps to help you stay still.   · The table will slide into the space that contains the magnet. · Inside the scanner you will hear a fan and feel air moving. You may hear tapping, thumping, or snapping noises. You may be given earplugs or headphones to reduce the noise. · You will be asked to hold still during the scan. You may be asked to hold your breath for short periods. · You may be alone in the scanning room, but a technologist will watch you through a window and talk with you during the test.  What else should you know about the test?  · An MRI does not hurt. · You may feel warmth in the area being examined. This is normal.  · A dye (contrast material) that contains gadolinium may be used in this test. Be sure to tell your doctor if:  ? You are pregnant or think you may be pregnant. ? You have kidney problems. ? You've had more than one test that used gadolinium. · The U.S. Food and Drug Administration (FDA) has safety warnings about gadolinium. But for most people, the benefit of its use in this test outweighs the risk. · If a dye is used, you may feel a quick sting or pinch and some coolness when the IV is started. The dye may give you a metallic taste in your mouth. Some people feel sick to their stomach or get a headache. · If you breastfeed and are concerned about whether the dye used in this test is safe, talk to your doctor. Most experts believe that very little dye passes into breast milk and even less is passed on to the baby. But if you prefer, you can store some of your breast milk ahead of time and use it for a day or two after the test.  How long does the test take? · The test usually takes 30 to 60 minutes. What happens after the test?  · You will probably be able to go home right away, depending on the reason for the test.  · You can go back to your usual activities right away. Follow-up care is a key part of your treatment and safety. Be sure to make and go to all appointments, and call your doctor if you are having problems.  It's also a good idea to keep a list of the medicines you take. Ask your doctor when you can expect to have your test results. Where can you learn more? Go to http://vickie-dodie.info/. Enter C087 in the search box to learn more about \"MRI of the Lumbar Spine: About This Test.\"  Current as of: June 25, 2018  Content Version: 11.9  © 7648-6582 [facility]. Care instructions adapted under license by [employer] (which disclaims liability or warranty for this information). If you have questions about a medical condition or this instruction, always ask your healthcare professional. [place] [address] any warranty or liability for your use of this information. Sciatica: Care Instructions  Your Care Instructions    Sciatica (say \"rfu-MN-ls-kuh\") is an irritation of one of the sciatic nerves, which come from the spinal cord in the lower back. The sciatic nerves and their branches extend down through the buttock to the foot. Sciatica can develop when an injured disc in the back presses against a spinal nerve root. Its main symptom is pain, numbness, or weakness that is often worse in the leg or foot than in the back. Sciatica often will improve and go away with time. Early treatment usually includes medicines and exercises to relieve pain. Follow-up care is a key part of your treatment and safety. Be sure to make and go to all appointments, and call your doctor if you are having problems. It's also a good idea to know your test results and keep a list of the medicines you take. How can you care for yourself at home? · Take pain medicines exactly as directed. ? If the doctor gave you a prescription medicine for pain, take it as prescribed. ? If you are not taking a prescription pain medicine, ask your doctor if you can take an over-the-counter medicine. · Use heat or ice to relieve pain.   ? To apply heat, put a warm water bottle, heating pad set on low, or warm cloth on your back. Do not go to sleep with a heating pad on your skin. ? To use ice, put ice or a cold pack on the area for 10 to 20 minutes at a time. Put a thin cloth between the ice and your skin. · Avoid sitting if possible, unless it feels better than standing. · Alternate lying down with short walks. Increase your walking distance as you are able to without making your symptoms worse. · Do not do anything that makes your symptoms worse. When should you call for help? Call 911 anytime you think you may need emergency care. For example, call if:    · You are unable to move a leg at all.   [facility] your doctor now or seek immediate medical care if:    · You have new or worse symptoms in your legs or buttocks. Symptoms may include:  ? Numbness or tingling. ? Weakness. ? Pain.     · You lose bladder or bowel control.    Watch closely for changes in your health, and be sure to contact your doctor if:    · You are not getting better as expected. Where can you learn more? Go to http://vickie-dodie.info/. Enter 128-056-2455 in the search box to learn more about \"Sciatica: Care Instructions. \"  Current as of: September 20, 2018  Content Version: 11.9  © 7326-4247 [employer]. Care instructions adapted under license by [employer] (which disclaims liability or warranty for this information). If you have questions about a medical condition or this instruction, always ask your healthcare professional. [place] [address] any warranty or liability for your use of this information.

## 2019-05-23 ENCOUNTER — APPOINTMENT (OUTPATIENT)
Dept: PHYSICAL THERAPY | Age: 64
End: 2019-05-23
Payer: MEDICARE

## 2019-05-28 ENCOUNTER — APPOINTMENT (OUTPATIENT)
Dept: PHYSICAL THERAPY | Age: 64
End: 2019-05-28
Payer: MEDICARE

## 2019-05-28 ENCOUNTER — HOSPITAL ENCOUNTER (OUTPATIENT)
Dept: MAMMOGRAPHY | Age: 64
Discharge: HOME | End: 2019-05-28
Attending: INTERNAL MEDICINE
Payer: MEDICARE

## 2019-05-28 ENCOUNTER — DOCUMENTATION ONLY (OUTPATIENT)
Dept: ORTHOPEDIC SURGERY | Age: 64
End: 2019-05-28

## 2019-05-28 DIAGNOSIS — Z12.31 VISIT FOR SCREENING MAMMOGRAM: ICD-10-CM

## 2019-05-28 PROCEDURE — 77063 BREAST TOMOSYNTHESIS BI: CPT

## 2019-05-29 NOTE — PROGRESS NOTES
Order and office notes faxed to [facility] to have them set up MRI L-Spine and to notify the patient of date. They will authorize with the insurance if needed. Patient aware.

## 2019-05-30 ENCOUNTER — APPOINTMENT (OUTPATIENT)
Dept: PHYSICAL THERAPY | Age: 64
End: 2019-05-30
Payer: MEDICARE

## 2019-06-26 ENCOUNTER — OFFICE VISIT (OUTPATIENT)
Dept: ORTHOPEDIC SURGERY | Age: 64
End: 2019-06-26

## 2019-06-26 VITALS
BODY MASS INDEX: 35.01 KG/M2 | DIASTOLIC BLOOD PRESSURE: 89 MMHG | WEIGHT: 197.6 LBS | SYSTOLIC BLOOD PRESSURE: 146 MMHG | HEART RATE: 66 BPM | TEMPERATURE: 97.9 F | RESPIRATION RATE: 17 BRPM | HEIGHT: 63 IN

## 2019-06-26 DIAGNOSIS — M54.16 LUMBAR RADICULITIS: Primary | ICD-10-CM

## 2019-06-26 DIAGNOSIS — M47.816 LUMBAR SPONDYLOSIS: ICD-10-CM

## 2019-06-26 NOTE — PATIENT INSTRUCTIONS
Herniated Disc: Exercises Your Care Instructions Here are some examples of typical rehabilitation exercises for your condition. Start each exercise slowly. Ease off the exercise if you start to have pain. Your doctor or physical therapist will tell you when you can start these exercises and which ones will work best for you. How to stay safe These exercises can help you move easier and feel better. But when you first start doing them, you may have more pain in your back. This is normal. But it is important to pay close attention to your pain during and after each exercise. · Keep doing these exercises if your pain stays the same or moves from your leg and buttock more toward the middle of your spine. Pain moving out of your leg and buttock is a good sign. · Stop doing these exercises if your pain gets worse in your leg and buttock. Stop if you start to have pain in your leg and buttock that you didn't have before. Be sure to do these exercises in the order they appear. Note how your pain changes before you move to the next one. If your pain is much worse right after exercise and stays worse the next day, do not do any of these exercises. How to do the exercises 1. Rest on belly 1. Lie on your stomach, face down, with your head turned to the side. Place your arms beside your body. If this bothers your neck, place your hands, one on top of the other, underneath your forehead. This will help support your head and neck. 2. Try to relax your lower back muscles as much as you can. 3. Continue to lie on your stomach for 2 minutes. 4. If your pain spreads down your leg or increases down your leg, stop this exercise and do not do the next exercises. 2. Press-up 1. Lie on your stomach, face down. Keep your elbows tucked into your sides and under your shoulders. 2. Press your elbows down into the floor to raise your upper back.  As you do this, relax your stomach muscles. Allow your back to arch without using your back muscles. Let your low back relax completely as you arch up. 3. Hold this position for 2 minutes. 4. Repeat 2 to 4 times. 5. If your pain spreads down your leg or increases down your leg, stop this exercise and do not do the next exercises. 3. Full press-up 1. Lie on your stomach, face down. Keep your elbows tucked into your sides and under your shoulders. 2. Straighten your elbows, and push your upper body up as far as you can. Allow your lower back to sag. Keep your hips, pelvis, and legs relaxed. 3. Hold this position for 5 seconds, and then relax. 4. Repeat 10 times. Each time, try to raise your upper body a little higher and hold your arms a bit straighter. 5. If your pain spreads down your leg or gets worse down your leg, stop this exercise and do not move to the next exercise. 6. If you can't do this exercise, you may instead try the backward bend exercise that follows. 4. Backward bend 1. Stand with your feet hip-width apart. Your toes should point forward. Do not lock your knees. 2. Place your hands in the small of your back. 3. Bend backward as far as you can, keeping your knees straight. Hold this position for 2 to 3 seconds. Then return to your starting position. 4. Repeat 2 to 4 times. Each time, try to bend backward a little farther, until you bend backward as far as you can. 5. If your pain spreads down your leg or increases down your leg, stop this exercise. Follow-up care is a key part of your treatment and safety. Be sure to make and go to all appointments, and call your doctor if you are having problems. It's also a good idea to know your test results and keep a list of the medicines you take. Where can you learn more? Go to http://vickie-dodie.info/. Enter [phone] in the search box to learn more about \"Herniated Disc: Exercises. \" Current as of: September 20, 2018 Content Version: 11.9 © 3362-1632 [employer]. Care instructions adapted under license by [employer] (which disclaims liability or warranty for this information). If you have questions about a medical condition or this instruction, always ask your healthcare professional. [address] any warranty or liability for your use of this information.

## 2019-06-26 NOTE — PROGRESS NOTES
[facility] [address].  [address], [address]  [place] [address]  Phone: [phone]  Fax: [phone]        Sunday Jordan  : 1955  PCP: Yanni Shi MD    PROGRESS NOTE      ASSESSMENT AND PLAN    Diagnoses and all orders for this visit:    1. Lumbar radiculitis-improved    2. Lumbar spondylosis       1. Advised to continue HEP. Stay active as tolerated. Recommend that patient look into Silver Sneakers. 2. No indications for surgery or spinal injections at this time. 3.  Recommended volunteering in the community regularly to have a scheduled activities. 4. Continue Gabapentin 100 mg at bed time and PRN for flare ups. 5. Avoid repetitive bending, lifting, and twisting. No lifting more than 20 lbs. 6. Advised to avoid processed foods; rather eat fresh fruits, vegetables and meats to reduce inflammation  7. Given information on disc herniation. F/U PRN      HISTORY OF PRESENT ILLNESS  Rubi Yeh is a 70 y.o. female. Last visit pt was sent to have a Lumbar spine MRI. Images reviewed with the pt. At last visit, pt was prescribed a trial of gabapentin 100 mg . Pt notes that she took too much gabapentin and OTC meds on one day. She says that since then, she has limited her use of gabapentin from BID to PRN. Feeling better. She notes significant benefit from gabapentin and increased walking tolerance. Pt states that she is trying to get into the Silver Sne[employer] program for the physical and social benefits. She says that she is trying to eat healthier and increase her activity. Location of pain: R buttock to calf  Does pain radiate into extremities: RLE L4 with numbness-improved  Does patient have weakness: no   Pt denies saddle paresthesias. Medications pt is on: Gabapentin 300mg BID, with benefit, now PRN. Tylenol Arthritis PRN with benefit. Aleve PRN, benefit. Advil PRN, benefit. Patches PRN. Voltaren gel PRN.  Pt denies recent ED visits or hospitalizations. Denies persistent fevers, chills, weight changes, neurogenic bowel or bladder symptoms.      Treatments patient has tried:  Physical therapy:Yes low back 2019 little benefit  Doing HEP: Yes  Non-opioid medications: Yes Failed Baclofen, NSAIDs  Spinal injections: No  Spinal surgery- No.   Last L MRI 6/2019: HNP at R L4-L5      reviewed. PMHx of Breast cancer, glaucoma, HTN. Pt is a retired . Pt now works with cruises seasonally. Pt is claustrophobic. Pain Assessment  6/26/2019   Location of Pain Back   Pain Location Comment -   Location Modifiers -   Severity of Pain 0   Quality of Pain -   Quality of Pain Comment -   Duration of Pain -   Frequency of Pain Intermittent   Aggravating Factors -   Aggravating Factors Comment -   Limiting Behavior -   Relieving Factors -   Relieving Factors Comment -   Result of Injury -           PAST MEDICAL HISTORY   Past Medical History:   Diagnosis Date    Anemia     Arthritis     Cancer ([facility] [address] 2014    breast    Ductal carcinoma in situ of breast     Glaucoma     Hypertension     Seizures (HCC)     PAST       Past Surgical History:   Procedure Laterality Date    HX HYSTERECTOMY      partial    HX MASTECTOMY  4/30/2014    RIGHT PARTIAL MASTECTOMY WITH NEEDLE LOCALIZATION MAMMOGRAM SENTINEL LYMPH NODE BIOPSY performed by Yariel Doe MD at [address] [facility]      MEDICATIONS      Current Outpatient Medications   Medication Sig Dispense Refill    diclofenac (VOLTAREN) 1 % gel QID to affected areas PRN for pain 500 g 5    tamoxifen (NOLVADEX) 10 mg tablet Take  by mouth daily.  OTHER Vitamin D Po      nebivolol (BYSTOLIC) 20 mg tablet Take  by mouth daily.  torsemide (DEMADEX) 20 mg tablet Take  by mouth daily.  OTHER Occuvite eye vitamin OTC      cyanocobalamin (VITAMIN B-12) 2,500 mcg sublingual tablet Take 2,500 mcg by mouth daily.  pyridoxine (VITAMIN B-6) 200 mg tablet Take 200 mg by mouth daily.       BIOTIN PO Take 1,000 mg by mouth.  diazePAM (VALIUM) 10 mg tablet TAKE 1 TAB PO 30 MINS PRIOR TO MRI (MUST HAVE SOMEONE TO DRIVE TO AND FROM THE FACILITY) 1 Tab 0    gabapentin (NEURONTIN) 300 mg capsule Take 1 tab PO QHS x1 week then increase to BID thereafter 60 Cap 1    baclofen (LIORESAL) 10 mg tablet Take 1 Tab by mouth three (3) times daily.  90 Tab 0    oxyCODONE-acetaminophen (PERCOCET) 5-325 mg per tablet 1 or 2 tablets by mouth every 4 hours as needed 40 Tab 0        ALLERGIES  No Known Allergies       SOCIAL HISTORY    Social History     Socioeconomic History    Marital status: SINGLE     Spouse name: Not on file    Number of children: Not on file    Years of education: Not on file    Highest education level: Not on file   Occupational History    Not on file   Social Needs    Financial resource strain: Not on file    Food insecurity:     Worry: Not on file     Inability: Not on file    Transportation needs:     Medical: Not on file     Non-medical: Not on file   Tobacco Use    Smoking status: Never Smoker    Smokeless tobacco: Never Used   Substance and Sexual Activity    Alcohol use: No     Alcohol/week: 0.0 oz    Drug use: No    Sexual activity: Not on file   Lifestyle    Physical activity:     Days per week: Not on file     Minutes per session: Not on file    Stress: Not on file   Relationships    Social connections:     Talks on phone: Not on file     Gets together: Not on file     Attends Islam service: Not on file     Active member of club or organization: Not on file     Attends meetings of clubs or organizations: Not on file     Relationship status: Not on file    Intimate partner violence:     Fear of current or ex partner: Not on file     Emotionally abused: Not on file     Physically abused: Not on file     Forced sexual activity: Not on file   Other Topics Concern    Not on file   Social History Narrative    Not on file     Socioeconomic History    Marital status: SINGLE     Spouse name: Not on file    Number of children: Not on file    Years of education: Not on file    Highest education level: Not on file   Occupational History    Not on file   Social Needs    Financial resource strain: Not on file    Food insecurity:     Worry: Not on file     Inability: Not on file    Transportation needs:     Medical: Not on file     Non-medical: Not on file   Tobacco Use    Smoking status: Never Smoker    Smokeless tobacco: Never Used   Substance and Sexual Activity    Alcohol use: No     Alcohol/week: 0.0 oz    Drug use: No    Sexual activity: Not on file   Lifestyle    Physical activity:     Days per week: Not on file     Minutes per session: Not on file    Stress: Not on file   Relationships    Social connections:     Talks on phone: Not on file     Gets together: Not on file     Attends Pentecostalism service: Not on file     Active member of club or organization: Not on file     Attends meetings of clubs or organizations: Not on file     Relationship status: Not on file    Intimate partner violence:     Fear of current or ex partner: Not on file     Emotionally abused: Not on file     Physically abused: Not on file     Forced sexual activity: Not on file   Other Topics Concern    Not on file   Social History Narrative    Not on file      Problem Relation Age of Onset    Colon Cancer Mother        REVIEW OF SYSTEMS  Review of Systems   Constitutional: Negative for chills, fever and weight loss. Respiratory: Negative for shortness of breath. Cardiovascular: Negative for chest pain. Gastrointestinal: Negative for constipation. Negative for fecal incontinence   Genitourinary: Negative for dysuria. Negative for urinary incontinence   Musculoskeletal:        Per HPI   Skin: Negative for rash. Neurological: Negative for dizziness, tingling, tremors, focal weakness and headaches. Endo/Heme/Allergies: Does not bruise/bleed easily. Psychiatric/Behavioral: The patient does not have insomnia. PHYSICAL EXAMINATION  Visit Vitals  /89 (BP 1 Location: Left arm, BP Patient Position: Sitting)   Pulse 66   Temp 97.9 °F (36.6 °C) (Oral)   Resp 17   Ht 5' 3\" (1.6 m)   Wt 197 lb 9.6 oz (89.6 kg)   BMI 35.00 kg/m²         Accompanied by self. Constitutional:  Well developed, well nourished, in no acute distress. Psychiatric: Affect and mood are appropriate. Integumentary: No rashes or abrasions noted on exposed areas. Cardiovascular/Peripheral Vascular: No peripheral edema is noted BLE. SPINE/MUSCULOSKELETAL EXAM      Lumbar spine:  No rash, ecchymosis, or gross obliquity. No fasciculations. No focal atrophy is noted. No tenderness to palpation. No tenderness to palpation at the sciatic notch. SI joints non-tender. Trochanters non tender. MOTOR:       Hip Flex  Quads Hamstrings Ankle DF EHL Ankle PF   Right +4/5 +4/5 +4/5 +4/5 +4/5 +4/5   Left +4/5 +4/5 +4/5 +4/5 +4/5 +4/5     Straight Leg raise negative   Ambulation without assistive device. FWB. Written by Ag Mejia, as dictated by Yanira Souza MD.    I, Dr. Yanira Souza MD, confirm that all documentation is accurate. Ms. Lino Gonzalez may have a reminder for a \"due or due soon\" health maintenance. I have asked that she contact her primary care provider for follow-up on this health maintenance.

## 2019-07-05 DIAGNOSIS — M54.16 LUMBAR RADICULITIS: ICD-10-CM

## 2019-07-05 DIAGNOSIS — M47.816 LUMBAR SPONDYLOSIS: ICD-10-CM

## 2020-03-02 ENCOUNTER — HOSPITAL ENCOUNTER (OUTPATIENT)
Dept: GENERAL RADIOLOGY | Age: 65
Discharge: HOME | End: 2020-03-02
Payer: MEDICARE

## 2020-03-02 DIAGNOSIS — I10 ESSENTIAL HYPERTENSION, MALIGNANT: ICD-10-CM

## 2020-03-02 PROCEDURE — 71046 X-RAY EXAM CHEST 2 VIEWS: CPT

## 2020-06-22 ENCOUNTER — HOSPITAL ENCOUNTER (OUTPATIENT)
Dept: MAMMOGRAPHY | Age: 65
Discharge: HOME | End: 2020-06-22
Attending: INTERNAL MEDICINE
Payer: MEDICARE

## 2020-06-22 DIAGNOSIS — Z12.31 VISIT FOR SCREENING MAMMOGRAM: ICD-10-CM

## 2020-06-22 PROCEDURE — 77063 BREAST TOMOSYNTHESIS BI: CPT

## 2021-06-24 ENCOUNTER — HOSPITAL ENCOUNTER (OUTPATIENT)
Dept: MAMMOGRAPHY | Age: 66
Discharge: HOME | End: 2021-06-24
Attending: INTERNAL MEDICINE
Payer: MEDICARE

## 2021-06-24 DIAGNOSIS — Z12.31 SCREENING MAMMOGRAM, ENCOUNTER FOR: ICD-10-CM

## 2021-06-24 PROCEDURE — 77063 BREAST TOMOSYNTHESIS BI: CPT

## 2022-03-19 PROBLEM — E66.01 SEVERE OBESITY (HCC): Status: ACTIVE | Noted: 2019-04-16

## 2022-04-07 ENCOUNTER — TRANSCRIBE ORDER (OUTPATIENT)
Dept: SCHEDULING | Age: 67
End: 2022-04-07

## 2022-04-07 DIAGNOSIS — Z12.31 VISIT FOR SCREENING MAMMOGRAM: Primary | ICD-10-CM

## 2022-06-27 ENCOUNTER — HOSPITAL ENCOUNTER (OUTPATIENT)
Dept: MAMMOGRAPHY | Age: 67
Discharge: HOME | End: 2022-06-27
Attending: INTERNAL MEDICINE
Payer: MEDICARE

## 2022-06-27 DIAGNOSIS — Z12.31 VISIT FOR SCREENING MAMMOGRAM: ICD-10-CM

## 2022-06-27 PROCEDURE — 77063 BREAST TOMOSYNTHESIS BI: CPT

## 2022-08-01 ENCOUNTER — HOSPITAL ENCOUNTER (INPATIENT)
Age: 67
LOS: 16 days | Discharge: NURSING, REHAB OR LONG-TERM CARE | DRG: 853 | End: 2022-08-17
Attending: EMERGENCY MEDICINE | Admitting: FAMILY MEDICINE
Payer: MEDICARE

## 2022-08-01 ENCOUNTER — APPOINTMENT (OUTPATIENT)
Dept: GENERAL RADIOLOGY | Age: 67
DRG: 853 | End: 2022-08-01
Attending: PHYSICIAN ASSISTANT
Payer: MEDICARE

## 2022-08-01 DIAGNOSIS — D72.829 LEUKOCYTOSIS, UNSPECIFIED TYPE: ICD-10-CM

## 2022-08-01 DIAGNOSIS — S31.000A OPEN WOUND OF LOWER BACK: ICD-10-CM

## 2022-08-01 DIAGNOSIS — R65.20 SEVERE SEPSIS (HCC): ICD-10-CM

## 2022-08-01 DIAGNOSIS — Z74.09 IMPAIRED MOBILITY AND ADLS: ICD-10-CM

## 2022-08-01 DIAGNOSIS — Z71.89 GOALS OF CARE, COUNSELING/DISCUSSION: ICD-10-CM

## 2022-08-01 DIAGNOSIS — G93.89 BRAIN MASS: ICD-10-CM

## 2022-08-01 DIAGNOSIS — S81.802A WOUND OF LEFT LOWER EXTREMITY, INITIAL ENCOUNTER: ICD-10-CM

## 2022-08-01 DIAGNOSIS — A41.9 SEVERE SEPSIS (HCC): ICD-10-CM

## 2022-08-01 DIAGNOSIS — C50.919 MALIGNANT NEOPLASM OF FEMALE BREAST, UNSPECIFIED ESTROGEN RECEPTOR STATUS, UNSPECIFIED LATERALITY, UNSPECIFIED SITE OF BREAST (HCC): ICD-10-CM

## 2022-08-01 DIAGNOSIS — T07.XXXA WOUNDS, MULTIPLE: ICD-10-CM

## 2022-08-01 DIAGNOSIS — Z78.9 IMPAIRED MOBILITY AND ADLS: ICD-10-CM

## 2022-08-01 DIAGNOSIS — A41.9 SEPSIS, DUE TO UNSPECIFIED ORGANISM, UNSPECIFIED WHETHER ACUTE ORGAN DYSFUNCTION PRESENT (HCC): Primary | ICD-10-CM

## 2022-08-01 DIAGNOSIS — R53.81 DEBILITY: ICD-10-CM

## 2022-08-01 DIAGNOSIS — R56.9 SEIZURE (HCC): ICD-10-CM

## 2022-08-01 PROBLEM — E03.9 ACQUIRED HYPOTHYROIDISM: Chronic | Status: ACTIVE | Noted: 2022-08-01

## 2022-08-01 PROBLEM — I10 PRIMARY HYPERTENSION: Chronic | Status: ACTIVE | Noted: 2022-08-01

## 2022-08-01 PROBLEM — R53.2 FUNCTIONAL QUADRIPLEGIA (HCC): Chronic | Status: RESOLVED | Noted: 2022-08-01 | Resolved: 2022-08-01

## 2022-08-01 PROBLEM — H40.9 GLAUCOMA: Chronic | Status: ACTIVE | Noted: 2022-08-01

## 2022-08-01 PROBLEM — R53.2 FUNCTIONAL QUADRIPLEGIA (HCC): Chronic | Status: ACTIVE | Noted: 2022-08-01

## 2022-08-01 PROBLEM — G89.3 CANCER ASSOCIATED PAIN: Chronic | Status: ACTIVE | Noted: 2022-08-01

## 2022-08-01 PROBLEM — E66.9 OBESITY (BMI 30.0-34.9): Chronic | Status: ACTIVE | Noted: 2022-08-01

## 2022-08-01 PROBLEM — E66.9 OBESITY (BMI 30.0-34.9): Status: ACTIVE | Noted: 2022-08-01

## 2022-08-01 PROBLEM — I10 PRIMARY HYPERTENSION: Status: ACTIVE | Noted: 2022-08-01

## 2022-08-01 PROBLEM — R53.2 FUNCTIONAL QUADRIPLEGIA (HCC): Status: ACTIVE | Noted: 2022-08-01

## 2022-08-01 PROBLEM — G89.3 CANCER ASSOCIATED PAIN: Status: ACTIVE | Noted: 2022-08-01

## 2022-08-01 PROBLEM — E03.9 ACQUIRED HYPOTHYROIDISM: Status: ACTIVE | Noted: 2022-08-01

## 2022-08-01 PROBLEM — R53.2 FUNCTIONAL QUADRIPLEGIA (HCC): Status: RESOLVED | Noted: 2022-08-01 | Resolved: 2022-08-01

## 2022-08-01 PROBLEM — H40.9 GLAUCOMA: Status: ACTIVE | Noted: 2022-08-01

## 2022-08-01 LAB
ALBUMIN SERPL-MCNC: 2.9 G/DL (ref 3.4–5)
ALBUMIN/GLOB SERPL: 0.8 {RATIO} (ref 0.8–1.7)
ALP SERPL-CCNC: 123 U/L (ref 45–117)
ALT SERPL-CCNC: 42 U/L (ref 13–56)
ANION GAP SERPL CALC-SCNC: 6 MMOL/L (ref 3–18)
APPEARANCE UR: CLEAR
AST SERPL-CCNC: 36 U/L (ref 10–38)
BACTERIA URNS QL MICRO: ABNORMAL /HPF
BASOPHILS # BLD: 0 K/UL (ref 0–0.1)
BASOPHILS NFR BLD: 0 % (ref 0–2)
BILIRUB SERPL-MCNC: 0.9 MG/DL (ref 0.2–1)
BILIRUB UR QL: ABNORMAL
BNP SERPL-MCNC: 216 PG/ML (ref 0–900)
BUN SERPL-MCNC: 16 MG/DL (ref 7–18)
BUN/CREAT SERPL: 16 (ref 12–20)
CALCIUM SERPL-MCNC: 9.3 MG/DL (ref 8.5–10.1)
CHLORIDE SERPL-SCNC: 103 MMOL/L (ref 100–111)
CK SERPL-CCNC: 329 U/L (ref 26–192)
CO2 SERPL-SCNC: 31 MMOL/L (ref 21–32)
COLOR UR: ABNORMAL
CREAT SERPL-MCNC: 1 MG/DL (ref 0.6–1.3)
DIFFERENTIAL METHOD BLD: ABNORMAL
EOSINOPHIL # BLD: 0.1 K/UL (ref 0–0.4)
EOSINOPHIL NFR BLD: 0 % (ref 0–5)
EPITH CASTS URNS QL MICRO: ABNORMAL /LPF (ref 0–5)
ERYTHROCYTE [DISTWIDTH] IN BLOOD BY AUTOMATED COUNT: 13.2 % (ref 11.6–14.5)
GLOBULIN SER CALC-MCNC: 3.7 G/DL (ref 2–4)
GLUCOSE SERPL-MCNC: 109 MG/DL (ref 74–99)
GLUCOSE UR STRIP.AUTO-MCNC: NEGATIVE MG/DL
HCT VFR BLD AUTO: 36.5 % (ref 35–45)
HGB BLD-MCNC: 12.2 G/DL (ref 12–16)
HGB UR QL STRIP: NEGATIVE
IMM GRANULOCYTES # BLD AUTO: 0.1 K/UL (ref 0–0.04)
IMM GRANULOCYTES NFR BLD AUTO: 1 % (ref 0–0.5)
KETONES UR QL STRIP.AUTO: NEGATIVE MG/DL
LACTATE BLD-SCNC: 3.95 MMOL/L (ref 0.4–2)
LEUKOCYTE ESTERASE UR QL STRIP.AUTO: ABNORMAL
LYMPHOCYTES # BLD: 2.4 K/UL (ref 0.9–3.6)
LYMPHOCYTES NFR BLD: 14 % (ref 21–52)
MAGNESIUM SERPL-MCNC: 2.2 MG/DL (ref 1.6–2.6)
MCH RBC QN AUTO: 30 PG (ref 24–34)
MCHC RBC AUTO-ENTMCNC: 33.4 G/DL (ref 31–37)
MCV RBC AUTO: 89.9 FL (ref 78–100)
MONOCYTES # BLD: 1.2 K/UL (ref 0.05–1.2)
MONOCYTES NFR BLD: 7 % (ref 3–10)
NEUTS SEG # BLD: 12.8 K/UL (ref 1.8–8)
NEUTS SEG NFR BLD: 78 % (ref 40–73)
NITRITE UR QL STRIP.AUTO: NEGATIVE
NRBC # BLD: 0 K/UL (ref 0–0.01)
NRBC BLD-RTO: 0 PER 100 WBC
PH UR STRIP: 5.5 [PH] (ref 5–8)
PLATELET # BLD AUTO: 232 K/UL (ref 135–420)
PMV BLD AUTO: 11.2 FL (ref 9.2–11.8)
POTASSIUM SERPL-SCNC: 3.4 MMOL/L (ref 3.5–5.5)
PROT SERPL-MCNC: 6.6 G/DL (ref 6.4–8.2)
PROT UR STRIP-MCNC: NEGATIVE MG/DL
RBC # BLD AUTO: 4.06 M/UL (ref 4.2–5.3)
RBC #/AREA URNS HPF: ABNORMAL /HPF (ref 0–5)
SODIUM SERPL-SCNC: 140 MMOL/L (ref 136–145)
SP GR UR REFRACTOMETRY: 1.02 (ref 1–1.03)
TROPONIN-HIGH SENSITIVITY: 9 NG/L (ref 0–54)
UROBILINOGEN UR QL STRIP.AUTO: 1 EU/DL (ref 0.2–1)
WBC # BLD AUTO: 16.5 K/UL (ref 4.6–13.2)
WBC URNS QL MICRO: ABNORMAL /HPF (ref 0–4)

## 2022-08-01 PROCEDURE — 96365 THER/PROPH/DIAG IV INF INIT: CPT

## 2022-08-01 PROCEDURE — 87205 SMEAR GRAM STAIN: CPT

## 2022-08-01 PROCEDURE — 74011000258 HC RX REV CODE- 258: Performed by: PHYSICIAN ASSISTANT

## 2022-08-01 PROCEDURE — 87040 BLOOD CULTURE FOR BACTERIA: CPT

## 2022-08-01 PROCEDURE — 83735 ASSAY OF MAGNESIUM: CPT

## 2022-08-01 PROCEDURE — 87076 CULTURE ANAEROBE IDENT EACH: CPT

## 2022-08-01 PROCEDURE — 99223 1ST HOSP IP/OBS HIGH 75: CPT | Performed by: INTERNAL MEDICINE

## 2022-08-01 PROCEDURE — 83880 ASSAY OF NATRIURETIC PEPTIDE: CPT

## 2022-08-01 PROCEDURE — 87186 SC STD MICRODIL/AGAR DIL: CPT

## 2022-08-01 PROCEDURE — 85025 COMPLETE CBC W/AUTO DIFF WBC: CPT

## 2022-08-01 PROCEDURE — 65270000046 HC RM TELEMETRY

## 2022-08-01 PROCEDURE — 74011250636 HC RX REV CODE- 250/636: Performed by: PHYSICIAN ASSISTANT

## 2022-08-01 PROCEDURE — 87077 CULTURE AEROBIC IDENTIFY: CPT

## 2022-08-01 PROCEDURE — 81001 URINALYSIS AUTO W/SCOPE: CPT

## 2022-08-01 PROCEDURE — 87150 DNA/RNA AMPLIFIED PROBE: CPT

## 2022-08-01 PROCEDURE — 80053 COMPREHEN METABOLIC PANEL: CPT

## 2022-08-01 PROCEDURE — 83605 ASSAY OF LACTIC ACID: CPT

## 2022-08-01 PROCEDURE — 71045 X-RAY EXAM CHEST 1 VIEW: CPT

## 2022-08-01 PROCEDURE — 82550 ASSAY OF CK (CPK): CPT

## 2022-08-01 PROCEDURE — 96368 THER/DIAG CONCURRENT INF: CPT

## 2022-08-01 PROCEDURE — 84484 ASSAY OF TROPONIN QUANT: CPT

## 2022-08-01 PROCEDURE — 93005 ELECTROCARDIOGRAM TRACING: CPT

## 2022-08-01 PROCEDURE — 99285 EMERGENCY DEPT VISIT HI MDM: CPT

## 2022-08-01 RX ORDER — VANCOMYCIN HYDROCHLORIDE
1250 EVERY 24 HOURS
Status: DISCONTINUED | OUTPATIENT
Start: 2022-08-02 | End: 2022-08-03 | Stop reason: ALTCHOICE

## 2022-08-01 RX ORDER — CHOLECALCIFEROL (VITAMIN D3) 125 MCG
5 CAPSULE ORAL
Status: DISCONTINUED | OUTPATIENT
Start: 2022-08-01 | End: 2022-08-04

## 2022-08-01 RX ORDER — AMLODIPINE BESYLATE 10 MG/1
10 TABLET ORAL DAILY
Status: DISCONTINUED | OUTPATIENT
Start: 2022-08-02 | End: 2022-08-17 | Stop reason: HOSPADM

## 2022-08-01 RX ORDER — HYDRALAZINE HYDROCHLORIDE 25 MG/1
25 TABLET, FILM COATED ORAL 3 TIMES DAILY
Status: DISCONTINUED | OUTPATIENT
Start: 2022-08-02 | End: 2022-08-17 | Stop reason: HOSPADM

## 2022-08-01 RX ORDER — DIVALPROEX SODIUM 500 MG/1
500 TABLET, EXTENDED RELEASE ORAL DAILY
Status: DISCONTINUED | OUTPATIENT
Start: 2022-08-02 | End: 2022-08-11

## 2022-08-01 RX ORDER — ONDANSETRON 4 MG/1
4 TABLET, ORALLY DISINTEGRATING ORAL
Status: DISCONTINUED | OUTPATIENT
Start: 2022-08-01 | End: 2022-08-17 | Stop reason: HOSPADM

## 2022-08-01 RX ORDER — AMLODIPINE BESYLATE 10 MG/1
10 TABLET ORAL DAILY
COMMUNITY

## 2022-08-01 RX ORDER — IPRATROPIUM BROMIDE AND ALBUTEROL SULFATE 2.5; .5 MG/3ML; MG/3ML
3 SOLUTION RESPIRATORY (INHALATION)
Status: DISCONTINUED | OUTPATIENT
Start: 2022-08-01 | End: 2022-08-17 | Stop reason: HOSPADM

## 2022-08-01 RX ORDER — SODIUM CHLORIDE 0.9 % (FLUSH) 0.9 %
5-40 SYRINGE (ML) INJECTION AS NEEDED
Status: DISCONTINUED | OUTPATIENT
Start: 2022-08-01 | End: 2022-08-17 | Stop reason: HOSPADM

## 2022-08-01 RX ORDER — LEVOFLOXACIN 5 MG/ML
750 INJECTION, SOLUTION INTRAVENOUS EVERY 24 HOURS
Status: DISCONTINUED | OUTPATIENT
Start: 2022-08-01 | End: 2022-08-03 | Stop reason: ALTCHOICE

## 2022-08-01 RX ORDER — OXYCODONE AND ACETAMINOPHEN 5; 325 MG/1; MG/1
1 TABLET ORAL
Status: DISCONTINUED | OUTPATIENT
Start: 2022-08-01 | End: 2022-08-17 | Stop reason: HOSPADM

## 2022-08-01 RX ORDER — GABAPENTIN 300 MG/1
300 CAPSULE ORAL 2 TIMES DAILY
Status: DISCONTINUED | OUTPATIENT
Start: 2022-08-02 | End: 2022-08-17 | Stop reason: HOSPADM

## 2022-08-01 RX ORDER — ACETAMINOPHEN 325 MG/1
650 TABLET ORAL
Status: DISCONTINUED | OUTPATIENT
Start: 2022-08-01 | End: 2022-08-17 | Stop reason: HOSPADM

## 2022-08-01 RX ORDER — DIVALPROEX SODIUM 500 MG/1
500 TABLET, EXTENDED RELEASE ORAL
Status: ON HOLD | COMMUNITY
End: 2022-08-17 | Stop reason: SDUPTHER

## 2022-08-01 RX ORDER — SODIUM CHLORIDE 0.9 % (FLUSH) 0.9 %
5-10 SYRINGE (ML) INJECTION AS NEEDED
Status: DISCONTINUED | OUTPATIENT
Start: 2022-08-01 | End: 2022-08-06

## 2022-08-01 RX ORDER — VITAMIN B COMPLEX
2500 TABLET ORAL DAILY
Status: DISCONTINUED | OUTPATIENT
Start: 2022-08-02 | End: 2022-08-17 | Stop reason: HOSPADM

## 2022-08-01 RX ORDER — ATENOLOL 50 MG/1
100 TABLET ORAL DAILY
Status: DISCONTINUED | OUTPATIENT
Start: 2022-08-02 | End: 2022-08-17 | Stop reason: HOSPADM

## 2022-08-01 RX ORDER — ONDANSETRON 2 MG/ML
4 INJECTION INTRAMUSCULAR; INTRAVENOUS
Status: DISCONTINUED | OUTPATIENT
Start: 2022-08-01 | End: 2022-08-17 | Stop reason: HOSPADM

## 2022-08-01 RX ORDER — DEXAMETHASONE 2 MG/1
TABLET ORAL EVERY 12 HOURS
COMMUNITY

## 2022-08-01 RX ORDER — LEVOTHYROXINE SODIUM 88 UG/1
88 TABLET ORAL
Status: DISCONTINUED | OUTPATIENT
Start: 2022-08-02 | End: 2022-08-17 | Stop reason: HOSPADM

## 2022-08-01 RX ORDER — LEVOTHYROXINE SODIUM 88 UG/1
88 TABLET ORAL
COMMUNITY

## 2022-08-01 RX ORDER — SODIUM CHLORIDE 0.9 % (FLUSH) 0.9 %
5-40 SYRINGE (ML) INJECTION EVERY 8 HOURS
Status: DISCONTINUED | OUTPATIENT
Start: 2022-08-01 | End: 2022-08-17 | Stop reason: HOSPADM

## 2022-08-01 RX ORDER — DEXAMETHASONE 2 MG/1
2 TABLET ORAL EVERY 12 HOURS
Status: DISCONTINUED | OUTPATIENT
Start: 2022-08-02 | End: 2022-08-17 | Stop reason: HOSPADM

## 2022-08-01 RX ORDER — NALOXONE HYDROCHLORIDE 0.4 MG/ML
0.4 INJECTION, SOLUTION INTRAMUSCULAR; INTRAVENOUS; SUBCUTANEOUS
Status: DISCONTINUED | OUTPATIENT
Start: 2022-08-01 | End: 2022-08-17 | Stop reason: HOSPADM

## 2022-08-01 RX ORDER — TAMOXIFEN CITRATE 10 MG/1
10 TABLET, FILM COATED ORAL DAILY
Status: DISCONTINUED | OUTPATIENT
Start: 2022-08-02 | End: 2022-08-02

## 2022-08-01 RX ORDER — HYDRALAZINE HYDROCHLORIDE 25 MG/1
25 TABLET, FILM COATED ORAL 3 TIMES DAILY
COMMUNITY

## 2022-08-01 RX ORDER — ACETAMINOPHEN 650 MG/1
650 SUPPOSITORY RECTAL
Status: DISCONTINUED | OUTPATIENT
Start: 2022-08-01 | End: 2022-08-17 | Stop reason: HOSPADM

## 2022-08-01 RX ORDER — LANOLIN ALCOHOL/MO/W.PET/CERES
200 CREAM (GRAM) TOPICAL DAILY
Status: DISCONTINUED | OUTPATIENT
Start: 2022-08-02 | End: 2022-08-17 | Stop reason: HOSPADM

## 2022-08-01 RX ORDER — POLYETHYLENE GLYCOL 3350 17 G/17G
17 POWDER, FOR SOLUTION ORAL DAILY PRN
Status: DISCONTINUED | OUTPATIENT
Start: 2022-08-01 | End: 2022-08-17 | Stop reason: HOSPADM

## 2022-08-01 RX ADMIN — LEVOFLOXACIN 750 MG: 5 INJECTION, SOLUTION INTRAVENOUS at 16:39

## 2022-08-01 RX ADMIN — PIPERACILLIN AND TAZOBACTAM 4.5 G: 4; .5 INJECTION, POWDER, FOR SOLUTION INTRAVENOUS at 16:38

## 2022-08-01 RX ADMIN — SODIUM CHLORIDE 1000 ML: 900 INJECTION, SOLUTION INTRAVENOUS at 17:09

## 2022-08-01 RX ADMIN — VANCOMYCIN HYDROCHLORIDE 1000 MG: 1 INJECTION, POWDER, LYOPHILIZED, FOR SOLUTION INTRAVENOUS at 16:49

## 2022-08-01 NOTE — Clinical Note
Status[de-identified] INPATIENT [101]   Type of Bed: Telemetry [19]   Cardiac Monitoring Required?: Yes   Inpatient Hospitalization Certified Necessary for the Following Reasons: 3.  Patient receiving treatment that can only be provided in an inpatient setting (further clarification in H&P documentation)   Admitting Diagnosis: Sepsis ([facility] [address] [4421776]   Admitting Diagnosis: Leukocytosis [200030]   Admitting Diagnosis: Open wound of lower back [8943762]   Admitting Physician: Moe Cadena [[id number]]   Attending Physician: Moe Cadena [[id number]]   Estimated Length of Stay: 3-4 Midnights   Discharge Plan[de-identified] Extended Care Facility (e.g. Adult Home, Nursing Home, etc.)

## 2022-08-01 NOTE — ED PROVIDER NOTES
EMERGENCY DEPARTMENT HISTORY AND PHYSICAL EXAM      Date: 8/1/2022  Patient Name: Derek Miguel    History of Presenting Illness     Chief Complaint   Patient presents with    Skin Problem       History Provided By: Patient    HPI: Derek Miguel, 74 y.o. female PMHx significant for htn, seizures, breast cancer presents ambulatory to the ED. Patient reports blister to left foot and left ankle pain. Patient reports noticing it this morning and blister popped with yellow drainage. Denies burning herself. Patient states she does not cook. Denies history of DM. Denies fever, chills. Patient also reports wound to her lower back. Denies pain. Patient has no other complaints at this time. Patient reports she was recently admitted and d/c on 7/22. Pt lives alone at home. Denies CP, SOB, dizziness, abd pain, vomiting, diarrhea, fever, chills. There are no other complaints, changes, or physical findings at this time. PCP: Emeterio Castano MD    No current facility-administered medications on file prior to encounter. Current Outpatient Medications on File Prior to Encounter   Medication Sig Dispense Refill    amLODIPine (NORVASC) 10 mg tablet Take 10 mg by mouth in the morning. dexAMETHasone (DECADRON) 2 mg tablet Take  by mouth every twelve (12) hours. levothyroxine (SYNTHROID) 88 mcg tablet Take 88 mcg by mouth Daily (before breakfast). divalproex ER (DEPAKOTE ER) 500 mg ER tablet Take 500 mg by mouth. hydrALAZINE (APRESOLINE) 25 mg tablet Take 25 mg by mouth three (3) times daily. diazePAM (VALIUM) 10 mg tablet TAKE 1 TAB PO 30 MINS PRIOR TO MRI (MUST HAVE SOMEONE TO DRIVE TO AND FROM THE FACILITY) 1 Tab 0    gabapentin (NEURONTIN) 300 mg capsule Take 1 tab PO QHS x1 week then increase to BID thereafter 60 Cap 1    diclofenac (VOLTAREN) 1 % gel QID to affected areas PRN for pain 500 g 5    baclofen (LIORESAL) 10 mg tablet Take 1 Tab by mouth three (3) times daily.  90 Tab 0 tamoxifen (NOLVADEX) 10 mg tablet Take  by mouth daily. oxyCODONE-acetaminophen (PERCOCET) 5-325 mg per tablet 1 or 2 tablets by mouth every 4 hours as needed 40 Tab 0    OTHER Vitamin D Po      nebivolol (BYSTOLIC) 20 mg tablet Take  by mouth daily. torsemide (DEMADEX) 20 mg tablet Take  by mouth daily. OTHER Occuvite eye vitamin OTC      cyanocobalamin (VITAMIN B-12) 2,500 mcg sublingual tablet Take 2,500 mcg by mouth daily. pyridoxine (VITAMIN B-6) 200 mg tablet Take 200 mg by mouth daily. BIOTIN PO Take 1,000 mg by mouth. Past History     Past Medical History:  Past Medical History:   Diagnosis Date    Anemia     Arthritis     Cancer ([facility] [address] 2014    breast    Ductal carcinoma in situ of breast     Glaucoma     Hypertension     Seizures ([facility] [address]     PAST       Past Surgical History:  Past Surgical History:   Procedure Laterality Date    HX HYSTERECTOMY      partial    HX MASTECTOMY  4/30/2014    RIGHT PARTIAL MASTECTOMY WITH NEEDLE LOCALIZATION MAMMOGRAM SENTINEL LYMPH NODE BIOPSY performed by Isaac Lieberman MD at [facility] OR       Family History:  Family History   Problem Relation Age of Onset    Colon Cancer Mother        Social History:  Social History     Tobacco Use    Smoking status: Never    Smokeless tobacco: Never   Substance Use Topics    Alcohol use: No     Alcohol/week: 0.0 standard drinks    Drug use: No       Allergies:  No Known Allergies      Review of Systems   Review of Systems   Constitutional:  Negative for chills and fever. Respiratory:  Negative for shortness of breath. Cardiovascular:  Negative for chest pain. Gastrointestinal:  Negative for abdominal pain, nausea and vomiting. Genitourinary:  Negative for flank pain. Musculoskeletal:  Negative for back pain and myalgias. Skin:  Positive for wound. Negative for color change, pallor and rash. Neurological:  Negative for dizziness, weakness and light-headedness.    All other systems reviewed and are negative. Physical Exam   Physical Exam  Vitals and nursing note reviewed. Constitutional:       General: She is not in acute distress. Appearance: She is well-developed. Comments: Pt in NAD   HENT:      Head: Normocephalic and atraumatic. Eyes:      Conjunctiva/sclera: Conjunctivae normal.   Cardiovascular:      Rate and Rhythm: Normal rate and regular rhythm. Heart sounds: Normal heart sounds. Pulmonary:      Effort: Pulmonary effort is normal. No respiratory distress. Breath sounds: Normal breath sounds. Comments: Lungs CTA  Not working to breathe  Abdominal:      General: Bowel sounds are normal. There is no distension. Palpations: Abdomen is soft. Comments: Abdomen soft, nontender  Nondistended  No guarding or rigidity       Musculoskeletal:         General: Normal range of motion. Skin:     General: Skin is warm. Findings: No rash. Comments: Left ankle: Open wound with clear drainage. Surrounding sensation intact. Full AROM intact. <2 sec cap refill to all toes. No pustular drainage. Left foot: Blister intact to foot. TTP. No surrounding erythema or warmth.     +2 pitting edema to lower legs b/l    Low back: Healing wound to low back with purulent malodorous drainage. No induration or fluctuance. Neurological:      Mental Status: She is alert and oriented to person, place, and time.       Comments: A&Ox4  Speech clear   Psychiatric:         Behavior: Behavior normal.       Diagnostic Study Results     Labs -     Recent Results (from the past 12 hour(s))   URINALYSIS W/ RFLX MICROSCOPIC    Collection Time: 08/01/22  2:00 PM   Result Value Ref Range    Color DARK YELLOW      Appearance CLEAR      Specific gravity 1.019 1.005 - 1.030      pH (UA) 5.5 5.0 - 8.0      Protein Negative NEG mg/dL    Glucose Negative NEG mg/dL    Ketone Negative NEG mg/dL    Bilirubin SMALL (A) NEG      Blood Negative NEG      Urobilinogen 1.0 0.2 - 1.0 EU/dL    Nitrites Negative NEG      Leukocyte Esterase TRACE (A) NEG     URINE MICROSCOPIC ONLY    Collection Time: 08/01/22  2:00 PM   Result Value Ref Range    WBC 0 to 3 0 - 4 /hpf    RBC NONE 0 - 5 /hpf    Epithelial cells FEW 0 - 5 /lpf    Bacteria FEW (A) NEG /hpf   CBC WITH AUTOMATED DIFF    Collection Time: 08/01/22  2:14 PM   Result Value Ref Range    WBC 16.5 (H) 4.6 - 13.2 K/uL    RBC 4.06 (L) 4.20 - 5.30 M/uL    HGB 12.2 12.0 - 16.0 g/dL    HCT 36.5 35.0 - 45.0 %    MCV 89.9 78.0 - 100.0 FL    MCH 30.0 24.0 - 34.0 PG    MCHC 33.4 31.0 - 37.0 g/dL    RDW 13.2 11.6 - 14.5 %    PLATELET 448 788 - 117 K/uL    MPV 11.2 9.2 - 11.8 FL    NRBC 0.0 0  WBC    ABSOLUTE NRBC 0.00 0.00 - 0.01 K/uL    NEUTROPHILS 78 (H) 40 - 73 %    LYMPHOCYTES 14 (L) 21 - 52 %    MONOCYTES 7 3 - 10 %    EOSINOPHILS 0 0 - 5 %    BASOPHILS 0 0 - 2 %    IMMATURE GRANULOCYTES 1 (H) 0.0 - 0.5 %    ABS. NEUTROPHILS 12.8 (H) 1.8 - 8.0 K/UL    ABS. LYMPHOCYTES 2.4 0.9 - 3.6 K/UL    ABS. MONOCYTES 1.2 0.05 - 1.2 K/UL    ABS. EOSINOPHILS 0.1 0.0 - 0.4 K/UL    ABS. BASOPHILS 0.0 0.0 - 0.1 K/UL    ABS. IMM. GRANS. 0.1 (H) 0.00 - 0.04 K/UL    DF AUTOMATED     METABOLIC PANEL, COMPREHENSIVE    Collection Time: 08/01/22  2:14 PM   Result Value Ref Range    Sodium 140 136 - 145 mmol/L    Potassium 3.4 (L) 3.5 - 5.5 mmol/L    Chloride 103 100 - 111 mmol/L    CO2 31 21 - 32 mmol/L    Anion gap 6 3.0 - 18 mmol/L    Glucose 109 (H) 74 - 99 mg/dL    BUN 16 7.0 - 18 MG/DL    Creatinine 1.00 0.6 - 1.3 MG/DL    BUN/Creatinine ratio 16 12 - 20      GFR est AA >60 >60 ml/min/1.73m2    GFR est non-AA 55 (L) >60 ml/min/1.73m2    Calcium 9.3 8.5 - 10.1 MG/DL    Bilirubin, total 0.9 0.2 - 1.0 MG/DL    ALT (SGPT) 42 13 - 56 U/L    AST (SGOT) 36 10 - 38 U/L    Alk.  phosphatase 123 (H) 45 - 117 U/L    Protein, total 6.6 6.4 - 8.2 g/dL    Albumin 2.9 (L) 3.4 - 5.0 g/dL    Globulin 3.7 2.0 - 4.0 g/dL    A-G Ratio 0.8 0.8 - 1.7     NT-PRO BNP    Collection Time: 08/01/22  2:14 PM Result Value Ref Range    NT pro- 0 - 900 PG/ML   TROPONIN-HIGH SENSITIVITY    Collection Time: 08/01/22  2:14 PM   Result Value Ref Range    Troponin-High Sensitivity 9 0 - 54 ng/L   CK    Collection Time: 08/01/22  2:14 PM   Result Value Ref Range     (H) 26 - 192 U/L   POC LACTIC ACID    Collection Time: 08/01/22  2:23 PM   Result Value Ref Range    Lactic Acid (POC) 3.95 (HH) 0.40 - 2.00 mmol/L   EKG, 12 LEAD, INITIAL    Collection Time: 08/01/22  3:32 PM   Result Value Ref Range    Ventricular Rate 82 BPM    Atrial Rate 82 BPM    P-R Interval 172 ms    QRS Duration 84 ms    Q-T Interval 394 ms    QTC Calculation (Bezet) 460 ms    Calculated P Axis 46 degrees    Calculated R Axis 28 degrees    Calculated T Axis 42 degrees    Diagnosis       Normal sinus rhythm  Cannot rule out Anterior infarct , age undetermined  Abnormal ECG  When compared with ECG of 22-APR-2014 15:37,  No significant change was found         Radiologic Studies -   XR CHEST PORT   Final Result   No radiographic evidence of acute cardiopulmonary process. CT Results  (Last 48 hours)      None          CXR Results  (Last 48 hours)                 08/01/22 1402  XR CHEST PORT Final result    Impression:  No radiographic evidence of acute cardiopulmonary process. Narrative:  EXAM: XR CHEST PORT       CLINICAL INDICATION/HISTORY: 74 years Female. chest pain. Additional History: None       TECHNIQUE: Frontal view of the chest       COMPARISON: Chest radiograph 3/2/2020       FINDINGS:       The cardiac silhouette appears within normal limits. Tortuosity of the thoracic   aorta, unchanged in appearance. Pulmonary vasculature appears within normal   limits. No confluent airspace opacity is appreciated. No definite evidence of   pleural effusion or pneumothorax. No acute osseous abnormality appreciated. Medical Decision Making   I am the first provider for this patient.     I reviewed the vital signs, available nursing notes, past medical history, past surgical history, family history and social history. Vital Signs-Reviewed the patient's vital signs. Patient Vitals for the past 12 hrs:   Temp Pulse Resp BP SpO2   08/01/22 1829 -- 79 12 127/72 100 %   08/01/22 1759 -- 77 11 130/62 100 %   08/01/22 1729 -- 85 15 (!) 152/66 100 %   08/01/22 1659 -- 82 12 (!) 147/71 100 %   08/01/22 1629 -- 82 23 (!) 151/74 100 %   08/01/22 1559 -- 83 13 (!) 148/67 100 %   08/01/22 1529 -- 78 12 (!) 144/70 100 %   08/01/22 1329 99.1 °F (37.3 °C) (!) 110 18 (!) 143/95 99 %         EKG interpretation: (Preliminary)  Rhythm: normal sinus rhythm; and regular . Rate (approx.): 82; Axis: normal; IL interval: normal; QRS interval: normal ; ST/T wave: normal; Other findings: normal.    Records Reviewed: Nursing Notes, Old Medical Records, Previous electrocardiograms, Previous Radiology Studies, and Previous Laboratory Studies    Provider Notes (Medical Decision Making):   DDx: Sepsis, UTI, PNA, Infected wound    75 yo F who presents with wounds to her lower legs. On exam blisters present. +1 pitting edema. Normal BNP with no signs of fluid overload on CXR. Also purulent drainage and wound to back. Wound culture obtained. Elevated lactic and leukocytosis. No other source of infection including pneumonia or UTI. Patient admitted for further management. ED Course:   Initial assessment performed. The patients presenting problems have been discussed, and they are in agreement with the care plan formulated and outlined with them. I have encouraged them to ask questions as they arise throughout their visit.          Chart review:   Discharge dx on 7/33    Acute metabolic encephalopathy- improved / resolved  OPAL, hypernatremia, hypercalemia, mild polycythemia - all likely d/t severe dehydration  Deep tissue pressure injury of upper and lower back  Leukocytosis  Abnormal LFTs improving  Rhabdomyolysis  Oral thrush  Seizure disorder New brain mass suspected to be GBM-Per neurosurgery. Hypertension- Currently controlled without medications. Chronic pituitary adenoma  Functional quadriplegia          1745: Spoke with Dr Rashawn Madera, consult hospitalist.  Discussed patient's recent admission with presentation today with elevated leukocytosis and lactic acid. Discussed suspect source of infection is the low back wound. No UTI or PNA. Discussed Levaquin, vanc and Zosyn ordered. Discussed patient's recent mission. He agreed to a patient. Disposition:  Admitted    PLAN:  1. Current Discharge Medication List        2. Follow-up Information    None       Return to ED if worse     Diagnosis     Clinical Impression:   1. Sepsis, due to unspecified organism, unspecified whether acute organ dysfunction present ([facility] [address]    2. Open wound of lower back    3. Leukocytosis, unspecified type        Attestations:    SRINIVASAN Pierre    Please note that this dictation was completed with Stereotypes, the computer voice recognition software. Quite often unanticipated grammatical, syntax, homophones, and other interpretive errors are inadvertently transcribed by the computer software. Please disregard these errors. Please excuse any errors that have escaped final proofreading. Thank you.

## 2022-08-01 NOTE — ED TRIAGE NOTES
Pt presents with c/o blister to left foot - previously had blister pop on left ankle. Pt also reports skin reaction to back.

## 2022-08-02 PROBLEM — G89.3 CANCER ASSOCIATED PAIN: Status: RESOLVED | Noted: 2022-08-01 | Resolved: 2022-08-02

## 2022-08-02 PROBLEM — G89.3 CANCER ASSOCIATED PAIN: Chronic | Status: RESOLVED | Noted: 2022-08-01 | Resolved: 2022-08-02

## 2022-08-02 LAB
ALBUMIN SERPL-MCNC: 2.3 G/DL (ref 3.4–5)
ALBUMIN/GLOB SERPL: 0.6 {RATIO} (ref 0.8–1.7)
ALP SERPL-CCNC: 95 U/L (ref 45–117)
ALT SERPL-CCNC: 33 U/L (ref 13–56)
ANION GAP SERPL CALC-SCNC: 8 MMOL/L (ref 3–18)
AST SERPL-CCNC: 39 U/L (ref 10–38)
ATRIAL RATE: 82 BPM
BASOPHILS # BLD: 0 K/UL (ref 0–0.1)
BASOPHILS NFR BLD: 0 % (ref 0–2)
BILIRUB SERPL-MCNC: 1.1 MG/DL (ref 0.2–1)
BUN SERPL-MCNC: 16 MG/DL (ref 7–18)
BUN/CREAT SERPL: 16 (ref 12–20)
CALCIUM SERPL-MCNC: 8.6 MG/DL (ref 8.5–10.1)
CALCULATED P AXIS, ECG09: 46 DEGREES
CALCULATED R AXIS, ECG10: 28 DEGREES
CALCULATED T AXIS, ECG11: 42 DEGREES
CHLORIDE SERPL-SCNC: 108 MMOL/L (ref 100–111)
CO2 SERPL-SCNC: 28 MMOL/L (ref 21–32)
CREAT SERPL-MCNC: 1.01 MG/DL (ref 0.6–1.3)
DIAGNOSIS, 93000: NORMAL
DIFFERENTIAL METHOD BLD: ABNORMAL
EOSINOPHIL # BLD: 0.1 K/UL (ref 0–0.4)
EOSINOPHIL NFR BLD: 1 % (ref 0–5)
ERYTHROCYTE [DISTWIDTH] IN BLOOD BY AUTOMATED COUNT: 13.4 % (ref 11.6–14.5)
GLOBULIN SER CALC-MCNC: 3.9 G/DL (ref 2–4)
GLUCOSE SERPL-MCNC: 104 MG/DL (ref 74–99)
HCT VFR BLD AUTO: 38.8 % (ref 35–45)
HGB BLD-MCNC: 12.6 G/DL (ref 12–16)
IMM GRANULOCYTES # BLD AUTO: 0 K/UL (ref 0–0.04)
IMM GRANULOCYTES NFR BLD AUTO: 0 % (ref 0–0.5)
INR PPP: 1.1 (ref 0.8–1.2)
LYMPHOCYTES # BLD: 1.2 K/UL (ref 0.9–3.6)
LYMPHOCYTES NFR BLD: 15 % (ref 21–52)
MCH RBC QN AUTO: 30.1 PG (ref 24–34)
MCHC RBC AUTO-ENTMCNC: 32.5 G/DL (ref 31–37)
MCV RBC AUTO: 92.6 FL (ref 78–100)
MONOCYTES # BLD: 0.3 K/UL (ref 0.05–1.2)
MONOCYTES NFR BLD: 4 % (ref 3–10)
NEUTS SEG # BLD: 6.2 K/UL (ref 1.8–8)
NEUTS SEG NFR BLD: 80 % (ref 40–73)
NRBC # BLD: 0 K/UL (ref 0–0.01)
NRBC BLD-RTO: 0 PER 100 WBC
P-R INTERVAL, ECG05: 172 MS
PLATELET # BLD AUTO: 123 K/UL (ref 135–420)
PLATELET # BLD AUTO: ABNORMAL K/UL (ref 135–420)
PMV BLD AUTO: 10.8 FL (ref 9.2–11.8)
POTASSIUM SERPL-SCNC: 3.6 MMOL/L (ref 3.5–5.5)
PROT SERPL-MCNC: 6.2 G/DL (ref 6.4–8.2)
PROTHROMBIN TIME: 14.5 SEC (ref 11.5–15.2)
Q-T INTERVAL, ECG07: 394 MS
QRS DURATION, ECG06: 84 MS
QTC CALCULATION (BEZET), ECG08: 460 MS
RBC # BLD AUTO: 4.19 M/UL (ref 4.2–5.3)
SODIUM SERPL-SCNC: 144 MMOL/L (ref 136–145)
TSH SERPL DL<=0.05 MIU/L-ACNC: 1.63 UIU/ML (ref 0.36–3.74)
VENTRICULAR RATE, ECG03: 82 BPM
WBC # BLD AUTO: 7.8 K/UL (ref 4.6–13.2)

## 2022-08-02 PROCEDURE — 74011000258 HC RX REV CODE- 258: Performed by: EMERGENCY MEDICINE

## 2022-08-02 PROCEDURE — 74011250637 HC RX REV CODE- 250/637: Performed by: INTERNAL MEDICINE

## 2022-08-02 PROCEDURE — 97530 THERAPEUTIC ACTIVITIES: CPT

## 2022-08-02 PROCEDURE — 74011250636 HC RX REV CODE- 250/636: Performed by: EMERGENCY MEDICINE

## 2022-08-02 PROCEDURE — 80053 COMPREHEN METABOLIC PANEL: CPT

## 2022-08-02 PROCEDURE — 65270000046 HC RM TELEMETRY

## 2022-08-02 PROCEDURE — 97535 SELF CARE MNGMENT TRAINING: CPT

## 2022-08-02 PROCEDURE — 2709999900 HC NON-CHARGEABLE SUPPLY

## 2022-08-02 PROCEDURE — 99232 SBSQ HOSP IP/OBS MODERATE 35: CPT | Performed by: HOSPITALIST

## 2022-08-02 PROCEDURE — 77030041076 HC DRSG AG OPTICELL MDII -A

## 2022-08-02 PROCEDURE — 74011000250 HC RX REV CODE- 250: Performed by: INTERNAL MEDICINE

## 2022-08-02 PROCEDURE — 36415 COLL VENOUS BLD VENIPUNCTURE: CPT

## 2022-08-02 PROCEDURE — 77030038269 HC DRN EXT URIN PURWCK BARD -A

## 2022-08-02 PROCEDURE — 77030040393 HC DRSG OPTIFOAM GENT MDII -B

## 2022-08-02 PROCEDURE — 74011250636 HC RX REV CODE- 250/636: Performed by: INTERNAL MEDICINE

## 2022-08-02 PROCEDURE — 85025 COMPLETE CBC W/AUTO DIFF WBC: CPT

## 2022-08-02 PROCEDURE — 85610 PROTHROMBIN TIME: CPT

## 2022-08-02 PROCEDURE — 97162 PT EVAL MOD COMPLEX 30 MIN: CPT

## 2022-08-02 PROCEDURE — 84443 ASSAY THYROID STIM HORMONE: CPT

## 2022-08-02 PROCEDURE — 85049 AUTOMATED PLATELET COUNT: CPT

## 2022-08-02 PROCEDURE — 74011250636 HC RX REV CODE- 250/636: Performed by: PHYSICIAN ASSISTANT

## 2022-08-02 PROCEDURE — 97165 OT EVAL LOW COMPLEX 30 MIN: CPT

## 2022-08-02 RX ADMIN — SODIUM CHLORIDE, PRESERVATIVE FREE 10 ML: 5 INJECTION INTRAVENOUS at 21:17

## 2022-08-02 RX ADMIN — DEXAMETHASONE 2 MG: 2 TABLET ORAL at 21:17

## 2022-08-02 RX ADMIN — GABAPENTIN 300 MG: 300 CAPSULE ORAL at 17:00

## 2022-08-02 RX ADMIN — PYRIDOXINE HCL TAB 50 MG 200 MG: 50 TAB at 08:45

## 2022-08-02 RX ADMIN — PIPERACILLIN AND TAZOBACTAM 3.38 G: 3; .375 INJECTION, POWDER, FOR SOLUTION INTRAVENOUS at 08:55

## 2022-08-02 RX ADMIN — HYDRALAZINE HYDROCHLORIDE 25 MG: 50 TABLET, FILM COATED ORAL at 08:44

## 2022-08-02 RX ADMIN — PIPERACILLIN AND TAZOBACTAM 3.38 G: 3; .375 INJECTION, POWDER, FOR SOLUTION INTRAVENOUS at 00:00

## 2022-08-02 RX ADMIN — ACETAMINOPHEN 650 MG: 325 TABLET, FILM COATED ORAL at 17:00

## 2022-08-02 RX ADMIN — AMLODIPINE BESYLATE 10 MG: 10 TABLET ORAL at 08:46

## 2022-08-02 RX ADMIN — PIPERACILLIN AND TAZOBACTAM 3.38 G: 3; .375 INJECTION, POWDER, FOR SOLUTION INTRAVENOUS at 21:17

## 2022-08-02 RX ADMIN — Medication 2500 MCG: at 08:46

## 2022-08-02 RX ADMIN — DIVALPROEX SODIUM 500 MG: 500 TABLET, EXTENDED RELEASE ORAL at 01:22

## 2022-08-02 RX ADMIN — GABAPENTIN 300 MG: 300 CAPSULE ORAL at 08:46

## 2022-08-02 RX ADMIN — HYDRALAZINE HYDROCHLORIDE 25 MG: 50 TABLET, FILM COATED ORAL at 21:17

## 2022-08-02 RX ADMIN — LEVOFLOXACIN 750 MG: 5 INJECTION, SOLUTION INTRAVENOUS at 15:07

## 2022-08-02 RX ADMIN — SODIUM CHLORIDE, PRESERVATIVE FREE 5 ML: 5 INJECTION INTRAVENOUS at 14:50

## 2022-08-02 RX ADMIN — VANCOMYCIN HYDROCHLORIDE 1250 MG: 10 INJECTION, POWDER, LYOPHILIZED, FOR SOLUTION INTRAVENOUS at 06:24

## 2022-08-02 RX ADMIN — GABAPENTIN 300 MG: 300 CAPSULE ORAL at 01:22

## 2022-08-02 RX ADMIN — PIPERACILLIN AND TAZOBACTAM 3.38 G: 3; .375 INJECTION, POWDER, FOR SOLUTION INTRAVENOUS at 13:45

## 2022-08-02 RX ADMIN — HYDRALAZINE HYDROCHLORIDE 25 MG: 50 TABLET, FILM COATED ORAL at 15:07

## 2022-08-02 RX ADMIN — DEXAMETHASONE 2 MG: 2 TABLET ORAL at 01:23

## 2022-08-02 RX ADMIN — LEVOTHYROXINE SODIUM 88 MCG: 88 TABLET ORAL at 07:55

## 2022-08-02 RX ADMIN — SODIUM CHLORIDE, PRESERVATIVE FREE 10 ML: 5 INJECTION INTRAVENOUS at 01:23

## 2022-08-02 RX ADMIN — DEXAMETHASONE 2 MG: 2 TABLET ORAL at 08:45

## 2022-08-02 RX ADMIN — ATENOLOL 100 MG: 50 TABLET ORAL at 08:44

## 2022-08-02 RX ADMIN — SODIUM CHLORIDE, PRESERVATIVE FREE 10 ML: 5 INJECTION INTRAVENOUS at 06:27

## 2022-08-02 NOTE — PROGRESS NOTES
The CM spoke with the patient in concern to the recommendation of SNF for the patient. Yet, the patient refused the option of SNF stating \" I have a friend that live in [place], and a friend that lives in [place] that can be here in a moments notice\". The CM attempted a person-centered approach by indicating that the admit to a SNF was to aid in your rehab to better maintain and promote as much independence as possible, but the patient insisted \"I will do my rehab at home\".     Azalia Voss, MSW, HP

## 2022-08-02 NOTE — PROGRESS NOTES
Notified MD THAT PT HAS A POSITIVE BLOOD CULTURE WITH GRAM NOTIVE RODS AND MADE AWARE. NO NEW ORDERS RECEIVED.

## 2022-08-02 NOTE — ED NOTES
TRANSFER - OUT REPORT:    Verbal report given to Jeffery Gonzalez RN(name) on Albin Jimenez  being transferred to [facility] 4S(unit) for routine progression of care       Report consisted of patients Situation, Background, Assessment and   Recommendations(SBAR). Information from the following report(s) SBAR was reviewed with the receiving nurse. Lines:   Peripheral IV 08/01/22 Left Antecubital (Active)   Site Assessment Clean, dry, & intact 08/01/22 1433   Phlebitis Assessment 0 08/01/22 1433   Infiltration Assessment 0 08/01/22 1433   Dressing Status Clean, dry, & intact 08/01/22 1433   Dressing Type Transparent 08/01/22 1433   Hub Color/Line Status Pink;Patent; Flushed 08/01/22 1433       Peripheral IV 08/01/22 Right Antecubital (Active)        Opportunity for questions and clarification was provided.       Patient transported with:   Monitor

## 2022-08-02 NOTE — PROGRESS NOTES
Reason for Admission:  Sepsis ([facility] [address] [A41.9]  Leukocytosis [D72.829]  Open wound of lower back [S31.000A]                 RUR Score: 10%           Plan for utilizing home health:  Occupational Therapy recommends SNF, but the patient informed CM they prefer home-health. Likelihood of Readmission:   LOW                         Transition of Care Plan:              Initial assessment completed with patient. Cognitive status of patient: oriented to time, place, person and situation. Face sheet information confirmed:  yes. This patient lives alone in a one-story single family home. Patient is not able to navigate steps as needed. Prior to hospitalization, patient was considered to be independent with ADLs/IADLS : no . If not independent,  patient needs assist with : food preparation and cooking    Patient has a current ACP document on file: no      Healthcare Decision Maker:     Click here to complete Intelligize including selection of the Healthcare Decision Maker Relationship (ie \"Primary\")    The brother will be available to transport patient home upon discharge. The patient already has [employer] medical equipment available in the home. Patient is not currently active with home health. Patient has not stayed in a skilled nursing facility or rehab. Was  stay within last 60 days : no. This patient is on dialysis :no    Freedom of choice signed: no. Currently, the discharge plan is Home with Home Health. The patient states that she can obtain her medications from the pharmacy, and take her medications as directed. Patient's current insurance is [employer] Medicare Part A & B / [address] [place] [address] Management Interventions  PCP Verified by CM: Yes  Palliative Care Criteria Met (RRAT>21 & CHF Dx)?: No  Mode of Transport at Discharge:  Other (see comment) (Brother)  Transition of Care Consult (CM Consult): Discharge Planning  MyChart Signup: No  Discharge Durable Medical Equipment: No  Physical Therapy Consult: No  Occupational Therapy Consult: Yes  Speech Therapy Consult: No  Support Systems: Other Family Member(s)  Discharge Location  Patient Expects to be Discharged to[de-identified] Home with home health    ALONA Pete, RICHMONDHP

## 2022-08-02 NOTE — PROGRESS NOTES
conducted an initial consultation and Spiritual Assessment for Derek Miguel, who is a 74 y.o.,female. Patients Primary Language is: [place]. According to the patients EMR Yazdanism Affiliation is: No preference. The reason the Patient came to the hospital is:   Patient Active Problem List    Diagnosis Date Noted    Severe sepsis ([facility] [address] 08/01/2022    Open wound of lower back 08/01/2022    Wound of left leg 08/01/2022    Glaucoma 08/01/2022    Primary hypertension 08/01/2022    Acquired hypothyroidism 08/01/2022    Obesity (BMI 30.0-34.9) 08/01/2022    Impaired mobility and ADLs 08/01/2022    Severe obesity ([facility] [address] 04/16/2019    History of breast cancer 05/16/2016        The  provided the following Interventions:  Initiated a relationship of care and support with patient in room 414 this morning and found her lying in the bed with blankets pulled up tight to her neck. Patient had just finished breakfast.    Listened empathically as she talked about her being here and who her favorite television preacher was. She also shared a bit of her testimony and belief in how God was going to heal her soon. Patient is here due to sepsis and open wounds on her back. There is no advance directive present. Provided information about Spiritual Care Services. Offered prayer on patients behalf. The following outcomes were achieved:  Patient shared limited information about her medical narrative and spiritual beliefs. Patient processed feeling about current hospitalization. Patient expressed gratitude for pastoral care visit. Assessment:  Patient does not have any Hinduism/cultural needs that will affect patients preferences in health care. There are no further spiritual or Hinduism issues which require Spiritual Care Services interventions at this time. Plan:  Chaplains will continue to follow and will provide pastoral care on an as needed/requested basis    . Argelia Dalton Board Certified [address] [place] [address]   [phone]

## 2022-08-02 NOTE — PROGRESS NOTES
Problem: Self Care Deficits Care Plan (Adult)  Goal: *Acute Goals and Plan of Care (Insert Text)  Description: Occupational Therapy Goals  Initiated 8/2/2022 within 7 day(s). 1.  Patient will perform bed mobility in preparation for self-care with minimal assistance/contact guard assist x 1, bed simulated to home environment . 2.  Patient will perform upper body dressing with supervision/set-up. 3.  Patient will perform functional activity sitting EOB with modified independence > 5 minutes, G balance. 4.  Patient will perform toilet transfers with minimal assistance/contact guard assist.  5.  Patient will perform all aspects of toileting with minimal assistance/contact guard assist.  6.  Patient will participate in upper extremity therapeutic exercise/activities with supervision/set-up for 8 minutes. 7.  Patient will utilize energy conservation techniques during functional activities with verbal cues. Prior Level of Function: Patient reports she needed assist with self-care prn from her brothers and used a rollator for functional mobility PTA. Outcome: Progressing Towards Goal       OCCUPATIONAL THERAPY EVALUATION    Patient: Idalia Wagner (45 y.o. female)  Date: 8/2/2022  Primary Diagnosis: Sepsis ([address] [A41.9]  Leukocytosis [D72.829]  Open wound of lower back [S31.000A]  Precautions: Fall, Skin      ASSESSMENT :  Upon entering the room, the patient was supine in bed, alert, and agreeable to participate in OT evaluation. Patient requesting OT hand her cup from table and meds on meal tray, pt stand by assist for self-feeding. PLOF obtained from patient, PT joined into session following to maximize patient safety, participation, and functional mobility in preparation for self-care tasks. Patient with additional time for all tasks this session and required maximal encouragement. Patient with self-limiting behaviors observed, minimal effort at times for task completion.  Patient moderate - maximal assist x 2 for bed mobility and able to sit EOB for approx. 10 minutes, F balance. Cues needed at first for upright posture. Patient stand by assist for grooming task, IV observed infiltrated and RN notified and present to assess. Based on the objective data described below, the patient presents with decreased strength, decreased independence, decreased activity tolerance, decreased functional balance, and decreased functional mobility, which impedes pt performance in basic self-care/ADL tasks. Patient would benefit from skilled OT to restore PLOF and maximize function. Patient will benefit from skilled intervention to address the above impairments. Patient's rehabilitation potential is considered to be Fair  Factors which may influence rehabilitation potential include:   []             None noted  [x]             Mental ability/status  [x]             Medical condition  [x]             Home/family situation and support systems  [x]             Safety awareness  [x]             Pain tolerance/management  []             Other:      PLAN :  Recommendations and Planned Interventions:   [x]               Self Care Training                  [x]      Therapeutic Activities  [x]               Functional Mobility Training   []      Cognitive Retraining  [x]               Therapeutic Exercises           [x]      Endurance Activities  [x]               Balance Training                    [x]      Neuromuscular Re-Education  []               Visual/Perceptual Training     [x]      Home Safety Training  [x]               Patient Education                   [x]      Family Training/Education  []               Other (comment):    Frequency/Duration: Patient will be followed by occupational therapy 3 - 5 times a week to address goals.     Further Equipment Recommendations for Discharge: bedside commode, hospital bed, mechanical lift, transfer bench, and wheelchair     AMPAC: Current research shows that based on an AM-PAC score of 15/24 and their current ADL deficits; it is recommended that the patient have 3-5 sessions per week of Occupational Therapy at d/c to increase the patient's independence. This [facility] score should be considered in conjunction with interdisciplinary team recommendations to determine the most appropriate discharge setting. Patient's social support, diagnosis, medical stability, and prior level of function should also be taken into consideration. SUBJECTIVE:   Patient stated Tyner Michell going to need a wheelchair dont you think? 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 [address] 01/01/2014    breast    Ductal carcinoma in situ of breast     Functional quadriplegia (HCC)     as of 7/2022    Glaucoma     History of seizure disorder     Open back wound 07/2022    Pressure wound for lying on a table for indeterminate amount of time    Pituitary adenoma with extrasellar extension ([facility] [address]     Primary hypertension      Past Surgical History:   Procedure Laterality Date    HX HYSTERECTOMY      partial    HX MASTECTOMY  4/30/2014    RIGHT PARTIAL MASTECTOMY WITH NEEDLE LOCALIZATION MAMMOGRAM SENTINEL LYMPH NODE BIOPSY performed by Farida Taylor MD at [facility] MAIN OR     Barriers to Learning/Limitations: None  Compensate with: visual, verbal, tactile, kinesthetic cues/model    Home Situation:   Home Situation  Home Environment: Private residence  # Steps to Enter: 0  One/Two Story Residence: One story  Living Alone: Yes  Support Systems: Other Family Member(s)  Patient Expects to be Discharged to[de-identified] Home  Current DME Used/Available at Home: [place] [employer], quad, Walker, rollator, Walker, rolling  Tub or Shower Type: Tub/Shower combination  [x]  Right hand dominant   []  Left hand dominant    Cognitive/Behavioral Status:  Neurologic State: Alert  Orientation Level: Oriented to person;Oriented to place;Oriented to situation  Cognition: Follows commands;Decreased attention/concentration  Safety/Judgement: Fall prevention;Decreased insight into deficits    Skin: Intact  Edema: None noted    Vision/Perceptual:    Tracking: Requires cues, head turns, or add eye shifts to track        Coordination: BUE     Fine Motor Skills-Upper: Left Intact; Right Intact    Gross Motor Skills-Upper: Left Intact; Right Intact    Balance:  Sitting: Impaired; Without support  Sitting - Static: Fair (occasional)  Sitting - Dynamic: Fair (occasional)    Strength: BUE  Strength: Generally decreased, functional    Tone & Sensation: BUE  Tone: Normal  Sensation: Intact       Range of Motion: BUE  AROM: Generally decreased, functional      Functional Mobility and Transfers for ADLs:  Bed Mobility:  Supine to Sit: Moderate assistance;Assist x2; Additional time  Sit to Supine: Maximum assistance;Assist x1;Additional time (assist for BLE into bed anf Belia for trunk; pt gave minimal effort)  Scooting: Moderate assistance (towards EOB)  Transfers:    Sit to Stand:  (pt declined)    ADL Assessment:   Feeding: Setup    Oral Facial Hygiene/Grooming: Setup;Stand-by assistance    Bathing: Maximum assistance    Upper Body Dressing: Moderate assistance    Lower Body Dressing: Maximum assistance; Total assistance    Toileting: Maximum assistance; Total assistance      ADL Intervention:  Feeding  Feeding Assistance: Set-up; Stand-by assistance  Food to Mouth: Stand-by assistance  Drink to Mouth: Stand-by assistance    Grooming  Grooming Assistance: Stand-by assistance  Washing Face: Stand-by assistance      Cognitive Retraining  Safety/Judgement: Fall prevention;Decreased insight into deficits    Pain:  Pain level pre-treatment: 0/10   Pain level post-treatment: 0/10   Pain Intervention(s): Medication (see MAR);  Rest, Ice, Repositioning   Response to intervention: Nurse notified, See doc flow    Activity Tolerance:   Patient demonstrated decreased activity tolerance during OT session. Please refer to the flowsheet for vital signs taken during this treatment. After treatment:   [] Patient left in no apparent distress sitting up in chair  [x] Patient left in no apparent distress in bed  [x] Call bell left within reach  [x] Nursing notified  [] Caregiver present  [] Bed alarm activated    COMMUNICATION/EDUCATION:   [x] Role of Occupational Therapy in the acute care setting  [x] Home safety education was provided and the patient/caregiver indicated understanding. [x] Patient/family have participated as able in goal setting and plan of care. [x] Patient/family agree to work toward stated goals and plan of care. [] Patient understands intent and goals of therapy, but is neutral about his/her participation. [] Patient is unable to participate in goal setting and plan of care. Thank you for this referral.  Elia Garza OTR/L  Time Calculation: 50 mins    Eval Complexity: History: MEDIUM Complexity : Expanded review of history including physical, cognitive and psychosocial  history ; Examination: HIGH Complexity : 5 or more performance deficits relating to physical, cognitive , or psychosocial skils that result in activity limitations and / or participation restrictions; Decision Making:HIGH Complexity : Patient presents with comorbidities that affect occupational performance.  Signifigant modification of tasks or assistance (eg, physical or verbal) with assessment (s) is necessary to enable patient to complete evaluation     [facility] AM-PAC® Daily Activity Inpatient Short Form (6-Clicks)*    How much HELP from another person does the patient currently need    (If the patient hasn't done an activity recently, how much help from another person do you think he/she would need if he/she tried?)   Total (Total A or Dep)   A Lot  (Mod to Max A)   A Little (Sup or Min A)   None (Mod I to I)   Putting on and taking off regular lower body clothing? [] 1 [x] 2 [] 3 [] 4   2. Bathing (including washing, rinsing,      drying)? [] 1 [x] 2 [] 3 [] 4   3. Toileting, which includes using toilet, bedpan or urinal?   [] 1 [x] 2 [] 3 [] 4   4. Putting on and taking off regular upper body clothing? [] 1 [] 2 [x] 3 [] 4   5. Taking care of personal grooming such as brushing teeth? [] 1 [] 2 [x] 3 [] 4   6. Eating meals?    [] 1 [] 2 [x] 3 [] 4

## 2022-08-02 NOTE — PROGRESS NOTES
[facility] Pharmacokinetic Monitoring Service - Vancomycin     Guille Cerda is a 74 y.o. female starting on vancomycin therapy for Sepsis of Unknown Etiology. Pharmacy consulted by SRINIVASAN Heller for monitoring and adjustment. Target Concentration: Goal AUC/CORBY 400-600 mg*hr/L    Additional Antimicrobials: Piperacillin/Tazobactam, Levaquin    Pertinent Laboratory Values:   Temp: 98.1 °F (36.7 °C)  Weight: 78.6 kg (173 lb 4.8 oz)  Recent Labs     08/01/22  1414   CREA 1.00   BUN 16   WBC 16.5*     Estimated Creatinine Clearance: 53.5 mL/min (based on SCr of 1 mg/dL). Pertinent Cultures:  Culture Date Source Results   8/1/22 Blood  Pending   8/1/22 Wound  Pending   MRSA Nasal Swab: N/A. Non-respiratory infection    Plan:  Dosing recommendations based on Bayesian software  Pt was given Vancomycin 1000 mg x 1 at ED. Will follow with maintenance dose of Vancomycin 1250 mg q24h. Anticipated AUC of 447 and trough concentration of 12.9 at steady state  Renal labs as indicated   No level ordered at this time   Pharmacy will continue to monitor patient and adjust therapy as indicated    Thank you for the consult,  Mami Broussard.  ANASTASIYA Maurer  8/1/2022

## 2022-08-02 NOTE — WOUND CARE
Physical Exam  Room 414: pt not seen at this time, pt currently working with OT therapist.  Freda MEYER, RN, [facility], [address]

## 2022-08-02 NOTE — PROGRESS NOTES
[facility]ist [facility]  Progress Note    Patient: Ras Antonio Age: 74 y.o. : 1955 MR#: [id number] SSN: [id number]  Date/Time: 2022     Subjective:     Patient seen and evaluated, lying in bed, no acute distress. 78-year-old female presents with complaints of left foot blister and left ankle pain. Patient reports a blister on her left ankle which ruptured and drained serous fluid with a large blister on her left foot currently. Patient also has chronic wounds on her lower back. Patient was recently diagnosed with a brain mass for which she was supposed to undergo a brain mass biopsy for possible glioblastoma however it appears that she has not undergone that procedure yet. Assessment/Plan:     Left foot blister, left ankle pain with chronic wounds on her lower back-continue broad-spectrum IV antibiotics, appreciate wound care input. ID consultation. Bacteremia-continue IV antibiotics, will defer duration to infectious disease. Brain mass-patient currently has a neurosurgical appointment, was supposed to undergo biopsy for possible glioblastoma however it is currently pending. Continue dexamethasone 2 mg twice daily.   History of hypertension-resume home medications, monitor blood pressure  History of hypothyroidism-continue Synthroid  History of seizure disorder-continue gabapentin and Depakote  History of breast cancer-continue tamoxifen  DVT prophylaxis-Lovenox  Full code                Dhara Barr MD  22      Case discussed with:  [x]Patient  []Family  []Nursing  []Case Management  DVT Prophylaxis:  [x]Lovenox  []Hep SQ  []SCDs  []Coumadin   []On Heparin gtt    Objective:   VS: Visit Vitals  /78   Pulse 76   Temp 97.4 °F (36.3 °C)   Resp 16   Ht 5' 3\" (1.6 m)   Wt 78.6 kg (173 lb 4.8 oz)   SpO2 100%   BMI 30.70 kg/m²      Tmax/24hrs: Temp (24hrs), Av.9 °F (36.6 °C), Min:97.4 °F (36.3 °C), Max:98.4 °F (36.9 °C)  IOBRIEF  Intake/Output Summary (Last 24 hours) at 8/2/2022 1527  Last data filed at 8/2/2022 0830  Gross per 24 hour   Intake --   Output 850 ml   Net -850 ml       General:  Alert, cooperative, no acute distress, blind in right eye  Pulmonary:  CTA Bilaterally. No Wheezing/Rhonchi/Rales. Cardiovascular: Regular rate and Rhythm. GI:  Soft, Non distended, Non tender. + Bowel sounds. Extremities: Bilateral lower extremity edema with open wounds  Neurologic: Alert and oriented X 3. No acute neuro deficits.   Additional:    Medications:   Current Facility-Administered Medications   Medication Dose Route Frequency    sodium chloride (NS) flush 5-10 mL  5-10 mL IntraVENous PRN    levoFLOXacin (LEVAQUIN) 750 mg in D5W IVPB  750 mg IntraVENous Q24H    piperacillin-tazobactam (ZOSYN) 3.375 g in 0.9% sodium chloride (MBP/ADV) 100 mL MBP  3.375 g IntraVENous Q8H    sodium chloride (NS) flush 5-40 mL  5-40 mL IntraVENous Q8H    sodium chloride (NS) flush 5-40 mL  5-40 mL IntraVENous PRN    acetaminophen (TYLENOL) tablet 650 mg  650 mg Oral Q6H PRN    Or    acetaminophen (TYLENOL) suppository 650 mg  650 mg Rectal Q6H PRN    polyethylene glycol (MIRALAX) packet 17 g  17 g Oral DAILY PRN    ondansetron (ZOFRAN ODT) tablet 4 mg  4 mg Oral Q8H PRN    Or    ondansetron (ZOFRAN) injection 4 mg  4 mg IntraVENous Q6H PRN    albuterol-ipratropium (DUO-NEB) 2.5 MG-0.5 MG/3 ML  3 mL Nebulization Q6H PRN    melatonin tablet 5 mg  5 mg Oral QHS PRN    nitroglycerin (NITROBID) 2 % ointment 0.5 Inch  0.5 Inch Topical Q6H PRN    naloxone (NARCAN) injection 0.4 mg  0.4 mg IntraVENous EVERY 2 MINUTES AS NEEDED    vancomycin (VANCOCIN) 1250 mg in  ml infusion  1,250 mg IntraVENous Q24H    amLODIPine (NORVASC) tablet 10 mg  10 mg Oral DAILY    cyanocobalamin (VITAMIN B12) sublingual tablet 2,500 mcg  2,500 mcg Oral DAILY    dexAMETHasone (DECADRON) tablet 2 mg  2 mg Oral Q12H    divalproex ER (DEPAKOTE ER) 24 hour tablet 500 mg  500 mg Oral DAILY    gabapentin (NEURONTIN) capsule 300 mg  300 mg Oral BID    hydrALAZINE (APRESOLINE) tablet 25 mg  25 mg Oral TID    levothyroxine (SYNTHROID) tablet 88 mcg  88 mcg Oral 6am    atenoloL (TENORMIN) tablet 100 mg  100 mg Oral DAILY    oxyCODONE-acetaminophen (PERCOCET) 5-325 mg per tablet 1 Tablet  1 Tablet Oral Q4H PRN    pyridoxine (vitamin B6) (VITAMIN B-6) tablet 200 mg  200 mg Oral DAILY       Imaging:   XR Results (most recent):  Results from Hospital Encounter encounter on 08/01/22    XR CHEST PORT    Narrative  EXAM: XR CHEST PORT    CLINICAL INDICATION/HISTORY: 74 years Female. chest pain. Additional History: None    TECHNIQUE: Frontal view of the chest    COMPARISON: Chest radiograph 3/2/2020    FINDINGS:    The cardiac silhouette appears within normal limits. Tortuosity of the thoracic  aorta, unchanged in appearance. Pulmonary vasculature appears within normal  limits. No confluent airspace opacity is appreciated. No definite evidence of  pleural effusion or pneumothorax. No acute osseous abnormality appreciated. Impression  No radiographic evidence of acute cardiopulmonary process. CT Results (most recent):  No results found for this or any previous visit. MRI Results (most recent):  Results from [place] encounter on 03/31/14    MRI BREAST BX VAC ASSIST RT    Addendum 4/3/2014  4:54 PM  Addendum: Pathology changes from right breast biopsy 12:00 position-  Fibrocystic changes with florid ductal epithelial hyperplasia. The findings are benign and concordant. Narrative  MR guided left breast biopsy    HISTORY: Left breast cancer, abnormal MRI    COMPARISON: MRI March 2014    FINDINGS:    Informed consent was obtained. The risks of the procedure including but not  limited to bleeding and infection were reviewed with the patient who wished to  proceed. Preliminary pre and post contrast images were performed. 20cc  Magnevist IV was administered.     With the patient positioned prone, and following placement in the compression  device, the left breast was imaged, and the lesion in the 12:00 left breast was  targeted. Appropriate coordinates records were obtained. The skin was  cleansed and local anesthesia administered. After making a small incision in  the skin with a # 11 scalpel blade, the trocar and biopsy sheath were advanced  to the appropriate depth. After confirmation of appropriate positioning the 9  gauge 20 mm vacuum assisted biopsy needle was advanced to the appropriate  depth. Subsequently a series of approximately 12 vacuum assisted samples were  acquired in a radial fashion. Prior to removal of the sheath, using coaxial technique, a adBrite  cylinder shaped clip was placed to oscar area sampled. The patient was removed from the biopsy device, the wound was dressed and post  biopsy instructions were given to the patient. The samples were sent to the  laboratory for analysis. Diagnostic Mammogram left Breast: CC and MLO views were obtained of the left  breast and document the clip at the site of sampling in the 12:00 left breast.    Complications: No complications. Specimen: 12 vacuum assisted samples  Assistant: None. EBL: 5 cc    Impression  :    Status post MRI guided biopsy of an abnormal area of enhancement in the 12:00  left breast.  Area sampled marked with a     clip. Samples sent to the  laboratory for analysis.         Labs:    Recent Results (from the past 48 hour(s))   URINALYSIS W/ RFLX MICROSCOPIC    Collection Time: 08/01/22  2:00 PM   Result Value Ref Range    Color DARK YELLOW      Appearance CLEAR      Specific gravity 1.019 1.005 - 1.030      pH (UA) 5.5 5.0 - 8.0      Protein Negative NEG mg/dL    Glucose Negative NEG mg/dL    Ketone Negative NEG mg/dL    Bilirubin SMALL (A) NEG      Blood Negative NEG      Urobilinogen 1.0 0.2 - 1.0 EU/dL    Nitrites Negative NEG      Leukocyte Esterase TRACE (A) NEG     URINE MICROSCOPIC ONLY    Collection Time: 08/01/22  2:00 PM   Result Value Ref Range    WBC 0 to 3 0 - 4 /hpf    RBC NONE 0 - 5 /hpf    Epithelial cells FEW 0 - 5 /lpf    Bacteria FEW (A) NEG /hpf   CBC WITH AUTOMATED DIFF    Collection Time: 08/01/22  2:14 PM   Result Value Ref Range    WBC 16.5 (H) 4.6 - 13.2 K/uL    RBC 4.06 (L) 4.20 - 5.30 M/uL    HGB 12.2 12.0 - 16.0 g/dL    HCT 36.5 35.0 - 45.0 %    MCV 89.9 78.0 - 100.0 FL    MCH 30.0 24.0 - 34.0 PG    MCHC 33.4 31.0 - 37.0 g/dL    RDW 13.2 11.6 - 14.5 %    PLATELET 577 524 - 406 K/uL    MPV 11.2 9.2 - 11.8 FL    NRBC 0.0 0  WBC    ABSOLUTE NRBC 0.00 0.00 - 0.01 K/uL    NEUTROPHILS 78 (H) 40 - 73 %    LYMPHOCYTES 14 (L) 21 - 52 %    MONOCYTES 7 3 - 10 %    EOSINOPHILS 0 0 - 5 %    BASOPHILS 0 0 - 2 %    IMMATURE GRANULOCYTES 1 (H) 0.0 - 0.5 %    ABS. NEUTROPHILS 12.8 (H) 1.8 - 8.0 K/UL    ABS. LYMPHOCYTES 2.4 0.9 - 3.6 K/UL    ABS. MONOCYTES 1.2 0.05 - 1.2 K/UL    ABS. EOSINOPHILS 0.1 0.0 - 0.4 K/UL    ABS. BASOPHILS 0.0 0.0 - 0.1 K/UL    ABS. IMM. GRANS. 0.1 (H) 0.00 - 0.04 K/UL    DF AUTOMATED     METABOLIC PANEL, COMPREHENSIVE    Collection Time: 08/01/22  2:14 PM   Result Value Ref Range    Sodium 140 136 - 145 mmol/L    Potassium 3.4 (L) 3.5 - 5.5 mmol/L    Chloride 103 100 - 111 mmol/L    CO2 31 21 - 32 mmol/L    Anion gap 6 3.0 - 18 mmol/L    Glucose 109 (H) 74 - 99 mg/dL    BUN 16 7.0 - 18 MG/DL    Creatinine 1.00 0.6 - 1.3 MG/DL    BUN/Creatinine ratio 16 12 - 20      GFR est AA >60 >60 ml/min/1.73m2    GFR est non-AA 55 (L) >60 ml/min/1.73m2    Calcium 9.3 8.5 - 10.1 MG/DL    Bilirubin, total 0.9 0.2 - 1.0 MG/DL    ALT (SGPT) 42 13 - 56 U/L    AST (SGOT) 36 10 - 38 U/L    Alk.  phosphatase 123 (H) 45 - 117 U/L    Protein, total 6.6 6.4 - 8.2 g/dL    Albumin 2.9 (L) 3.4 - 5.0 g/dL    Globulin 3.7 2.0 - 4.0 g/dL    A-G Ratio 0.8 0.8 - 1.7     NT-PRO BNP    Collection Time: 08/01/22  2:14 PM   Result Value Ref Range    NT pro- 0 - 900 PG/ML   TROPONIN-HIGH SENSITIVITY Collection Time: 08/01/22  2:14 PM   Result Value Ref Range    Troponin-High Sensitivity 9 0 - 54 ng/L   CK    Collection Time: 08/01/22  2:14 PM   Result Value Ref Range     (H) 26 - 192 U/L   CULTURE, BLOOD    Collection Time: 08/01/22  2:14 PM    Specimen: Blood   Result Value Ref Range    Special Requests: NO SPECIAL REQUESTS      Culture result: NO GROWTH AFTER 14 HOURS     MAGNESIUM    Collection Time: 08/01/22  2:14 PM   Result Value Ref Range    Magnesium 2.2 1.6 - 2.6 mg/dL   POC LACTIC ACID    Collection Time: 08/01/22  2:23 PM   Result Value Ref Range    Lactic Acid (POC) 3.95 (HH) 0.40 - 2.00 mmol/L   CULTURE, BLOOD    Collection Time: 08/01/22  2:30 PM    Specimen: Blood   Result Value Ref Range    Special Requests: NO SPECIAL REQUESTS      GRAM STAIN ANAEROBIC BOTTLE GRAM NEGATIVE RODS      GRAM STAIN        SMEAR CALLED TO AND CORRECTLY REPEATED BY: SAILAJA HARRY RN, 4S, 7832 8/2/22 TO KENNETH    Culture result: CULTURE IN PROGRESS,FURTHER UPDATES TO FOLLOW      Culture result:        Sent to [facility] for ID/Susceptibility if indicated. EKG, 12 LEAD, INITIAL    Collection Time: 08/01/22  3:32 PM   Result Value Ref Range    Ventricular Rate 82 BPM    Atrial Rate 82 BPM    P-R Interval 172 ms    QRS Duration 84 ms    Q-T Interval 394 ms    QTC Calculation (Bezet) 460 ms    Calculated P Axis 46 degrees    Calculated R Axis 28 degrees    Calculated T Axis 42 degrees    Diagnosis       Normal sinus rhythm  Cannot rule out Anterior infarct , age undetermined  Abnormal ECG  When compared with ECG of 22-APR-2014 15:37,  No significant change was found     CULTURE, WOUND Lester Parker STAIN    Collection Time: 08/01/22  5:31 PM    Specimen: Skin;  Wound   Result Value Ref Range    Special Requests: NO SPECIAL REQUESTS      GRAM STAIN 3+ GRAM POSITIVE COCCI IN CLUSTERS      GRAM STAIN 2+ GRAM POSITIVE RODS (CORYNEFORM)      GRAM STAIN 3+ GRAM NEGATIVE RODS      Culture result: PENDING    METABOLIC PANEL, COMPREHENSIVE    Collection Time: 08/02/22  2:59 AM   Result Value Ref Range    Sodium 144 136 - 145 mmol/L    Potassium 3.6 3.5 - 5.5 mmol/L    Chloride 108 100 - 111 mmol/L    CO2 28 21 - 32 mmol/L    Anion gap 8 3.0 - 18 mmol/L    Glucose 104 (H) 74 - 99 mg/dL    BUN 16 7.0 - 18 MG/DL    Creatinine 1.01 0.6 - 1.3 MG/DL    BUN/Creatinine ratio 16 12 - 20      GFR est AA >60 >60 ml/min/1.73m2    GFR est non-AA 55 (L) >60 ml/min/1.73m2    Calcium 8.6 8.5 - 10.1 MG/DL    Bilirubin, total 1.1 (H) 0.2 - 1.0 MG/DL    ALT (SGPT) 33 13 - 56 U/L    AST (SGOT) 39 (H) 10 - 38 U/L    Alk. phosphatase 95 45 - 117 U/L    Protein, total 6.2 (L) 6.4 - 8.2 g/dL    Albumin 2.3 (L) 3.4 - 5.0 g/dL    Globulin 3.9 2.0 - 4.0 g/dL    A-G Ratio 0.6 (L) 0.8 - 1.7     CBC WITH AUTOMATED DIFF    Collection Time: 08/02/22  2:59 AM   Result Value Ref Range    WBC 7.8 4.6 - 13.2 K/uL    RBC 4.19 (L) 4.20 - 5.30 M/uL    HGB 12.6 12.0 - 16.0 g/dL    HCT 38.8 35.0 - 45.0 %    MCV 92.6 78.0 - 100.0 FL    MCH 30.1 24.0 - 34.0 PG    MCHC 32.5 31.0 - 37.0 g/dL    RDW 13.4 11.6 - 14.5 %    PLATELET  027 - 263 K/uL     UNABLE TO REPORT ACCURATE COUNT DUE TO PLATELET AGGREGATION, HOWEVER, PLATELETS APPEAR NORMAL IN NUMBER ON SMEAR. PLEASE RESUBMIT SODIUM CITRATE (BLUE) AND EDTA (LAVENDER) TUBES FOR HEMATOLOGICAL TESTING. MPV 10.8 9.2 - 11.8 FL    NRBC 0.0 0  WBC    ABSOLUTE NRBC 0.00 0.00 - 0.01 K/uL    NEUTROPHILS 80 (H) 40 - 73 %    LYMPHOCYTES 15 (L) 21 - 52 %    MONOCYTES 4 3 - 10 %    EOSINOPHILS 1 0 - 5 %    BASOPHILS 0 0 - 2 %    IMMATURE GRANULOCYTES 0 0.0 - 0.5 %    ABS. NEUTROPHILS 6.2 1.8 - 8.0 K/UL    ABS. LYMPHOCYTES 1.2 0.9 - 3.6 K/UL    ABS. MONOCYTES 0.3 0.05 - 1.2 K/UL    ABS. EOSINOPHILS 0.1 0.0 - 0.4 K/UL    ABS. BASOPHILS 0.0 0.0 - 0.1 K/UL    ABS. IMM.  GRANS. 0.0 0.00 - 0.04 K/UL    DF AUTOMATED     TSH 3RD GENERATION    Collection Time: 08/02/22  2:59 AM   Result Value Ref Range    TSH 1.63 0.36 - 3.74 uIU/mL PROTHROMBIN TIME + INR    Collection Time: 08/02/22  8:21 AM   Result Value Ref Range    Prothrombin time 14.5 11.5 - 15.2 sec    INR 1.1 0.8 - 1.2     PLATELET COUNT    Collection Time: 08/02/22  8:21 AM   Result Value Ref Range    PLATELET 938 (L) 369 - 420 K/uL       Signed By: Alberto Martin MD     August 2, 2022      I spent 25 minutes with the patient in face-to-face consultation, of which greater than 50% was spent in counseling and coordination of care as described above    Disclaimer: Sections of this note are dictated using utilizing voice recognition software. Minor typographical errors may be present. If questions arise, please do not hesitate to contact me or call our department.

## 2022-08-02 NOTE — PROGRESS NOTES
BEDSIDE SHIFT REPORT GIVEN CRYSTAL BREWSTER(ONCOMING NURSE) BY CRYSTAL SILVER(OFFGOING NURSE). REPORT INCLUDED THE FOLLOWING INFORMATION SBAR, KARDEX, MAR, AND CARDIAC RHYTHM NSR.

## 2022-08-02 NOTE — PROGRESS NOTES
Problem: Mobility Impaired (Adult and Pediatric)  Goal: *Acute Goals and Plan of Care (Insert Text)  Description: Physical Therapy Goals  Initiated 8/2/2022 and to be accomplished within 7 day(s)  1. Patient will move from supine to sit and sit to supine , scoot up and down, and roll side to side in bed with minimal assistance/contact guard assist.    2.  Patient will transfer from bed to chair and chair to bed with minimal assistance/contact guard assist using the least restrictive device. 3.  Patient will perform sit to stand with minimal assistance/contact guard assist.  4.  Patient will ambulate with minimal assistance/contact guard assist for 25 feet with the least restrictive device. 5.  Patient will ascend/descend stairs  as needed. 6.  Patient will perform BLE therex as prescribed    PLOF: Pt lives alone, reports she has a rollator, receives some assist from brothers      Outcome: Progressing Towards Goal     PHYSICAL THERAPY EVALUATION    Patient: Rocky Lynn (62 y.o. female)  Date: 8/2/2022  Primary Diagnosis: Sepsis ([facility] [address] [A41.9]  Leukocytosis [D72.829]  Open wound of lower back [S31.000A]       Precautions:   Fall, Skin  WBAT  PLOF: see above     ASSESSMENT :  Based on the objective data described below, the patient presents with generalized weakness, impaired balance and gait, decreased functional mobility and activity tolerance. Pt required max verbal cues for sequencing and completion of all activities this date as well as heavy increased time for all mobility. Pt tolerated sitting Eob approx 10 min this date before returning to supine. Pt with decreased ROM and strength (R>L) and increased hypersensitivity to touch on the RLE. Pt left in bed with all needs met, recommend SNF at discharge. .    Patient will benefit from skilled intervention to address the above impairments.   Patient's rehabilitation potential is considered to be Good  Factors which may influence rehabilitation potential include:   []         None noted  []         Mental ability/status  [x]         Medical condition  [x]         Home/family situation and support systems  []         Safety awareness  []         Pain tolerance/management  []         Other:      PLAN :  Recommendations and Planned Interventions:   [x]           Bed Mobility Training             [x]    Neuromuscular Re-Education  [x]           Transfer Training                   []    Orthotic/Prosthetic Training  [x]           Gait Training                          []    Modalities  [x]           Therapeutic Exercises           []    Edema Management/Control  [x]           Therapeutic Activities            [x]    Family Training/Education  [x]           Patient Education  []           Other (comment):    Frequency/Duration: Patient will be followed by physical therapy 1-2 times per day/3-5 days per week to address goals. Further Equipment Recommendations for Discharge: rolling walker    AMPAC:   Current research shows that based on an AM-PAC score of 9/24  and their current functional mobility deficits, it is recommended that the patient have 3-5 sessions per week of Physical Therapy at d/c to increase the patient's independence. This AMPAC score should be considered in conjunction with interdisciplinary team recommendations to determine the most appropriate discharge setting. Patient's social support, diagnosis, medical stability, and prior level of function should also be taken into consideration. SUBJECTIVE:   Patient stated I live alone. 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    Cancer ([facility] [address] 01/01/2014    breast    Ductal carcinoma in situ of breast     Functional quadriplegia ([facility] [address]     as of 7/2022    Glaucoma     History of seizure disorder     Open back wound 07/2022 Pressure wound for lying on a table for indeterminate amount of time    Pituitary adenoma with extrasellar extension ([address]     Primary hypertension      Past Surgical History:   Procedure Laterality Date    HX HYSTERECTOMY      partial    HX MASTECTOMY  4/30/2014    RIGHT PARTIAL MASTECTOMY WITH NEEDLE LOCALIZATION MAMMOGRAM SENTINEL LYMPH NODE BIOPSY performed by Ari Tatum MD at [facility] MAIN OR     Barriers to Learning/Limitations: yes;  physical  Compensate with: Visual Cues, Verbal Cues, and Tactile Cues  Home Situation:  Home Situation  Home Environment: Private residence  # Steps to Enter: 0  One/Two Story Residence: One story  Living Alone: Yes  Support Systems: Other Family Member(s)  Patient Expects to be Discharged to[de-identified] Home with home health  Current DME Used/Available at Home: Butch Canary, quad, Walker, rolling, Izell Sarks, rollator  Tub or Shower Type: Tub/Shower combination  Critical Behavior:  Neurologic State: Alert  Orientation Level: Oriented to person;Oriented to situation  Cognition: Follows commands  Safety/Judgement: Fall prevention  Psychosocial  Patient Behaviors: Calm; Cooperative        Skin Integrity: Blister  Skin Integumentary  Skin Integrity: Blister     Strength:    Strength: Generally decreased, functional                    Tone & Sensation:   Tone: Normal              Sensation: Intact               Range Of Motion:  AROM: Generally decreased, functional    Functional Mobility:  Bed Mobility:     Supine to Sit: Moderate assistance;Assist x2; Additional time  Sit to Supine: Maximum assistance;Assist x1;Additional time (assist for BLE into bed anf Belia for trunk; pt gave minimal effort)  Scooting: Moderate assistance (towards EOB)  Transfers:  Sit to Stand:  (pt declined)    Balance:   Sitting: Impaired; Without support  Sitting - Static: Fair (occasional)  Sitting - Dynamic: Fair (occasional)         Pain:  Pain level pre-treatment: 0/10   Pain level post-treatment: 0/10   Pain Intervention(s) : Medication (see MAR); Rest, Ice, Repositioning  Response to intervention: Nurse notified    Activity Tolerance:   Good    Please refer to the flowsheet for vital signs taken during this treatment. After treatment:   []         Patient left in no apparent distress sitting up in chair  [x]         Patient left in no apparent distress in bed  []         Call bell left within reach  []         Nursing notified  []         Caregiver present  []         Bed alarm activated  []         SCDs applied    COMMUNICATION/EDUCATION:   []         Role of Physical Therapy in the acute care setting. []         Fall prevention education was provided and the patient/caregiver indicated understanding. []         Patient/family have participated as able in goal setting and plan of care. []         Patient/family agree to work toward stated goals and plan of care. []         Patient understands intent and goals of therapy, but is neutral about his/her participation. []         Patient is unable to participate in goal setting/plan of care: ongoing with therapy staff.  []         Other:     Thank you for this referral.  Lavonne Dupree   Time Calculation: 41 mins      Eval Complexity: History: MEDIUM  Complexity : 1-2 comorbidities / personal factors will impact the outcome/ POC Exam:MEDIUM Complexity : 3 Standardized tests and measures addressing body structure, function, activity limitation and / or participation in recreation  Presentation: MEDIUM Complexity : Evolving with changing characteristics  Clinical Decision Making:Medium Complexity    Overall Complexity:MEDIUM    [address] [facility] [address] [address] AM-PAC® Basic Mobility Inpatient Short Form (6-Clicks) Version 2    How much HELP from another person does the patient currently need    (If the patient hasn't done an activity recently, how much help from another person do you think he/she would need if he/she tried?)   Total (Total A or Dep)   A Lot  (Mod to Max A)   A Little (Sup or Min A) None (Mod I to I)   Turning from your back to your side while in a flat bed without using bedrails? [] 1 [x] 2 [] 3 [] 4   2. Moving from lying on your back to sitting on the side of a flat bed without using bedrails? [] 1 [x] 2 [] 3 [] 4   3. Moving to and from a bed to a chair (including a wheelchair)? [] 1 [x] 2 [] 3 [] 4   4. Standing up from a chair using your arms (e.g., wheelchair, or bedside chair)? [x] 1 [] 2 [] 3 [] 4   5. Walking in hospital room? [x] 1 [] 2 [] 3 [] 4   6. Climbing 3-5 steps with a railing?+   [x] 1 [] 2 [] 3 [] 4   +If stair climbing cannot be assessed, skip item #6. Sum responses from items 1-5. Current research shows that based on an AM-PAC score of **/24 (**/20 if omitting stairs) and their current functional mobility deficits, it is recommended that the patient have 3-5 sessions per week of Physical Therapy at d/c to increase the patient's independence.

## 2022-08-02 NOTE — PROGRESS NOTES
Patient arrived to unit from [facility] . Oriented patient to unit. Bed in the lowest position, call bell within reach.  Skin assessment completed with [facility]     Dr Dorado Courser notified of patients arrival to hospital

## 2022-08-02 NOTE — PROGRESS NOTES
[address] [facility] Pharmacokinetic Monitoring Service - Vancomycin    Consulting Provider: SRINIVASAN Zhu   Indication: Sepsis of unknown etiology  Target Concentration: Goal AUC/CORBY 400-600 mg*hr/L  Day of Therapy: 2  Additional Antimicrobials: Zosyn, Levaquin    Pertinent Laboratory Values: Wt Readings from Last 1 Encounters:   08/01/22 78.6 kg (173 lb 4.8 oz)     Temp Readings from Last 1 Encounters:   08/02/22 97.5 °F (36.4 °C)     No components found for: PROCAL  Estimated Creatinine Clearance: 52.9 mL/min (based on SCr of 1.01 mg/dL). Recent Labs     08/02/22  0259 08/01/22  1414   WBC 7.8 16.5*       Pertinent Cultures:  Culture Date Source Results   8/1 8/1 Blood  Wound In progress  In progress   MRSA Nasal Swab: N/A. Non-respiratory infection. Mckay Wilson     @91 Boyuan Wireles@    Assessment:  Date/Time Current Dose Concentration Timing of Concentration (h) AUC   8/1 1250 q24h na na 450   Note: Serum concentrations collected for AUC dosing may appear elevated if collected in close proximity to the dose administered, this is not necessarily an indication of toxicity    Plan:  Current dosing regimen is therapeutic  Continue current dose\"  vancomycin concentration ordered for 8/3 @ 0400   Pharmacy will continue to monitor patient and adjust therapy as indicated    Thank you for the consult,  Sonal Lorenz, [address]asco  8/2/2022 11:19 AM

## 2022-08-02 NOTE — WOUND CARE
Physical Exam  Room 414: Focused wound assess    Right upper back, unstageable pressure injury, 95% eschar & slough on base, POA. Lumbar back, unstageable pressure injury, 95% eschar & slough on base, POA. Left buttock, firm raised black nodule, dry base, no drainage, open to air, no odor, hyperpigmentation noted periwound, POA. Right buttock, friction injury, fleshy part of skin, red & pink base, pink healing edges, POA. Left pretibial blister, no drainage, open to air, POA. Left dorsal foot, intact serous blister, no drainage, open to air, POA. Right heel, deep tissue pressure injury, purple blister, intact skin, open to air, POA. Left medial lower leg, burst blister, pink base, POA. Topical prevention & treatment orders per nursing unit skin care protocol. Wound Care Orders upper & lower back: Unit nursing staff to cleanse wound with wound spray from par room, apply [employer] gelling fiber dressing, rope/ribbon or 4x5 rectangle, cut dressing to fit, cover with Optifoam Gentle Silicone dressing, change every 48hrs & prn soilage. Wound care orders Left inner lower leg: Unit nursing staff to apply adaptic to wound bed, then kerlix wrap, change every 48hrs & prn soilage. Leave blisters on right heel & left foot intact, if they burst, apply adaptic & kerlix wrap every 48hrs & prn soilage. Pt on [place] bed. Both heels are floated off pillow. Will turn over care to nursing staff at this time.   Salinas JACKN, RN, [facility] [place], [address]

## 2022-08-03 LAB
ACC. NO. FROM MICRO ORDER, ACCP: ABNORMAL
ACINETOBACTER CALCOACETICUS-BAUMANII COMPLEX, ACBCX: NOT DETECTED
BACTEROIDES FRAGILIS, BFRA: DETECTED
BIOFIRE COMMENT, BCIDPF: ABNORMAL
C GLABRATA DNA VAG QL NAA+PROBE: NOT DETECTED
CANDIDA ALBICANS: NOT DETECTED
CANDIDA AURIS, CAAU: NOT DETECTED
CANDIDA KRUSEI, CKRP: NOT DETECTED
CANDIDA PARAPSILOSIS, CPAUP: NOT DETECTED
CANDIDA TROPICALIS, CTROP: NOT DETECTED
CRYPTO NEOFORMANS/GATTII, CRYNEG: NOT DETECTED
ENTEROBACTER CLOACAE COMPLEX, ECCP: NOT DETECTED
ENTEROBACTERALES SP. , ENBLS: NOT DETECTED
ENTEROCOCCUS FAECALIS, ENFA: NOT DETECTED
ENTEROCOCCUS FAECIUM, ENFAM: NOT DETECTED
ESCHERICHIA COLI: NOT DETECTED
HAEMOPHILUS INFLUENZAE, HMI: NOT DETECTED
KLEBSIELLA AEROGENES, KLAE: NOT DETECTED
KLEBSIELLA OXYTOCA: NOT DETECTED
KLEBSIELLA PNEUMONIAE GROUP, KPPG: NOT DETECTED
LISTERIA MONOCYTOGENES, LMONP: NOT DETECTED
NEISSERIA MENINGITIDIS, NMNI: NOT DETECTED
PROTEUS, PRP: NOT DETECTED
PSEUDOMONAS AERUGINOSA: NOT DETECTED
RESISTANT GENE SPACE, REGENE: ABNORMAL
SALMONELLA, SALMO: NOT DETECTED
SERRATIA MARCESCENS: NOT DETECTED
STAPH EPIDERMIDIS, STEP: NOT DETECTED
STAPH LUGDUNENSIS, STALUG: NOT DETECTED
STAPHYLOCOCCUS AUREUS: NOT DETECTED
STAPHYLOCOCCUS, STAPP: NOT DETECTED
STENO MALTOPHILIA, STMA: NOT DETECTED
STREPTOCOCCUS , STPSP: NOT DETECTED
STREPTOCOCCUS AGALACTIAE (GROUP B): NOT DETECTED
STREPTOCOCCUS PNEUMONIAE , SPNP: NOT DETECTED
STREPTOCOCCUS PYOGENES (GROUP A), SPYOP: NOT DETECTED
VANCOMYCIN SERPL-MCNC: 11.3 UG/ML (ref 5–40)

## 2022-08-03 PROCEDURE — 97530 THERAPEUTIC ACTIVITIES: CPT

## 2022-08-03 PROCEDURE — 74011000250 HC RX REV CODE- 250: Performed by: INTERNAL MEDICINE

## 2022-08-03 PROCEDURE — 77030041076 HC DRSG AG OPTICELL MDII -A

## 2022-08-03 PROCEDURE — 87040 BLOOD CULTURE FOR BACTERIA: CPT

## 2022-08-03 PROCEDURE — 74011250637 HC RX REV CODE- 250/637: Performed by: INTERNAL MEDICINE

## 2022-08-03 PROCEDURE — 99232 SBSQ HOSP IP/OBS MODERATE 35: CPT | Performed by: HOSPITALIST

## 2022-08-03 PROCEDURE — 74011000258 HC RX REV CODE- 258: Performed by: EMERGENCY MEDICINE

## 2022-08-03 PROCEDURE — 2709999900 HC NON-CHARGEABLE SUPPLY

## 2022-08-03 PROCEDURE — 74011250636 HC RX REV CODE- 250/636: Performed by: INTERNAL MEDICINE

## 2022-08-03 PROCEDURE — 77030037878 HC DRSG MEPILEX >48IN BORD MOLN -B

## 2022-08-03 PROCEDURE — 97535 SELF CARE MNGMENT TRAINING: CPT

## 2022-08-03 PROCEDURE — 77030040392 HC DRSG OPTIFOAM MDII -A

## 2022-08-03 PROCEDURE — 65270000046 HC RM TELEMETRY

## 2022-08-03 PROCEDURE — 74011250636 HC RX REV CODE- 250/636: Performed by: PHYSICIAN ASSISTANT

## 2022-08-03 PROCEDURE — 36415 COLL VENOUS BLD VENIPUNCTURE: CPT

## 2022-08-03 PROCEDURE — 77030040393 HC DRSG OPTIFOAM GENT MDII -B

## 2022-08-03 PROCEDURE — 80202 ASSAY OF VANCOMYCIN: CPT

## 2022-08-03 PROCEDURE — 74011250636 HC RX REV CODE- 250/636: Performed by: EMERGENCY MEDICINE

## 2022-08-03 PROCEDURE — 77030038269 HC DRN EXT URIN PURWCK BARD -A

## 2022-08-03 RX ADMIN — PIPERACILLIN AND TAZOBACTAM 3.38 G: 3; .375 INJECTION, POWDER, FOR SOLUTION INTRAVENOUS at 17:09

## 2022-08-03 RX ADMIN — Medication 5 MG: at 22:12

## 2022-08-03 RX ADMIN — ACETAMINOPHEN 650 MG: 325 TABLET, FILM COATED ORAL at 18:40

## 2022-08-03 RX ADMIN — ACETAMINOPHEN 650 MG: 325 TABLET, FILM COATED ORAL at 08:56

## 2022-08-03 RX ADMIN — DIVALPROEX SODIUM 500 MG: 500 TABLET, EXTENDED RELEASE ORAL at 08:56

## 2022-08-03 RX ADMIN — Medication 2500 MCG: at 08:55

## 2022-08-03 RX ADMIN — GABAPENTIN 300 MG: 300 CAPSULE ORAL at 17:09

## 2022-08-03 RX ADMIN — PIPERACILLIN AND TAZOBACTAM 3.38 G: 3; .375 INJECTION, POWDER, FOR SOLUTION INTRAVENOUS at 08:55

## 2022-08-03 RX ADMIN — LEVOTHYROXINE SODIUM 88 MCG: 88 TABLET ORAL at 06:22

## 2022-08-03 RX ADMIN — SODIUM CHLORIDE, PRESERVATIVE FREE 10 ML: 5 INJECTION INTRAVENOUS at 22:12

## 2022-08-03 RX ADMIN — PYRIDOXINE HCL TAB 50 MG 200 MG: 50 TAB at 08:55

## 2022-08-03 RX ADMIN — HYDRALAZINE HYDROCHLORIDE 25 MG: 50 TABLET, FILM COATED ORAL at 17:09

## 2022-08-03 RX ADMIN — DEXAMETHASONE 2 MG: 2 TABLET ORAL at 22:12

## 2022-08-03 RX ADMIN — SODIUM CHLORIDE, PRESERVATIVE FREE 10 ML: 5 INJECTION INTRAVENOUS at 05:09

## 2022-08-03 RX ADMIN — VANCOMYCIN HYDROCHLORIDE 1250 MG: 10 INJECTION, POWDER, LYOPHILIZED, FOR SOLUTION INTRAVENOUS at 05:09

## 2022-08-03 RX ADMIN — HYDRALAZINE HYDROCHLORIDE 25 MG: 50 TABLET, FILM COATED ORAL at 22:12

## 2022-08-03 RX ADMIN — HYDRALAZINE HYDROCHLORIDE 25 MG: 50 TABLET, FILM COATED ORAL at 08:56

## 2022-08-03 RX ADMIN — DEXAMETHASONE 2 MG: 2 TABLET ORAL at 08:55

## 2022-08-03 RX ADMIN — GABAPENTIN 300 MG: 300 CAPSULE ORAL at 08:56

## 2022-08-03 RX ADMIN — SODIUM CHLORIDE, PRESERVATIVE FREE 10 ML: 5 INJECTION INTRAVENOUS at 15:06

## 2022-08-03 NOTE — ROUTINE PROCESS
Bedside and Verbal shift change report given to Sera Herrera RN (oncoming nurse) by Greg James RN (offgoing nurse). Report included the following information SBAR.

## 2022-08-03 NOTE — PROGRESS NOTES
1825- No creatinine ordered. ADB  1705- No creatinine ordered. ADB  1602- No creatinine ordered.  ADB  1420 - Dr Matt Alvarado to order stat BMP - confirmed via perfect serve - lek

## 2022-08-03 NOTE — PROGRESS NOTES
[facility] Hospitalist Group  Progress Note    Patient: Debora Pineda Age: 74 y.o. : 1955 MR#: [id number] SSN: [id number]  Date/Time: 8/3/2022     Subjective:     Patient seen and evaluated, lying in bed, no acute distress. 69-year-old female presents with complaints of left foot blister and left ankle pain. Patient reports a blister on her left ankle which ruptured and drained serous fluid with a large blister on her left foot currently. Patient also has chronic wounds on her lower back. Patient was recently diagnosed with a brain mass for which she was supposed to undergo a brain mass biopsy for possible glioblastoma however it appears that she has not undergone that procedure yet. 8/3-patient seen and evaluated, lying in bed, no acute distress. Blood cultures positive for Bacteroides, wound culture shows heavy gram-negative rods, sensitivities still pending cultures. ID on board, continue broad-spectrum IV antibiotics. Assessment/Plan:     Left foot blister, left ankle pain with chronic wounds on her lower back-continue broad-spectrum IV antibiotics, appreciate wound care input. ID consultation. ID ordered CT abdomen and pelvis for source of Bacteroides bloodstream infection  Bacteremia-continue IV antibiotics, will defer duration to infectious disease. Brain mass-patient currently has a neurosurgical appointment, was supposed to undergo biopsy for possible glioblastoma however it is currently pending. Continue dexamethasone 2 mg twice daily.   History of hypertension-resume home medications, monitor blood pressure  History of hypothyroidism-continue Synthroid  History of seizure disorder-continue gabapentin and Depakote  History of breast cancer-continue tamoxifen  DVT prophylaxis-Lovenox  Full code                Tone Felder MD  22      Case discussed with:  [x]Patient  []Family  []Nursing  []Case Management  DVT Prophylaxis:  [x]Lovenox  []Hep SQ []SCDs  []Coumadin   []On Heparin gtt    Objective:   VS: Visit Vitals  /61   Pulse 64   Temp 97.9 °F (36.6 °C)   Resp 17   Ht 5' 3\" (1.6 m)   Wt 83.3 kg (183 lb 9.6 oz)   SpO2 100%   BMI 32.52 kg/m²        Tmax/24hrs: Temp (24hrs), Av.8 °F (36.6 °C), Min:97.7 °F (36.5 °C), Max:98 °F (36.7 °C)  IOBRIEF  Intake/Output Summary (Last 24 hours) at 8/3/2022 1532  Last data filed at 8/3/2022 1608  Gross per 24 hour   Intake 840 ml   Output 1500 ml   Net -660 ml         General:  Alert, cooperative, no acute distress, blind in right eye  Pulmonary:  CTA Bilaterally. No Wheezing/Rhonchi/Rales. Cardiovascular: Regular rate and Rhythm. GI:  Soft, Non distended, Non tender. + Bowel sounds. Extremities: Bilateral lower extremity edema with open wounds  Neurologic: Alert and oriented X 3. No acute neuro deficits.   Additional:    Medications:   Current Facility-Administered Medications   Medication Dose Route Frequency    sodium chloride (NS) flush 5-10 mL  5-10 mL IntraVENous PRN    piperacillin-tazobactam (ZOSYN) 3.375 g in 0.9% sodium chloride (MBP/ADV) 100 mL MBP  3.375 g IntraVENous Q8H    sodium chloride (NS) flush 5-40 mL  5-40 mL IntraVENous Q8H    sodium chloride (NS) flush 5-40 mL  5-40 mL IntraVENous PRN    acetaminophen (TYLENOL) tablet 650 mg  650 mg Oral Q6H PRN    Or    acetaminophen (TYLENOL) suppository 650 mg  650 mg Rectal Q6H PRN    polyethylene glycol (MIRALAX) packet 17 g  17 g Oral DAILY PRN    ondansetron (ZOFRAN ODT) tablet 4 mg  4 mg Oral Q8H PRN    Or    ondansetron (ZOFRAN) injection 4 mg  4 mg IntraVENous Q6H PRN    albuterol-ipratropium (DUO-NEB) 2.5 MG-0.5 MG/3 ML  3 mL Nebulization Q6H PRN    melatonin tablet 5 mg  5 mg Oral QHS PRN    nitroglycerin (NITROBID) 2 % ointment 0.5 Inch  0.5 Inch Topical Q6H PRN    naloxone (NARCAN) injection 0.4 mg  0.4 mg IntraVENous EVERY 2 MINUTES AS NEEDED    amLODIPine (NORVASC) tablet 10 mg  10 mg Oral DAILY    cyanocobalamin (VITAMIN B12) sublingual tablet 2,500 mcg  2,500 mcg Oral DAILY    dexAMETHasone (DECADRON) tablet 2 mg  2 mg Oral Q12H    divalproex ER (DEPAKOTE ER) 24 hour tablet 500 mg  500 mg Oral DAILY    gabapentin (NEURONTIN) capsule 300 mg  300 mg Oral BID    hydrALAZINE (APRESOLINE) tablet 25 mg  25 mg Oral TID    levothyroxine (SYNTHROID) tablet 88 mcg  88 mcg Oral 6am    atenoloL (TENORMIN) tablet 100 mg  100 mg Oral DAILY    oxyCODONE-acetaminophen (PERCOCET) 5-325 mg per tablet 1 Tablet  1 Tablet Oral Q4H PRN    pyridoxine (vitamin B6) (VITAMIN B-6) tablet 200 mg  200 mg Oral DAILY       Imaging:   XR Results (most recent):  Results from Hospital Encounter encounter on 08/01/22    XR CHEST PORT    Narrative  EXAM: XR CHEST PORT    CLINICAL INDICATION/HISTORY: 74 years Female. chest pain. Additional History: None    TECHNIQUE: Frontal view of the chest    COMPARISON: Chest radiograph 3/2/2020    FINDINGS:    The cardiac silhouette appears within normal limits. Tortuosity of the thoracic  aorta, unchanged in appearance. Pulmonary vasculature appears within normal  limits. No confluent airspace opacity is appreciated. No definite evidence of  pleural effusion or pneumothorax. No acute osseous abnormality appreciated. Impression  No radiographic evidence of acute cardiopulmonary process. CT Results (most recent):  No results found for this or any previous visit. MRI Results (most recent):  Results from [place] encounter on 03/31/14    MRI BREAST BX VAC ASSIST RT    Addendum 4/3/2014  4:54 PM  Addendum: Pathology changes from right breast biopsy 12:00 position-  Fibrocystic changes with florid ductal epithelial hyperplasia. The findings are benign and concordant. Narrative  MR guided left breast biopsy    HISTORY: Left breast cancer, abnormal MRI    COMPARISON: MRI March 2014    FINDINGS:    Informed consent was obtained.   The risks of the procedure including but not  limited to bleeding and infection were reviewed with the patient who wished to  proceed. Preliminary pre and post contrast images were performed. 20cc  Magnevist IV was administered. With the patient positioned prone, and following placement in the compression  device, the left breast was imaged, and the lesion in the 12:00 left breast was  targeted. Appropriate coordinates records were obtained. The skin was  cleansed and local anesthesia administered. After making a small incision in  the skin with a # 11 scalpel blade, the trocar and biopsy sheath were advanced  to the appropriate depth. After confirmation of appropriate positioning the 9  gauge 20 mm vacuum assisted biopsy needle was advanced to the appropriate  depth. Subsequently a series of approximately 12 vacuum assisted samples were  acquired in a radial fashion. Prior to removal of the sheath, using coaxial technique, a [employer]  cylinder shaped clip was placed to oscar area sampled. The patient was removed from the biopsy device, the wound was dressed and post  biopsy instructions were given to the patient. The samples were sent to the  laboratory for analysis. Diagnostic Mammogram left Breast: CC and MLO views were obtained of the left  breast and document the clip at the site of sampling in the 12:00 left breast.    Complications: No complications. Specimen: 12 vacuum assisted samples  Assistant: None. EBL: 5 cc    Impression  :    Status post MRI guided biopsy of an abnormal area of enhancement in the 12:00  left breast.  Area sampled marked with a     clip. Samples sent to the  laboratory for analysis. Labs:    Recent Results (from the past 48 hour(s))   CULTURE, WOUND W GRAM STAIN    Collection Time: 08/01/22  5:31 PM    Specimen: Skin;  Wound   Result Value Ref Range    Special Requests: NO SPECIAL REQUESTS      GRAM STAIN 3+ GRAM POSITIVE COCCI IN CLUSTERS      GRAM STAIN 2+ GRAM POSITIVE RODS (CORYNEFORM)      GRAM STAIN 3+ GRAM NEGATIVE RODS Culture result: HEAVY GRAM NEGATIVE RODS (A)      Culture result: HEAVY POSSIBLE 2ND GRAM NEGATIVE ECTOR (A)      Culture result: MODERATE  POSSIBLE  3RD GRAM NEGATIVE ECTOR   (A)      Culture result: HEAVY PROBABLE STAPHYLOCOCCUS AUREUS (A)     METABOLIC PANEL, COMPREHENSIVE    Collection Time: 08/02/22  2:59 AM   Result Value Ref Range    Sodium 144 136 - 145 mmol/L    Potassium 3.6 3.5 - 5.5 mmol/L    Chloride 108 100 - 111 mmol/L    CO2 28 21 - 32 mmol/L    Anion gap 8 3.0 - 18 mmol/L    Glucose 104 (H) 74 - 99 mg/dL    BUN 16 7.0 - 18 MG/DL    Creatinine 1.01 0.6 - 1.3 MG/DL    BUN/Creatinine ratio 16 12 - 20      GFR est AA >60 >60 ml/min/1.73m2    GFR est non-AA 55 (L) >60 ml/min/1.73m2    Calcium 8.6 8.5 - 10.1 MG/DL    Bilirubin, total 1.1 (H) 0.2 - 1.0 MG/DL    ALT (SGPT) 33 13 - 56 U/L    AST (SGOT) 39 (H) 10 - 38 U/L    Alk. phosphatase 95 45 - 117 U/L    Protein, total 6.2 (L) 6.4 - 8.2 g/dL    Albumin 2.3 (L) 3.4 - 5.0 g/dL    Globulin 3.9 2.0 - 4.0 g/dL    A-G Ratio 0.6 (L) 0.8 - 1.7     CBC WITH AUTOMATED DIFF    Collection Time: 08/02/22  2:59 AM   Result Value Ref Range    WBC 7.8 4.6 - 13.2 K/uL    RBC 4.19 (L) 4.20 - 5.30 M/uL    HGB 12.6 12.0 - 16.0 g/dL    HCT 38.8 35.0 - 45.0 %    MCV 92.6 78.0 - 100.0 FL    MCH 30.1 24.0 - 34.0 PG    MCHC 32.5 31.0 - 37.0 g/dL    RDW 13.4 11.6 - 14.5 %    PLATELET  057 - 526 K/uL     UNABLE TO REPORT ACCURATE COUNT DUE TO PLATELET AGGREGATION, HOWEVER, PLATELETS APPEAR NORMAL IN NUMBER ON SMEAR. PLEASE RESUBMIT SODIUM CITRATE (BLUE) AND EDTA (LAVENDER) TUBES FOR HEMATOLOGICAL TESTING. MPV 10.8 9.2 - 11.8 FL    NRBC 0.0 0  WBC    ABSOLUTE NRBC 0.00 0.00 - 0.01 K/uL    NEUTROPHILS 80 (H) 40 - 73 %    LYMPHOCYTES 15 (L) 21 - 52 %    MONOCYTES 4 3 - 10 %    EOSINOPHILS 1 0 - 5 %    BASOPHILS 0 0 - 2 %    IMMATURE GRANULOCYTES 0 0.0 - 0.5 %    ABS. NEUTROPHILS 6.2 1.8 - 8.0 K/UL    ABS. LYMPHOCYTES 1.2 0.9 - 3.6 K/UL    ABS.  MONOCYTES 0.3 0.05 - 1.2 K/UL ABS. EOSINOPHILS 0.1 0.0 - 0.4 K/UL    ABS. BASOPHILS 0.0 0.0 - 0.1 K/UL    ABS. IMM. GRANS. 0.0 0.00 - 0.04 K/UL    DF AUTOMATED     TSH 3RD GENERATION    Collection Time: 08/02/22  2:59 AM   Result Value Ref Range    TSH 1.63 0.36 - 3.74 uIU/mL   PROTHROMBIN TIME + INR    Collection Time: 08/02/22  8:21 AM   Result Value Ref Range    Prothrombin time 14.5 11.5 - 15.2 sec    INR 1.1 0.8 - 1.2     PLATELET COUNT    Collection Time: 08/02/22  8:21 AM   Result Value Ref Range    PLATELET 864 (L) 908 - 420 K/uL   VANCOMYCIN, RANDOM    Collection Time: 08/03/22  2:21 AM   Result Value Ref Range    Vancomycin, random 11.3 5.0 - 40.0 UG/ML       Signed By: Dhara Barr MD     August 3, 2022      I spent 25 minutes with the patient in face-to-face consultation, of which greater than 50% was spent in counseling and coordination of care as described above    Disclaimer: Sections of this note are dictated using utilizing voice recognition software. Minor typographical errors may be present. If questions arise, please do not hesitate to contact me or call our department.

## 2022-08-03 NOTE — PROGRESS NOTES
Infectious Disease Consultation Note        Reason: Gram-negative bloodstream infection    Current abx Prior abx   Zosyn, levofloxacin, vancomycin since 8/1/2022      Lines:       Assessment :     74 y.o. female with PMHx significant for htn, seizures, breast cancer, recently diagnosed glioblastoma with vasogenic edema (7/2022), recent hospitalization at [facility] who presented to [facility] ER on 8/1/22 with complaint of Left Foot Blister and Left Ankle Pain. [facility] AT [place] 7/1-7/4 for fall, diagnosed to have glioblastoma multiforme with mass effect s/p decadron    Hospitalization at [address] [facility] 7/14/22-7/22/22 for fall, AMS (declined hospice during that admission)    Clinical presentation concerning for sepsis-present on admission due to Bacteroides bloodstream infection. Exact source of Bacteroides bloodstream infection not entirely clear at this time. Bacteroides usually originates from GI tract or necrotic soft tissue infection. Patient does not have any GI complaints. Does have significant necrotic lesion in the back. No purulence or fluctuance noted on today's exam.  Will obtain imaging studies to rule out deeper infection as a cause of bloodstream infection. Immunocompromise state due to concurrent steroids can mask the full clinical presentation    Necrotic lower back ulcers, pressure necrosis right heel-likely due to prolonged period of unresponsiveness/pressure injury July 2022    Left foot blister, left leg ulcer-no clinical evidence of infection noted on today's exam.  Wound culture 8/1/2022-gram-negative rods, Staph aureus-likely colonizer    Glioblastoma multiforme-plans for outpatient neurosurgery noted.   Currently on Decadron    Recommendations:    Continue piperacillin/tazobactam.  Discontinue levofloxacin, vancomycin  Repeat blood culture today to determine clearance of bloodstream infection  Obtain CT abdomen/pelvis, lumbar soft tissue to evaluate for source of Bacteroides bloodstream infection-discussed with CT department/tech  Follow-up psych recommendations  Continue local wound care left leg ulcer, pressure offloading right heel, wound care back ulcers    Thank you for consultation request. Above plan was discussed in details with patient, RN and dr Arsenio Serna Please call me if any further questions or concerns. Will continue to participate in the care of this patient. HPI:     74 y.o. female with PMHx significant for htn, seizures, breast cancer, recently diagnosed glioblastoma with vasogenic edema (7/2022), recent hospitalization at [facility] who presented to [facility] ER on 8/1/22 with complaint of Left Foot Blister and Left Ankle Pain. I obtained history by talking to patient, review of [facility] records. Patient recently had 2 hospitalizations at [facility] in July 2022. Admitted to [facility] AT [place] 7/1-7/4 after being found on the floor by her brother because she was tired and had fallen. Her head CT at that time showed a new brain mass, midline at the genu of the corpus callosum with associated vasogenic edema. She was initially treated with Decadron but that was stopped on discharge in anticipation of biopsy. As an outpatient the [facility] office had been unable to get in touch with her to schedule a biopsy so they reached out to her brother, and then to the police to check on her. The police found her to be lying on a table for an unknown period of time which has caused pressure injuries to her back . She was admitted 7/14-7/22/22 to [facility].  WBC 21.1 (up from 13.8 on 7/2). CMP showed hypernatremia to 150, OPAL with a SCr 1.8 (previously normal). CPK is >1700. She was treated with steroids and anti-epileptics, IVF. Palliative, NSG, Psychiatry consulted. She was deemed to be competent to make decisions. Initially her family member wanted to transition her to comfort measures/hospice. However patient declined hospice. She was discharged home with home health on 7/22.   Patient subsequently developed a blister on her left ankle. She came to [facility] for further evaluation. In [facility] ER, Patient is noted to have Temperature 99.1°F, Heart Rate 110 bpm, Respiration Rate 23 bpm, Blood Pressure 122/67 mm Hg to 152/66 mm Hg, SpO2 99% on Room Air, WBC 16.5, Neut% 78%, Neut# 12.8, UA (-), K+ 3.4, mmol/L, Glucose 109 mg/dL, BUN 16, Creatinine 1.00, eGFR 55/>60, Albumin 2.9, ALT 42, AST 36, Alk Phos 123, , Pro-, Troponin 9 ng/L, and Lactic Acid 3.95. Patient received IV Vancomycin 1,000 mg, IV Zosyn 4.5 grams, IV Levofloxacin 740 mg, and NS 1 L in [facility] ER. Patient was admitted with a diagnosis of severe sepsis. Blood culture now positive for gram-negative rods. I have been consulted for further recommendations. Patient denies any fever or chills throughout this time. She denies any worsening back pain at the site of back ulcer. Denies any abdominal pain, diarrhea, constipation, nausea, vomiting. Complains of pain in bilateral foot/left leg when touched. Denies prior history of MRSA colonization or infection      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    Cancer ([address] 01/01/2014    breast    Ductal carcinoma in situ of breast     Functional quadriplegia ([address]     as of 7/2022    Glaucoma     History of seizure disorder     Open back wound 07/2022    Pressure wound for lying on a table for indeterminate amount of time    Pituitary adenoma with extrasellar extension ([address]     Primary hypertension        Past Surgical History:   Procedure Laterality Date    HX HYSTERECTOMY      partial    HX MASTECTOMY  4/30/2014    RIGHT PARTIAL MASTECTOMY WITH NEEDLE LOCALIZATION MAMMOGRAM SENTINEL LYMPH NODE BIOPSY performed by Ari Tautm MD at [address] [place] [address] Medication List      Details   amLODIPine (NORVASC) 10 mg tablet Take 10 mg by mouth in the morning. dexAMETHasone (DECADRON) 2 mg tablet Take  by mouth every twelve (12) hours. levothyroxine (SYNTHROID) 88 mcg tablet Take 88 mcg by mouth Daily (before breakfast). divalproex ER (DEPAKOTE ER) 500 mg ER tablet Take 500 mg by mouth. hydrALAZINE (APRESOLINE) 25 mg tablet Take 25 mg by mouth three (3) times daily. gabapentin (NEURONTIN) 300 mg capsule Take 1 tab PO QHS x1 week then increase to BID thereafter  Qty: 60 Cap, Refills: 1      baclofen (LIORESAL) 10 mg tablet Take 1 Tab by mouth three (3) times daily. Qty: 90 Tab, Refills: 0      tamoxifen (NOLVADEX) 10 mg tablet Take  by mouth daily. oxyCODONE-acetaminophen (PERCOCET) 5-325 mg per tablet 1 or 2 tablets by mouth every 4 hours as needed  Qty: 40 Tab, Refills: 0      !! OTHER Vitamin D Po      nebivoloL (BYSTOLIC) 20 mg tablet Take  by mouth daily. torsemide (DEMADEX) 20 mg tablet Take  by mouth daily. !! OTHER Occuvite eye vitamin OTC      cyanocobalamin (VITAMIN B12) 2,500 mcg sublingual tablet Take 2,500 mcg by mouth daily. pyridoxine, vitamin B6, 200 mg tablet Take 200 mg by mouth daily. BIOTIN PO Take 1,000 mg by mouth. diazePAM (VALIUM) 10 mg tablet TAKE 1 TAB PO 30 MINS PRIOR TO MRI (MUST HAVE SOMEONE TO DRIVE TO AND FROM THE FACILITY)  Qty: 1 Tab, Refills: 0    Associated Diagnoses: Lumbar radiculitis; Lumbar spondylosis      diclofenac (VOLTAREN) 1 % gel QID to affected areas PRN for pain  Qty: 500 g, Refills: 5       !! - Potential duplicate medications found. Please discuss with provider.           Current Facility-Administered Medications   Medication Dose Route Frequency    sodium chloride (NS) flush 5-10 mL  5-10 mL IntraVENous PRN    levoFLOXacin (LEVAQUIN) 750 mg in D5W IVPB  750 mg IntraVENous Q24H    piperacillin-tazobactam (ZOSYN) 3.375 g in 0.9% sodium chloride (MBP/ADV) 100 mL MBP  3.375 g IntraVENous Q8H    sodium chloride (NS) flush 5-40 mL  5-40 mL IntraVENous Q8H    sodium chloride (NS) flush 5-40 mL  5-40 mL IntraVENous PRN    acetaminophen (TYLENOL) tablet 650 mg  650 mg Oral Q6H PRN    Or    acetaminophen (TYLENOL) suppository 650 mg  650 mg Rectal Q6H PRN    polyethylene glycol (MIRALAX) packet 17 g  17 g Oral DAILY PRN    ondansetron (ZOFRAN ODT) tablet 4 mg  4 mg Oral Q8H PRN    Or    ondansetron (ZOFRAN) injection 4 mg  4 mg IntraVENous Q6H PRN    albuterol-ipratropium (DUO-NEB) 2.5 MG-0.5 MG/3 ML  3 mL Nebulization Q6H PRN    melatonin tablet 5 mg  5 mg Oral QHS PRN    nitroglycerin (NITROBID) 2 % ointment 0.5 Inch  0.5 Inch Topical Q6H PRN    naloxone (NARCAN) injection 0.4 mg  0.4 mg IntraVENous EVERY 2 MINUTES AS NEEDED    vancomycin (VANCOCIN) 1250 mg in  ml infusion  1,250 mg IntraVENous Q24H    amLODIPine (NORVASC) tablet 10 mg  10 mg Oral DAILY    cyanocobalamin (VITAMIN B12) sublingual tablet 2,500 mcg  2,500 mcg Oral DAILY    dexAMETHasone (DECADRON) tablet 2 mg  2 mg Oral Q12H    divalproex ER (DEPAKOTE ER) 24 hour tablet 500 mg  500 mg Oral DAILY    gabapentin (NEURONTIN) capsule 300 mg  300 mg Oral BID    hydrALAZINE (APRESOLINE) tablet 25 mg  25 mg Oral TID    levothyroxine (SYNTHROID) tablet 88 mcg  88 mcg Oral 6am    atenoloL (TENORMIN) tablet 100 mg  100 mg Oral DAILY    oxyCODONE-acetaminophen (PERCOCET) 5-325 mg per tablet 1 Tablet  1 Tablet Oral Q4H PRN    pyridoxine (vitamin B6) (VITAMIN B-6) tablet 200 mg  200 mg Oral DAILY       Allergies: Patient has no known allergies. Family History   Problem Relation Age of Onset    Colon Cancer Mother      Social History     Socioeconomic History    Marital status:      Spouse name: Not on file    Number of children: Not on file    Years of education: Not on file    Highest education level: Not on file   Occupational History    Not on file   Tobacco Use    Smoking status: Never    Smokeless tobacco: Never   Substance and Sexual Activity    Alcohol use: No     Alcohol/week: 0.0 standard drinks    Drug use:  No Sexual activity: Not on file   Other Topics Concern    Not on file   Social History Narrative    Not on file     Social Determinants of Health     Financial Resource Strain: Not on file   Food Insecurity: Not on file   Transportation Needs: Not on file   Physical Activity: Not on file   Stress: Not on file   Social Connections: Not on file   Intimate Partner Violence: Not on file   Housing Stability: Not on file     Social History     Tobacco Use   Smoking Status Never   Smokeless Tobacco Never        Temp (24hrs), Av.7 °F (36.5 °C), Min:97.4 °F (36.3 °C), Max:98 °F (36.7 °C)    Visit Vitals  /70   Pulse 73   Temp 97.7 °F (36.5 °C)   Resp 17   Ht 5' 3\" (1.6 m)   Wt 83.3 kg (183 lb 9.6 oz)   SpO2 100%   BMI 32.52 kg/m²       ROS: 12 point ROS obtained in details. Pertinent positives as mentioned in HPI,   otherwise negative    Physical Exam:    General:  female patient laying on the bed AAOx3 in no acute distress. General:   awake alert and oriented   HEENT:  Normocephalic, atraumatic, right eye whitish cornea,  no scleral icterus or pallor; no conjunctival hemmohage;  nasal and oral mucous are moist and without evidence of lesions. Dentition good. Neck supple, no bruits. Lymph Nodes:   Not examined   Lungs:   non-labored, bilateral chest movements equal, no audible wheezing   Heart:  RRR, s1 and s2; no  rubs or gallops, no edema   Abdomen:  soft, non-distended,  no hepatomegaly, no splenomegaly. Non-tender   Genitourinary:  No galvez   Extremities:   no clubbing, cyanosis; superficial pressure necrosis right heel, blister on dorsal aspect of left foot-no surrounding erythema. Superficial ulceration medial aspect of right leg with red granulation tissue-no purulent drainage/no surrounding erythema, muscle mass appropriate for age   Neurologic:  No gross focal sensory abnormalities; 5/5 muscle strength to upper and lower extremities. Speech appropriate.  Cranial nerves intact Skin:  Skin changes bilateral lower extremity as mentioned above. Linear ulceration lumbar region with dry necrotic tissue-no surrounding erythema, no purulent drainage; dry necrotic ulcer upper back; superficial ulcer right buttock- no drainage   Back:  no spinal or paraspinal muscle tenderness or rigidity, no CVA tenderness; skin changes as mentioned above     Psychiatric:  No suicidal or homicidal ideations, appropriate mood and affect         Labs: Results:   Chemistry Recent Labs     08/02/22  0259 08/01/22  1414   * 109*    140   K 3.6 3.4*    103   CO2 28 31   BUN 16 16   CREA 1.01 1.00   CA 8.6 9.3   AGAP 8 6   BUCR 16 16   AP 95 123*   TP 6.2* 6.6   ALB 2.3* 2.9*   GLOB 3.9 3.7   AGRAT 0.6* 0.8      CBC w/Diff Recent Labs     08/02/22  0821 08/02/22  0259 08/01/22  1414   WBC  --  7.8 16.5*   RBC  --  4.19* 4.06*   HGB  --  12.6 12.2   HCT  --  38.8 36.5   * UNABLE TO REPORT ACCURATE COUNT DUE TO PLATELET AGGREGATION, HOWEVER, PLATELETS APPEAR NORMAL IN NUMBER ON SMEAR. PLEASE RESUBMIT SODIUM CITRATE (BLUE) AND EDTA (LAVENDER) TUBES FOR HEMATOLOGICAL TESTING. 232   GRANS  --  80* 78*   LYMPH  --  15* 14*   EOS  --  1 0      Microbiology Recent Labs     08/01/22  1731 08/01/22  1430 08/01/22  1414   CULT PENDING PROBABLE BACTEROIDES FRAGILIS GROWING IN 1 OF 2 BOTTLES DRAWN No Site Indicated*  Sent to [facility] for ID/Susceptibility if indicated. NO GROWTH 2 DAYS          RADIOLOGY:    All available imaging studies/reports in [facility] for this admission were reviewed      Disclaimer: Sections of this note are dictated utilizing voice recognition software, which may have resulted in some phonetic based errors in grammar and contents. Even though attempts were made to correct all the mistakes, some may have been missed, and remained in the body of the document. If questions arise, please contact our department.     Dr. Lilly Snyder, Infectious Disease Specialist  [phone]  August 3, 2022  7:03 AM

## 2022-08-03 NOTE — PROGRESS NOTES
Problem: Self Care Deficits Care Plan (Adult)  Goal: *Acute Goals and Plan of Care (Insert Text)  Description: Occupational Therapy Goals  Initiated 8/2/2022 within 7 day(s). 1.  Patient will perform bed mobility in preparation for self-care with minimal assistance/contact guard assist x 1, bed simulated to home environment . 2.  Patient will perform upper body dressing with supervision/set-up. 3.  Patient will perform functional activity sitting EOB with modified independence > 5 minutes, G balance. 4.  Patient will perform toilet transfers with minimal assistance/contact guard assist.  5.  Patient will perform all aspects of toileting with minimal assistance/contact guard assist.  6.  Patient will participate in upper extremity therapeutic exercise/activities with supervision/set-up for 8 minutes. 7.  Patient will utilize energy conservation techniques during functional activities with verbal cues. Prior Level of Function: Patient reports she needed assist with self-care prn from her brothers and used a rollator for functional mobility PTA. Outcome: Progressing Towards Goal         OCCUPATIONAL THERAPY TREATMENT    Patient: Travon Mendoza (25 y.o. female)  Date: 8/3/2022  Diagnosis: Sepsis ([address] [A41.9]  Leukocytosis [D72.829]  Open wound of lower back [S31.000A] Severe sepsis [facility])    Precautions: Fall, Skin      Chart, occupational therapy assessment, plan of care, and goals were reviewed. ASSESSMENT:  Patient cleared to participate in OT treatment by RN. Upon entering the room, the patient was supine in bed, alert, and agreeable to participate in OT session. Patient was seen with PT to maximize patient safety, participation, and functional mobility in preparation for self-care tasks. Patient seen for second day consecutively by therapy and still demos poor insight into deficits/ assist levels with tasks despite education.  Patient expressing wishes to go home, pt living alone but reports support from family/ neighbors. Patient with increased time noted for all tasks, moderate encouragement needed once EOB for standing. Patient moderate assist for bed mobility, moderate - maximal assist x 2 for standing, total assist for lower body dressing and stand by assist for UBD, grooming and self-feeding. Patient with increased R heel pain standing, unable to take steps nor weight shift despite many attempts. Patient ended session supine in bed, all needs met and call bell in reach. Progression toward goals:  [x]          Improving appropriately and progressing toward goals  []          Improving slowly and progressing toward goals  []          Not making progress toward goals and plan of care will be adjusted     PLAN:  Patient continues to benefit from skilled intervention to address the above impairments. Continue treatment per established plan of care. Further Equipment Recommendations for Discharge:  bedside commode, hospital bed, mechanical lift, transfer bench, wheelchair     AMPAC: Current research shows that based on an AM-PAC score of 15/24 and their current ADL deficits; it is recommended that the patient have 3-5 sessions per week of Occupational Therapy at d/c to increase the patient's independence. This AMPAC score should be considered in conjunction with interdisciplinary team recommendations to determine the most appropriate discharge setting. Patient's social support, diagnosis, medical stability, and prior level of function should also be taken into consideration. SUBJECTIVE:   Patient stated \" Cant you just raise the head of the bed up some more for me?  and \" just wait a minute, you cant rush me\" (Pt observed to take approx.  10 minutes with bed mobility d/t rest breaks)    OBJECTIVE DATA SUMMARY:   Cognitive/Behavioral Status:  Neurologic State: Alert  Orientation Level: Oriented X4  Cognition: Follows commands  Safety/Judgement: Fall prevention, Lack of insight into deficits    Functional Mobility and Transfers for ADLs:   Bed Mobility:  Supine to Sit: Moderate assistance;Assist x1;Bed Modified; Additional time (assist for trunk and hips)  Sit to Supine: Moderate assistance;Assist x1 (assist for BLE only)  Scooting: Moderate assistance;Assist x1 (towards EOB)     Transfers:  Sit to Stand: Moderate assistance;Maximum assistance;Assist x2  Stand to Sit: Moderate assistance;Assist x2    Balance:  Sitting: Impaired; Without support  Sitting - Static: Good (unsupported)  Sitting - Dynamic: Fair (occasional)  Standing: Impaired; With support  Standing - Static: Fair    ADL Intervention:  Feeding  Feeding Assistance: Set-up; Stand-by assistance  Food to Mouth: Stand-by assistance (pt taking > 7 medications one by one)    Grooming  Grooming Assistance: Set-up; Stand-by assistance  Position Performed: Seated edge of bed  Brushing Teeth: Stand-by assistance (applying lip moisturizer)    Upper Body Dressing Assistance  Dressing Assistance: Stand-by assistance  Shirt simulation with hospital gown: Stand-by assistance (pt pulling open back of gown sitting EOB to scratch her back)    Lower Body Dressing Assistance  Dressing Assistance: Total assistance(dependent)  Socks: Total assistance (dependent) (BLE)  Position Performed: Seated edge of bed    Cognitive Retraining  Safety/Judgement: Fall prevention;Lack of insight into deficits      Pain:  Pain level pre-treatment: 0/10 , at rest  Pain level post-treatment: 5/10 , R heel  Pain Intervention(s): Medication (see MAR); Rest, Ice, Repositioning   Response to intervention: Nurse notified, See doc flow    Activity Tolerance:    Fair      Please refer to the flowsheet for vital signs taken during this treatment.   After treatment:   []  Patient left in no apparent distress sitting up in chair  [x]  Patient left in no apparent distress in bed  [x]  Call bell left within reach  [x]  Nursing notified  []  Caregiver present  []  Bed alarm activated    COMMUNICATION/EDUCATION:   [x] Role of Occupational Therapy in the acute care setting  [x] Home safety education was provided and the patient/caregiver indicated understanding. [x] Patient/family have participated as able in working towards goals and plan of care. [x] Patient/family agree to work toward stated goals and plan of care. [] Patient understands intent and goals of therapy, but is neutral about his/her participation. [] Patient is unable to participate in goal setting and plan of care. Thank you for this referral.  Marvel Mo, OTR/L  Time Calculation: 56 mins    [employer] AM-PAC® Daily Activity Inpatient Short Form (6-Clicks)*    How much HELP from another person does the patient currently need    (If the patient hasn't done an activity recently, how much help from another person do you think he/she would need if he/she tried?)   Total (Total A or Dep)   A Lot  (Mod to Max A)   A Little (Sup or Min A)   None (Mod I to I)   Putting on and taking off regular lower body clothing? [] 1 [x] 2 [] 3 [] 4   2. Bathing (including washing, rinsing,      drying)? [] 1 [x] 2 [] 3 [] 4   3. Toileting, which includes using toilet, bedpan or urinal?   [] 1 [x] 2 [] 3 [] 4   4. Putting on and taking off regular upper body clothing? [] 1 [] 2 [x] 3 [] 4   5. Taking care of personal grooming such as brushing teeth? [] 1 [] 2 [x] 3 [] 4   6. Eating meals?    [] 1 [] 2 [x] 3 [] 4

## 2022-08-03 NOTE — PROGRESS NOTES
Problem: Falls - Risk of  Goal: *Absence of Falls  Description: Document Willam Spaulding Fall Risk and appropriate interventions in the flowsheet. Outcome: Progressing Towards Goal  Note: Fall Risk Interventions:     Medication Interventions: Bed/chair exit alarm    Elimination Interventions: Call light in reach, Toilet paper/wipes in reach      Problem: Pressure Injury - Risk of  Goal: *Prevention of pressure injury  Description: Document Bret Scale and appropriate interventions in the flowsheet.   Outcome: Progressing Towards Goal  Note: Pressure Injury Interventions:  Sensory Interventions: Assess changes in LOC, Keep linens dry and wrinkle-free, Maintain/enhance activity level, Minimize linen layers    Moisture Interventions: Absorbent underpads, Check for incontinence Q2 hours and as needed, Apply protective barrier, creams and emollients    Activity Interventions: Pressure redistribution bed/mattress(bed type)    Mobility Interventions: HOB 30 degrees or less, Pressure redistribution bed/mattress (bed type)    Nutrition Interventions: Document food/fluid/supplement intake    Friction and Shear Interventions: HOB 30 degrees or less, Minimize layers, Foam dressings/transparent film/skin sealants

## 2022-08-03 NOTE — PROGRESS NOTES
[facility] Pharmacokinetic Monitoring Service - Vancomycin    Consulting Provider: SRINIVASAN Toro   Indication: Sepsis of unknown etiology  Target Concentration: Goal AUC/CORBY 400-600 mg*hr/L  Day of Therapy: 3  Additional Antimicrobials: Zosyn, Levaquin    Pertinent Laboratory Values: Wt Readings from Last 1 Encounters:   08/02/22 83.3 kg (183 lb 9.6 oz)     Temp Readings from Last 1 Encounters:   08/03/22 97.7 °F (36.5 °C)     No components found for: PROCAL  Estimated Creatinine Clearance: 54.5 mL/min (based on SCr of 1.01 mg/dL). Recent Labs     08/02/22  0259 08/01/22  1414   WBC 7.8 16.5*       Pertinent Cultures:  Culture Date Source Results   8/1 8/1 Blood  Wound Bacteroides Fragilis  In process   MRSA Nasal Swab: N/A. Non-respiratory infection. Dalia Senior     @[facility]@    Assessment:  Date/Time Current Dose Concentration Timing of Concentration (h) AUC   8/1 1250 q24h na na 450   8/3 1250 q24h 13.4 19h 57m 460   Note: Serum concentrations collected for AUC dosing may appear elevated if collected in close proximity to the dose administered, this is not necessarily an indication of toxicity    Plan:  Current dosing regimen is therapeutic  Continue current dose  No new vancomycin concentration ordered at this time  Pharmacy will continue to monitor patient and adjust therapy as indicated    Thank you for the consult,  ANASTASIYA Wu  8/3/2022 8:08 AM

## 2022-08-03 NOTE — PROGRESS NOTES
Bedside and Verbal shift change report given to [address] (oncoming nurse) by Rebecca Martel RN (offgoing nurse). Report included the following information SBAR, Kardex, Intake/Output, MAR, and Recent Results.

## 2022-08-03 NOTE — PROGRESS NOTES
Problem: Mobility Impaired (Adult and Pediatric)  Goal: *Acute Goals and Plan of Care (Insert Text)  Description: Physical Therapy Goals  Initiated 8/2/2022 and to be accomplished within 7 day(s)  1. Patient will move from supine to sit and sit to supine , scoot up and down, and roll side to side in bed with minimal assistance/contact guard assist.    2.  Patient will transfer from bed to chair and chair to bed with minimal assistance/contact guard assist using the least restrictive device. 3.  Patient will perform sit to stand with minimal assistance/contact guard assist.  4.  Patient will ambulate with minimal assistance/contact guard assist for 25 feet with the least restrictive device. 5.  Patient will ascend/descend stairs  as needed. 6.  Patient will perform BLE therex as prescribed    PLOF: Pt lives alone, reports she has a rollator, receives some assist from brothers      Outcome: Progressing Towards Goal     PHYSICAL THERAPY TREATMENT    Patient: Dillon Marin (72 y.o. female)  Date: 8/3/2022  Diagnosis: Sepsis ([facility] [address] [A41.9]  Leukocytosis [D72.829]  Open wound of lower back [S31.000A] Severe sepsis ([facility] [address]      Precautions: Fall, Skin  PLOF: see above     ASSESSMENT:  Pt received in bed and agreeable. Seen with OT to maximize functional mobility and safety. Pt requires max verbal cues for sequencing and encouragement for all mobility, pt continues to need heavy increased time for all activity despite cues. Pt reporting pain to R heel primarily during session. Pt was able to stand with mod A x 2 and use of RW this date, only tolerates approx 2-3 min off standing, unable to weight shift to off weight LEs to side step or march in place despite visual and verbal cues to maximize use of RW. At end of session, pt returned to bed and left with all needs met and call bell within reach.  At this time, recommend DC to SNF as pt needing increased assist and time for all functional mobility- at least mod A and almost an hour to come to EOB and perform one brief standing trial- pt reports she lives alone. Progression toward goals:   []      Improving appropriately and progressing toward goals  []      Improving slowly and progressing toward goals  []      Not making progress toward goals and plan of care will be adjusted     PLAN:  Patient continues to benefit from skilled intervention to address the above impairments. Continue treatment per established plan of care. Further Equipment Recommendations for Discharge:  rolling walker    AMPAC: Current research shows that based on an AM-PAC score of 10/24 (9/20 if omitting stairs) and their current functional mobility deficits, it is recommended that the patient have 3-5 sessions per week of Physical Therapy at d/c to increase the patient's independence. This AMPAC score should be considered in conjunction with interdisciplinary team recommendations to determine the most appropriate discharge setting. Patient's social support, diagnosis, medical stability, and prior level of function should also be taken into consideration. SUBJECTIVE:   Patient stated I am in pain .     OBJECTIVE DATA SUMMARY:   Critical Behavior:  Neurologic State: Alert  Orientation Level: Oriented X4  Cognition: Follows commands  Safety/Judgement: Fall prevention, Lack of insight into deficits  Functional Mobility Training:  Bed Mobility:     Supine to Sit: Moderate assistance;Assist x1;Bed Modified; Additional time (assist for trunk and hips)  Sit to Supine: Moderate assistance;Assist x1 (assist for BLE only)  Scooting: Moderate assistance;Assist x1 (towards EOB)         Transfers:  Sit to Stand: Moderate assistance;Maximum assistance;Assist x2  Stand to Sit: Moderate assistance;Assist x2       Balance:  Sitting: Impaired; Without support  Sitting - Static: Good (unsupported)  Sitting - Dynamic: Fair (occasional)  Standing: Impaired; With support  Standing - Static: Fair      Pain:  Pain level pre-treatment: 7/10  Pain level post-treatment: 7/10   Pain Intervention(s): Medication (see MAR); Rest, Ice, Repositioning   Response to intervention: Nurse notified    Activity Tolerance:   Good    Please refer to the flowsheet for vital signs taken during this treatment. After treatment:   [] Patient left in no apparent distress sitting up in chair  [x] Patient left in no apparent distress in bed  [x] Call bell left within reach  [x] Nursing notified  [] Caregiver present  [x] Bed alarm activated  [] SCDs applied      COMMUNICATION/EDUCATION:   [x]         Role of Physical Therapy in the acute care setting. [x]         Fall prevention education was provided and the patient/caregiver indicated understanding. [x]         Patient/family have participated as able in working toward goals and plan of care. [x]         Patient/family agree to work toward stated goals and plan of care. []         Patient understands intent and goals of therapy, but is neutral about his/her participation. []         Patient is unable to participate in stated goals/plan of care: ongoing with therapy staff.  []         Other:        Shyam Vasquez   Time Calculation: 57 mins    [address] [place] [address] [address] [address] AM-PAC® Basic Mobility Inpatient Short Form (6-Clicks) Version 2    How much HELP from another person does the patient currently need    (If the patient hasn't done an activity recently, how much help from another person do you think he/she would need if he/she tried?)   Total (Total A or Dep)   A Lot  (Mod to Max A)   A Little (Sup or Min A)   None (Mod I to I)   Turning from your back to your side while in a flat bed without using bedrails? [] 1 [x] 2 [] 3 [] 4   2. Moving from lying on your back to sitting on the side of a flat bed without using bedrails? [] 1 [x] 2 [] 3 [] 4   3. Moving to and from a bed to a chair (including a wheelchair)? [] 1 [x] 2 [] 3 [] 4   4.  Standing up from a chair using your arms (e.g., wheelchair, or bedside chair)? [] 1 [x] 2 [] 3 [] 4   5. Walking in hospital room? [x] 1 [] 2 [] 3 [] 4   6. Climbing 3-5 steps with a railing?+   [x] 1 [] 2 [] 3 [] 4   +If stair climbing cannot be assessed, skip item #6. Sum responses from items 1-5. Current research shows that based on an AM-PAC score of 10/24 (9/20 if omitting stairs) and their current functional mobility deficits, it is recommended that the patient have 3-5 sessions per week of Physical Therapy at d/c to increase the patient's independence.

## 2022-08-04 ENCOUNTER — APPOINTMENT (OUTPATIENT)
Dept: CT IMAGING | Age: 67
DRG: 853 | End: 2022-08-04
Attending: INTERNAL MEDICINE
Payer: MEDICARE

## 2022-08-04 LAB
ANION GAP SERPL CALC-SCNC: 7 MMOL/L (ref 3–18)
BUN SERPL-MCNC: 17 MG/DL (ref 7–18)
BUN/CREAT SERPL: 16 (ref 12–20)
CALCIUM SERPL-MCNC: 8.8 MG/DL (ref 8.5–10.1)
CHLORIDE SERPL-SCNC: 110 MMOL/L (ref 100–111)
CO2 SERPL-SCNC: 26 MMOL/L (ref 21–32)
CREAT SERPL-MCNC: 1.06 MG/DL (ref 0.6–1.3)
GLUCOSE SERPL-MCNC: 178 MG/DL (ref 74–99)
POTASSIUM SERPL-SCNC: 4 MMOL/L (ref 3.5–5.5)
SODIUM SERPL-SCNC: 143 MMOL/L (ref 136–145)

## 2022-08-04 PROCEDURE — 65270000046 HC RM TELEMETRY

## 2022-08-04 PROCEDURE — 74011000636 HC RX REV CODE- 636: Performed by: HOSPITALIST

## 2022-08-04 PROCEDURE — 97116 GAIT TRAINING THERAPY: CPT

## 2022-08-04 PROCEDURE — 97530 THERAPEUTIC ACTIVITIES: CPT

## 2022-08-04 PROCEDURE — 77030040393 HC DRSG OPTIFOAM GENT MDII -B

## 2022-08-04 PROCEDURE — 99231 SBSQ HOSP IP/OBS SF/LOW 25: CPT | Performed by: SURGERY

## 2022-08-04 PROCEDURE — 74011250636 HC RX REV CODE- 250/636: Performed by: EMERGENCY MEDICINE

## 2022-08-04 PROCEDURE — 74011250637 HC RX REV CODE- 250/637: Performed by: INTERNAL MEDICINE

## 2022-08-04 PROCEDURE — 80048 BASIC METABOLIC PNL TOTAL CA: CPT

## 2022-08-04 PROCEDURE — 36415 COLL VENOUS BLD VENIPUNCTURE: CPT

## 2022-08-04 PROCEDURE — 74011000250 HC RX REV CODE- 250: Performed by: INTERNAL MEDICINE

## 2022-08-04 PROCEDURE — 74011250636 HC RX REV CODE- 250/636: Performed by: INTERNAL MEDICINE

## 2022-08-04 PROCEDURE — 97535 SELF CARE MNGMENT TRAINING: CPT

## 2022-08-04 PROCEDURE — 77030038269 HC DRN EXT URIN PURWCK BARD -A

## 2022-08-04 PROCEDURE — 2709999900 HC NON-CHARGEABLE SUPPLY

## 2022-08-04 PROCEDURE — 99232 SBSQ HOSP IP/OBS MODERATE 35: CPT | Performed by: HOSPITALIST

## 2022-08-04 PROCEDURE — 74177 CT ABD & PELVIS W/CONTRAST: CPT

## 2022-08-04 PROCEDURE — 74011000258 HC RX REV CODE- 258: Performed by: EMERGENCY MEDICINE

## 2022-08-04 PROCEDURE — 77030040392 HC DRSG OPTIFOAM MDII -A

## 2022-08-04 RX ORDER — CALCIUM CARB/MAGNESIUM CARB 311-232MG
5 TABLET ORAL
Status: DISCONTINUED | OUTPATIENT
Start: 2022-08-04 | End: 2022-08-17 | Stop reason: HOSPADM

## 2022-08-04 RX ADMIN — IOPAMIDOL 100 ML: 612 INJECTION, SOLUTION INTRAVENOUS at 09:00

## 2022-08-04 RX ADMIN — LEVOTHYROXINE SODIUM 88 MCG: 88 TABLET ORAL at 07:11

## 2022-08-04 RX ADMIN — PIPERACILLIN AND TAZOBACTAM 3.38 G: 3; .375 INJECTION, POWDER, FOR SOLUTION INTRAVENOUS at 16:15

## 2022-08-04 RX ADMIN — PYRIDOXINE HCL TAB 50 MG 200 MG: 50 TAB at 10:06

## 2022-08-04 RX ADMIN — Medication 2500 MCG: at 10:06

## 2022-08-04 RX ADMIN — SODIUM CHLORIDE, PRESERVATIVE FREE 10 ML: 5 INJECTION INTRAVENOUS at 13:59

## 2022-08-04 RX ADMIN — HYDRALAZINE HYDROCHLORIDE 25 MG: 50 TABLET, FILM COATED ORAL at 21:38

## 2022-08-04 RX ADMIN — DIVALPROEX SODIUM 500 MG: 500 TABLET, EXTENDED RELEASE ORAL at 10:06

## 2022-08-04 RX ADMIN — ACETAMINOPHEN 650 MG: 325 TABLET, FILM COATED ORAL at 07:11

## 2022-08-04 RX ADMIN — ACETAMINOPHEN 650 MG: 325 TABLET, FILM COATED ORAL at 15:52

## 2022-08-04 RX ADMIN — HYDRALAZINE HYDROCHLORIDE 25 MG: 50 TABLET, FILM COATED ORAL at 10:06

## 2022-08-04 RX ADMIN — GABAPENTIN 300 MG: 300 CAPSULE ORAL at 10:06

## 2022-08-04 RX ADMIN — GABAPENTIN 300 MG: 300 CAPSULE ORAL at 17:27

## 2022-08-04 RX ADMIN — PIPERACILLIN AND TAZOBACTAM 3.38 G: 3; .375 INJECTION, POWDER, FOR SOLUTION INTRAVENOUS at 01:05

## 2022-08-04 RX ADMIN — PIPERACILLIN AND TAZOBACTAM 3.38 G: 3; .375 INJECTION, POWDER, FOR SOLUTION INTRAVENOUS at 10:11

## 2022-08-04 RX ADMIN — AMLODIPINE BESYLATE 10 MG: 10 TABLET ORAL at 10:06

## 2022-08-04 RX ADMIN — HYDRALAZINE HYDROCHLORIDE 25 MG: 50 TABLET, FILM COATED ORAL at 15:53

## 2022-08-04 RX ADMIN — POLYETHYLENE GLYCOL 3350 17 G: 17 POWDER, FOR SOLUTION ORAL at 15:52

## 2022-08-04 RX ADMIN — SODIUM CHLORIDE, PRESERVATIVE FREE 10 ML: 5 INJECTION INTRAVENOUS at 21:38

## 2022-08-04 RX ADMIN — DEXAMETHASONE 2 MG: 2 TABLET ORAL at 10:06

## 2022-08-04 RX ADMIN — SODIUM CHLORIDE, PRESERVATIVE FREE 10 ML: 5 INJECTION INTRAVENOUS at 10:17

## 2022-08-04 RX ADMIN — DEXAMETHASONE 2 MG: 2 TABLET ORAL at 21:38

## 2022-08-04 NOTE — PROGRESS NOTES
Infectious Disease progress Note        Reason: Gram-negative bloodstream infection    Current abx Prior abx   Zosyn since 8/1/2022  levofloxacin, vancomycin 8/1-8/3     Lines:       Assessment :     74 y.o. female with PMHx significant for htn, seizures, breast cancer, recently diagnosed glioblastoma with vasogenic edema (7/2022), recent hospitalization at [facility] who presented to [facility] ER on 8/1/22 with complaint of Left Foot Blister and Left Ankle Pain. [facility] AT [place] 7/1-7/4 for fall, diagnosed to have glioblastoma multiforme with mass effect s/p decadron    Hospitalization at [address] [facility] 7/14/22-7/22/22 for fall, AMS (declined hospice during that admission)    Clinical presentation concerning for sepsis-present on admission due to Bacteroides bloodstream infection. Exact source of Bacteroides bloodstream infection not entirely clear at this time. Bacteroides usually originates from GI tract or necrotic soft tissue infection. No evidence of GI infection noted clinically or per CT scan 8/4/2022  significant necrotic lesion in the back. CT scan reveals 4.8 x 3.1 cm fluid collection in the right lateral lumbar region- ?  Lumbar soft tissue abscess as a source of Bacteroides bloodstream infection. Immunocompromise state due to concurrent steroids can mask the full clinical presentation    Necrotic lower back ulcers, pressure necrosis right heel-likely due to prolonged period of unresponsiveness/pressure injury July 2022    Left foot blister, left leg ulcer-no clinical evidence of infection noted on today's exam.  Wound culture 8/1/2022-gram-negative rods, Staph aureus-likely colonizer    Glioblastoma multiforme-plans for outpatient neurosurgery noted. Currently on Decadron    Recommendations:    Continue piperacillin/tazobactam.    Follow up repeat blood culture today to determine clearance of bloodstream infection  Obtain surgery evaluation for lumbar soft tissue fluid collection- ?   Need for drainage   Follow-up psych recommendations  Continue local wound care left leg ulcer, pressure offloading right heel, wound care back ulcers     Above plan was discussed in details with patient, RN and dr Mehreen Estrada Please call me if any further questions or concerns. Will continue to participate in the care of this patient. HPI:    Feels better. She denies any worsening back pain at the site of back ulcer. Denies any abdominal pain, diarrhea, constipation, nausea, vomiting. 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    Cancer ([address] 01/01/2014    breast    Ductal carcinoma in situ of breast     Functional quadriplegia ([facility] [address]     as of 7/2022    Glaucoma     History of seizure disorder     Open back wound 07/2022    Pressure wound for lying on a table for indeterminate amount of time    Pituitary adenoma with extrasellar extension ([facility] [address]     Primary hypertension        Past Surgical History:   Procedure Laterality Date    HX HYSTERECTOMY      partial    HX MASTECTOMY  4/30/2014    RIGHT PARTIAL MASTECTOMY WITH NEEDLE LOCALIZATION MAMMOGRAM SENTINEL LYMPH NODE BIOPSY performed by Beth Chaudhari MD at [address] [facility] Medication List      Details   amLODIPine (NORVASC) 10 mg tablet Take 10 mg by mouth in the morning. dexAMETHasone (DECADRON) 2 mg tablet Take  by mouth every twelve (12) hours. levothyroxine (SYNTHROID) 88 mcg tablet Take 88 mcg by mouth Daily (before breakfast). divalproex ER (DEPAKOTE ER) 500 mg ER tablet Take 500 mg by mouth. hydrALAZINE (APRESOLINE) 25 mg tablet Take 25 mg by mouth three (3) times daily. gabapentin (NEURONTIN) 300 mg capsule Take 1 tab PO QHS x1 week then increase to BID thereafter  Qty: 60 Cap, Refills: 1      baclofen (LIORESAL) 10 mg tablet Take 1 Tab by mouth three (3) times daily.   Qty: 90 Tab, Refills: 0      tamoxifen (NOLVADEX) 10 mg tablet Take  by mouth daily. oxyCODONE-acetaminophen (PERCOCET) 5-325 mg per tablet 1 or 2 tablets by mouth every 4 hours as needed  Qty: 40 Tab, Refills: 0      !! OTHER Vitamin D Po      nebivoloL (BYSTOLIC) 20 mg tablet Take  by mouth daily. torsemide (DEMADEX) 20 mg tablet Take  by mouth daily. !! OTHER Occuvite eye vitamin OTC      cyanocobalamin (VITAMIN B12) 2,500 mcg sublingual tablet Take 2,500 mcg by mouth daily. pyridoxine, vitamin B6, 200 mg tablet Take 200 mg by mouth daily. BIOTIN PO Take 1,000 mg by mouth. diazePAM (VALIUM) 10 mg tablet TAKE 1 TAB PO 30 MINS PRIOR TO MRI (MUST HAVE SOMEONE TO DRIVE TO AND FROM THE FACILITY)  Qty: 1 Tab, Refills: 0    Associated Diagnoses: Lumbar radiculitis; Lumbar spondylosis      diclofenac (VOLTAREN) 1 % gel QID to affected areas PRN for pain  Qty: 500 g, Refills: 5       !! - Potential duplicate medications found. Please discuss with provider.           Current Facility-Administered Medications   Medication Dose Route Frequency    melatonin (rapid dissolve) tablet 5 mg  5 mg Oral QHS PRN    sodium chloride (NS) flush 5-10 mL  5-10 mL IntraVENous PRN    piperacillin-tazobactam (ZOSYN) 3.375 g in 0.9% sodium chloride (MBP/ADV) 100 mL MBP  3.375 g IntraVENous Q8H    sodium chloride (NS) flush 5-40 mL  5-40 mL IntraVENous Q8H    sodium chloride (NS) flush 5-40 mL  5-40 mL IntraVENous PRN    acetaminophen (TYLENOL) tablet 650 mg  650 mg Oral Q6H PRN    Or    acetaminophen (TYLENOL) suppository 650 mg  650 mg Rectal Q6H PRN    polyethylene glycol (MIRALAX) packet 17 g  17 g Oral DAILY PRN    ondansetron (ZOFRAN ODT) tablet 4 mg  4 mg Oral Q8H PRN    Or    ondansetron (ZOFRAN) injection 4 mg  4 mg IntraVENous Q6H PRN    albuterol-ipratropium (DUO-NEB) 2.5 MG-0.5 MG/3 ML  3 mL Nebulization Q6H PRN    nitroglycerin (NITROBID) 2 % ointment 0.5 Inch  0.5 Inch Topical Q6H PRN    naloxone (NARCAN) injection 0.4 mg  0.4 mg IntraVENous EVERY 2 MINUTES AS NEEDED    amLODIPine (NORVASC) tablet 10 mg  10 mg Oral DAILY    cyanocobalamin (VITAMIN B12) sublingual tablet 2,500 mcg  2,500 mcg Oral DAILY    dexAMETHasone (DECADRON) tablet 2 mg  2 mg Oral Q12H    divalproex ER (DEPAKOTE ER) 24 hour tablet 500 mg  500 mg Oral DAILY    gabapentin (NEURONTIN) capsule 300 mg  300 mg Oral BID    hydrALAZINE (APRESOLINE) tablet 25 mg  25 mg Oral TID    levothyroxine (SYNTHROID) tablet 88 mcg  88 mcg Oral 6am    atenoloL (TENORMIN) tablet 100 mg  100 mg Oral DAILY    oxyCODONE-acetaminophen (PERCOCET) 5-325 mg per tablet 1 Tablet  1 Tablet Oral Q4H PRN    pyridoxine (vitamin B6) (VITAMIN B-6) tablet 200 mg  200 mg Oral DAILY       Allergies: Patient has no known allergies.     Family History   Problem Relation Age of Onset    Colon Cancer Mother      Social History     Socioeconomic History    Marital status:      Spouse name: Not on file    Number of children: Not on file    Years of education: Not on file    Highest education level: Not on file   Occupational History    Not on file   Tobacco Use    Smoking status: Never    Smokeless tobacco: Never   Substance and Sexual Activity    Alcohol use: No     Alcohol/week: 0.0 standard drinks    Drug use: No    Sexual activity: Not on file   Other Topics Concern    Not on file   Social History Narrative    Not on file     Social Determinants of Health     Financial Resource Strain: Not on file   Food Insecurity: Not on file   Transportation Needs: Not on file   Physical Activity: Not on file   Stress: Not on file   Social Connections: Not on file   Intimate Partner Violence: Not on file   Housing Stability: Not on file     Social History     Tobacco Use   Smoking Status Never   Smokeless Tobacco Never        Temp (24hrs), Av.9 °F (36.6 °C), Min:97.5 °F (36.4 °C), Max:98.5 °F (36.9 °C)    Visit Vitals  BP (!) 154/75   Pulse 63   Temp 97.5 °F (36.4 °C)   Resp 18   Ht 5' 3\" (1.6 m)   Wt 83.3 kg (183 lb 9.6 oz)   SpO2 99%   BMI 32.52 kg/m²       ROS: 12 point ROS obtained in details. Pertinent positives as mentioned in HPI,   otherwise negative    Physical Exam:    General:  female patient laying on the bed AAOx3 in no acute distress. General:   awake alert and oriented   HEENT:  Normocephalic, atraumatic, right eye whitish cornea,  no scleral icterus or pallor; no conjunctival hemmohage;  nasal and oral mucous are moist and without evidence of lesions. Dentition good. Neck supple, no bruits. Lymph Nodes:   Not examined   Lungs:   non-labored, bilateral chest movements equal, no audible wheezing   Heart:  RRR, s1 and s2; no  rubs or gallops, no edema   Abdomen:  soft, non-distended,  no hepatomegaly, no splenomegaly. Non-tender   Genitourinary:  No galvez   Extremities:   no clubbing, cyanosis; superficial pressure necrosis right heel, blister on dorsal aspect of left foot-no surrounding erythema. Superficial ulceration medial aspect of right leg with red granulation tissue-no purulent drainage/no surrounding erythema, muscle mass appropriate for age   Neurologic:  No gross focal sensory abnormalities; 5/5 muscle strength to upper and lower extremities. Speech appropriate. Cranial nerves intact                        Skin:  Skin changes bilateral lower extremity as mentioned above.   Linear ulceration lumbar region with dry necrotic tissue-no surrounding erythema, no purulent drainage; dry necrotic ulcer upper back; superficial ulcer right buttock- no drainage   Back:  no spinal or paraspinal muscle tenderness or rigidity, no CVA tenderness; skin changes as mentioned above     Psychiatric:  No suicidal or homicidal ideations, appropriate mood and affect         Labs: Results:   Chemistry Recent Labs     08/04/22  0231 08/02/22  0259 08/01/22  1414   * 104* 109*    144 140   K 4.0 3.6 3.4*    108 103   CO2 26 28 31   BUN 17 16 16   CREA 1.06 1.01 1.00   CA 8.8 8.6 9.3   AGAP 7 8 6   BUCR 16 16 16   AP  --  95 123*   TP  --  6.2* 6.6   ALB  --  2.3* 2.9*   GLOB  --  3.9 3.7   AGRAT  --  0.6* 0.8        CBC w/Diff Recent Labs     08/02/22  0821 08/02/22  0259 08/01/22  1414   WBC  --  7.8 16.5*   RBC  --  4.19* 4.06*   HGB  --  12.6 12.2   HCT  --  38.8 36.5   * UNABLE TO REPORT ACCURATE COUNT DUE TO PLATELET AGGREGATION, HOWEVER, PLATELETS APPEAR NORMAL IN NUMBER ON SMEAR. PLEASE RESUBMIT SODIUM CITRATE (BLUE) AND EDTA (LAVENDER) TUBES FOR HEMATOLOGICAL TESTING. 232   GRANS  --  80* 78*   LYMPH  --  15* 14*   EOS  --  1 0        Microbiology Recent Labs     08/03/22  0736 08/01/22  1731 08/01/22  1430 08/01/22  1414   CULT NO GROWTH AFTER 22 HOURS HEAVY GRAM NEGATIVE RODS*  HEAVY POSSIBLE 2ND GRAM NEGATIVE ECTOR*  MODERATE  POSSIBLE  3RD GRAM NEGATIVE ECTOR  *  HEAVY PROBABLE STAPHYLOCOCCUS AUREUS* BACTEROIDES FRAGILIS GROWING IN 1 OF 2 BOTTLES DRAWN No Site Indicated BETA LACTAMASE POSITIVE*  REMAINING BOTTLE(S) HAS/HAVE NO GROWTH SO FAR NO GROWTH 3 DAYS            RADIOLOGY:    All available imaging studies/reports in [facility] care for this admission were reviewed    Total time spent >35 minutes. High complexity decision making was performed during the evaluation of this patient at high risk for decompensation     Above mentioned total time spent on reviewing the case/medical record/data/notes/EMR/patient examination/documentation/coordinating care with nurse/consultants, exclusive of procedures with complex decision making performed and > 50% time spent in face to face evaluation. Disclaimer: Sections of this note are dictated utilizing voice recognition software, which may have resulted in some phonetic based errors in grammar and contents. Even though attempts were made to correct all the mistakes, some may have been missed, and remained in the body of the document. If questions arise, please contact our department.     Dr. Kayla Brown, Infectious Disease [facility]  [phone]  August 4, 2022  7:03 AM

## 2022-08-04 NOTE — PROGRESS NOTES
Bedside and Verbal shift change report given to [address] (oncoming nurse) by Antione Luna RN (offgoing nurse). Report included the following information SBAR, Kardex, Intake/Output, MAR, and Recent Results.

## 2022-08-04 NOTE — PROGRESS NOTES
Problem: Falls - Risk of  Goal: *Absence of Falls  Description: Document Jacktruman Schmitt Fall Risk and appropriate interventions in the flowsheet. Outcome: Progressing Towards Goal  Note: Fall Risk Interventions:  Mobility Interventions: Bed/chair exit alarm, PT Consult for mobility concerns    Medication Interventions: Bed/chair exit alarm    Elimination Interventions: Call light in reach, Bed/chair exit alarm, Toileting schedule/hourly rounds       Problem: Pressure Injury - Risk of  Goal: *Prevention of pressure injury  Description: Document Bret Scale and appropriate interventions in the flowsheet.   Outcome: Progressing Towards Goal  Note: Pressure Injury Interventions:  Sensory Interventions: Keep linens dry and wrinkle-free, Maintain/enhance activity level, Minimize linen layers, Assess changes in LOC    Moisture Interventions: Absorbent underpads, Internal/External urinary devices    Activity Interventions: Pressure redistribution bed/mattress(bed type)    Mobility Interventions: HOB 30 degrees or less    Nutrition Interventions: Document food/fluid/supplement intake    Friction and Shear Interventions: HOB 30 degrees or less, Minimize layers

## 2022-08-04 NOTE — PROGRESS NOTES
Problem: Mobility Impaired (Adult and Pediatric)  Goal: *Acute Goals and Plan of Care (Insert Text)  Description: Physical Therapy Goals  Initiated 8/2/2022 and to be accomplished within 7 day(s)  1. Patient will move from supine to sit and sit to supine , scoot up and down, and roll side to side in bed with minimal assistance/contact guard assist.    2.  Patient will transfer from bed to chair and chair to bed with minimal assistance/contact guard assist using the least restrictive device. 3.  Patient will perform sit to stand with minimal assistance/contact guard assist.  4.  Patient will ambulate with minimal assistance/contact guard assist for 25 feet with the least restrictive device. 5.  Patient will ascend/descend stairs  as needed. 6.  Patient will perform BLE therex as prescribed    PLOF: Pt lives alone, reports she has a rollator, receives some assist from brothers      Outcome: Progressing Towards Goal   PHYSICAL THERAPY TREATMENT    Patient: Anish Ly (53 y.o. female)  Date: 8/4/2022  Diagnosis: Sepsis ([facility] [address] [A41.9]  Leukocytosis [D72.829]  Open wound of lower back [S31.000A] Severe sepsis ([facility] [address]      Precautions: Fall, Skin    ASSESSMENT:  Pt cleared to participate in PT session, pt received semi-reclined in bed and agreeable to therapy session. Completing with OT to maximize safety and mobility. Pt continues to report in in LLE, but agreeable to attempt OOB to chair. Pt able to recall steps for transition to EOB, improved sequencing and speed for transfer. Able to complete with SBA, HOB elevated and use of bedrails. Pt then sitting on EOB with good balance, assisted with donning sock on L foot. Standing with modA, at first with posterior lean. Then pivoting to recliner with Belia and RW. Pt positioned for comfort and educated to call for assist before getting up, pt verbalized understanding. Pt left with all needs met and call bell in reach. RN notified of position and participation. Progression toward goals:   []      Improving appropriately and progressing toward goals  [x]      Improving slowly and progressing toward goals  []      Not making progress toward goals and plan of care will be adjusted     PLAN:  Patient continues to benefit from skilled intervention to address the above impairments. Continue treatment per established plan of care. Further Equipment Recommendations for Discharge:  rolling walker    AMPAC: Based on an AM-PAC score of 15/24 and their current functional mobility deficits, it is recommended that the patient have 3-5 sessions per week of Physical Therapy at d/c to increase the patient's independence. This AMPAC score should be considered in conjunction with interdisciplinary team recommendations to determine the most appropriate discharge setting. Patient's social support, diagnosis, medical stability, and prior level of function should also be taken into consideration. SUBJECTIVE:   Patient stated We need to slow down.     OBJECTIVE DATA SUMMARY:   Critical Behavior:  Neurologic State: Alert  Orientation Level: Oriented X4  Cognition: Follows commands  Safety/Judgement: Fall prevention, Lack of insight into deficits  Functional Mobility Training:  Bed Mobility:     Supine to Sit: Stand-by assistance     Scooting: Stand-by assistance    Transfers:  Sit to Stand: Moderate assistance  Stand to Sit: Minimum assistance  Stand Pivot Transfers: Minimum assistance       Balance:  Sitting: Intact  Sitting - Static: Good (unsupported)  Sitting - Dynamic: Good (unsupported)  Standing: Impaired; With support  Standing - Static: Fair  Standing - Dynamic : Fair      Pain:  Pain level pre-treatment: 8/10  Pain level post-treatment: 8/10   Pain Intervention(s): Rest, Repositioning  Response to intervention: Nurse notified    Activity Tolerance:   Fair tolerance, limited by pain       Please refer to the flowsheet for vital signs taken during this treatment.   After treatment:   [x] Patient left in no apparent distress sitting up in chair  [] Patient left in no apparent distress in bed  [x] Call bell left within reach  [x] Nursing notified  [] Caregiver present  [] Bed alarm activated  [] SCDs applied      COMMUNICATION/EDUCATION:   [x]         Role of Physical Therapy in the acute care setting. [x]         Fall prevention education was provided and the patient/caregiver indicated understanding. [x]         Patient/family have participated as able in working toward goals and plan of care. [x]         Patient/family agree to work toward stated goals and plan of care. []         Patient understands intent and goals of therapy, but is neutral about his/her participation. []         Patient is unable to participate in stated goals/plan of care: ongoing with therapy staff.  []         Other:        Aditi Moss, PT   Time Calculation: 28 mins    [address] [place] [id number] AM-PAC® Basic Mobility Inpatient Short Form (6-Clicks) Version 2    How much HELP from another person does the patient currently need    (If the patient hasn't done an activity recently, how much help from another person do you think he/she would need if he/she tried?)   Total (Total A or Dep)   A Lot  (Mod to Max A)   A Little (Sup or Min A)   None (Mod I to I)   Turning from your back to your side while in a flat bed without using bedrails? [] 1 [] 2 [x] 3 [] 4   2. Moving from lying on your back to sitting on the side of a flat bed without using bedrails? [] 1 [] 2 [x] 3 [] 4   3. Moving to and from a bed to a chair (including a wheelchair)? [] 1 [] 2 [x] 3 [] 4   4. Standing up from a chair using your arms (e.g., wheelchair, or bedside chair)? [] 1 [x] 2 [] 3 [] 4   5. Walking in hospital room? [] 1 [x] 2 [] 3 [] 4   6. Climbing 3-5 steps with a railing?+   [] 1 [x] 2 [] 3 [] 4   +If stair climbing cannot be assessed, skip item #6. Sum responses from items 1-5.

## 2022-08-04 NOTE — PROGRESS NOTES
Comprehensive Nutrition Assessment    Type and Reason for Visit: Initial, Positive nutrition screen, Wound    Nutrition Recommendations/Plan:   Recommend modifying current supplement order to Ensure drink BID to optimize PO intake r/t pt's request and reports of decreased intake PTA. Recommend adding Gelatein supplement BID to aid wound healing- [employer] currently out of stock, will change order when back in stock. Recommend updating diet order with food preferences- described below. Monitor intake/tolerance of meals/supplements, weight, labs, and POC. Malnutrition Assessment:  Malnutrition Status: At risk for malnutrition (specify) (increased nutrient needs r/t wounds and reports of poor intake PTA) (08/04/22 1229)      Nutrition History and Allergies:   PMHx of brain mass (pt reports she has neurosurgeon visit scheduled), per H&P), anemia, breast cancer s/p mastectomy, hypothyroidism, functional quadriplegia, hx of seizure disorder, HTN. Pt came in C/O left foot blister and left ankle pain. Sepsis identified. Wt Hx reviewed: current wt- 183 lb 9.6 oz (no source); >3 years (6/26/19)- 197 lb (-6.8 %). Wt hx does not show more recent weights. Nutrition Assessment:    Pt with multiple wounds. Wt hx does not show more recent weights- Insignificant wt loss x 3 years. No PO intake documented. Spoke to pt- very pleasant. Denied recent unintentional wt loss, though could not report usual wt. Reported decreased appetite for the past two years (would not finish meals) but that is has picked up recently and she is finishing meals inpatient. Pt drinks Ensure/Boost at home and is requests having it while inpatient- expressed flavor preferences. PT expressed meal preferences as well- gingerale, tomato soup, salads, fruit, chicken/tuna/egg salads. Nutrition Related Findings:    BM 8/3. Labs reviewed. Pertinent meds- cyanocobalamin, Synthroid, Zofran, Miralax, Pyridoxine.  Wound Type: Multiple, Pressure injury, Unstageable (multiple blisters and pressure injuries)    Current Nutrition Intake & Therapies:  Average Meal Intake: %  Average Supplement Intake: Unable to assess  ADULT DIET Regular; Low Fat/Low Chol/High Fiber/MILAGRO; Low Sodium (2 gm); 2000 ml  ADULT ORAL NUTRITION SUPPLEMENT HS Snack, PM Snack; Snack; Whatever you got    Anthropometric Measures:  Height: 5' 3\" (160 cm)  Ideal Body Weight (IBW): 115 lbs (52 kg)  Admission Body Weight: 173 lb 4.8 oz (bed scale)  Current Body Wt:  83.3 kg (183 lb 9.6 oz), 159.7 % IBW. Not specified  Current BMI (kg/m2): 32.5  Usual Body Weight: 89.4 kg (197 lb) (6/26/19- pt unable to tell usual wt)  % Weight Change (Calculated): -6.8  Weight Adjustment: No adjustment                 BMI Category: Obese class 1 (BMI 30.0-34. 9)    Estimated Daily Nutrient Needs:  Energy Requirements Based On: Kcal/kg (25-30)  Weight Used for Energy Requirements: Current  Energy (kcal/day): 2083 - 2499  Weight Used for Protein Requirements: Current (1-1.2)  Protein (g/day):   Method Used for Fluid Requirements: Other (comment) (restriced to 2000 L per diet order)  Fluid (ml/day): 2083 - 2499    Nutrition Diagnosis:   Increased nutrient needs related to increased demand for energy/nutrients as evidenced by wounds  Inadequate oral intake related to inadequate protein-energy intake as evidenced by poor intake prior to admission    Nutrition Interventions:   Food and/or Nutrient Delivery: Continue current diet, Start oral nutrition supplement, Modify oral nutrition supplement  Nutrition Education/Counseling: No recommendations at this time, Education not indicated  Coordination of Nutrition Care: Continue to monitor while inpatient  Plan of Care discussed with: Patient    Goals:     Goals: Meet at least 75% of estimated needs, by next RD assessment, PO intake 75% or greater       Nutrition Monitoring and Evaluation:   Behavioral-Environmental Outcomes: None identified  Food/Nutrient Intake Outcomes: Diet advancement/tolerance, Food and nutrient intake, Supplement intake  Physical Signs/Symptoms Outcomes: Biochemical data, Meal time behavior, GI status, Weight, Skin    Discharge Planning:    Continue current diet, Continue oral nutrition supplement    Venancio Asif  Contact: [phone]

## 2022-08-04 NOTE — PROGRESS NOTES
Problem: Mobility Impaired (Adult and Pediatric)  Goal: *Acute Goals and Plan of Care (Insert Text)  Description: Physical Therapy Goals  Initiated 8/2/2022 and to be accomplished within 7 day(s)  1. Patient will move from supine to sit and sit to supine , scoot up and down, and roll side to side in bed with minimal assistance/contact guard assist.    2.  Patient will transfer from bed to chair and chair to bed with minimal assistance/contact guard assist using the least restrictive device. 3.  Patient will perform sit to stand with minimal assistance/contact guard assist.  4.  Patient will ambulate with minimal assistance/contact guard assist for 25 feet with the least restrictive device. 5.  Patient will ascend/descend stairs  as needed. 6.  Patient will perform BLE therex as prescribed    PLOF: Pt lives alone, reports she has a rollator, receives some assist from brothers      8/4/2022 1600 by Angelica Schilder, PT  Outcome: Progressing Towards Goal  8/4/2022 1417 by Angelica Schilder, PT  Outcome: Progressing Towards Goal   PHYSICAL THERAPY TREATMENT    Patient: Richard Lester (70 y.o. female)  Date: 8/4/2022  Diagnosis: Sepsis ([facility] [address] [A41.9]  Leukocytosis [D72.829]  Open wound of lower back [S31.000A] Severe sepsis ([address]      Precautions: Fall, Skin    ASSESSMENT:  Pt cleared to participate in PT session, pt received sitting up in chair and agreeable to therapy session. Pt continues to report LLE pain, 8/10 during session. RN present for assist as needed. Standing with cuing and modA from recliner. Fair balance in standing. Able to take several side steps with RW and Belia to bed. ModA for sit to supine for BLE management. Scooting towards HOB with totalA. Pt positioned for comfort and educated to call for assist before getting up, pt verbalized understanding. Pt left with all needs met and call bell in reach. RN notified of position and participation.      Progression toward goals:   [] Improving appropriately and progressing toward goals  [x]      Improving slowly and progressing toward goals  []      Not making progress toward goals and plan of care will be adjusted     PLAN:  Patient continues to benefit from skilled intervention to address the above impairments. Continue treatment per established plan of care. Further Equipment Recommendations for Discharge:  rolling walker    AMPAC: Based on an AM-PAC score of 15/24 and their current functional mobility deficits, it is recommended that the patient have 3-5 sessions per week of Physical Therapy at d/c to increase the patient's independence. This AMPAC score should be considered in conjunction with interdisciplinary team recommendations to determine the most appropriate discharge setting. Patient's social support, diagnosis, medical stability, and prior level of function should also be taken into consideration. SUBJECTIVE:   Patient stated You told me to stay up for 2 hours.     OBJECTIVE DATA SUMMARY:   Critical Behavior:  Neurologic State: Alert  Orientation Level: Oriented X4  Cognition: Follows commands  Safety/Judgement: Fall prevention, Lack of insight into deficits  Functional Mobility Training:  Bed Mobility:     Supine to Sit: Stand-by assistance  Sit to Supine: Moderate assistance (BLEs management)  Scooting: Total assistance (in supine)    Transfers:  Sit to Stand: Moderate assistance  Stand to Sit: Contact guard assistance  Stand Pivot Transfers: Minimum assistance       Balance:  Sitting: Intact  Sitting - Static: Good (unsupported)  Sitting - Dynamic: Good (unsupported)  Standing: Impaired; With support  Standing - Static: Fair  Standing - Dynamic : Fair      Posture:   Posture (WDL): Within defined limits      Pain:  Pain level pre-treatment: 0/10  Pain level post-treatment: 0/10     Activity Tolerance:   Improving     Please refer to the flowsheet for vital signs taken during this treatment.   After treatment:   [] Patient left in no apparent distress sitting up in chair  [x] Patient left in no apparent distress in bed  [x] Call bell left within reach  [x] Nursing notified  [] Caregiver present  [] Bed alarm activated  [] SCDs applied      COMMUNICATION/EDUCATION:   [x]         Role of Physical Therapy in the acute care setting. [x]         Fall prevention education was provided and the patient/caregiver indicated understanding. [x]         Patient/family have participated as able in working toward goals and plan of care. [x]         Patient/family agree to work toward stated goals and plan of care. []         Patient understands intent and goals of therapy, but is neutral about his/her participation. []         Patient is unable to participate in stated goals/plan of care: ongoing with therapy staff.  []         Other:        Anitha Pearson, PT   Time Calculation: 25 mins    [facility] AM-PAC® Basic Mobility Inpatient Short Form (6-Clicks) Version 2    How much HELP from another person does the patient currently need    (If the patient hasn't done an activity recently, how much help from another person do you think he/she would need if he/she tried?)   Total (Total A or Dep)   A Lot  (Mod to Max A)   A Little (Sup or Min A)   None (Mod I to I)   Turning from your back to your side while in a flat bed without using bedrails? [] 1 [] 2 [x] 3 [] 4   2. Moving from lying on your back to sitting on the side of a flat bed without using bedrails? [] 1 [] 2 [x] 3 [] 4   3. Moving to and from a bed to a chair (including a wheelchair)? [] 1 [] 2 [x] 3 [] 4   4. Standing up from a chair using your arms (e.g., wheelchair, or bedside chair)? [] 1 [x] 2 [] 3 [] 4   5. Walking in hospital room? [] 1 [x] 2 [] 3 [] 4   6. Climbing 3-5 steps with a railing?+   [] 1 [x] 2 [] 3 [] 4   +If stair climbing cannot be assessed, skip item #6. Sum responses from items 1-5.

## 2022-08-04 NOTE — CONSULTS
History & Physical    Date: 8/4/2022    Referring Physician: Dr. Folyd Emerson    Assessment:    Principal Problem:    Severe sepsis ([facility] [address] (8/1/2022)    Active Problems:    History of breast cancer (5/16/2016)      Open wound of lower back (8/1/2022)      Wound of left leg (8/1/2022)      Glaucoma (8/1/2022)      Primary hypertension (8/1/2022)      Acquired hypothyroidism (8/1/2022)      Obesity (BMI 30.0-34.9) (8/1/2022)      Overview: BMI 30.70 on 8/01/2022      Impaired mobility and ADLs (8/1/2022)      Overview: Patient is mobile with use of Rolator and has significant debility. Plan:   I have discussed the above findings with Ms. Silvia Terrell she has infected back wound. The area needs cultured and we can do this tomorrow when we bedside debride if this has not already been done. She needs an offloading mattress, frequent turning and evaluation by wound care. Patient agrees to bedside debridement tomorrow. Continue ongoing medical management per primary team    History of Present Illness:  Ms. Juan Simpson is a 74y.o. year old female who presented with left foot blister and left ankle pain. She does have chronic wounds on her lower back and recent diagnosis of glioblastoma. Patient states she lives at home and has people come check on her back.  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    Cancer ([address] 01/01/2014    breast    Ductal carcinoma in situ of breast     Functional quadriplegia ([address]     as of 7/2022    Glaucoma     History of seizure disorder     Open back wound 07/2022    Pressure wound for lying on a table for indeterminate amount of time    Pituitary adenoma with extrasellar extension ([address]     Primary hypertension      Past Surgical History:   Procedure Laterality Date    HX HYSTERECTOMY      partial    HX MASTECTOMY 4/30/2014    RIGHT PARTIAL MASTECTOMY WITH NEEDLE LOCALIZATION MAMMOGRAM SENTINEL LYMPH NODE BIOPSY performed by Jaye Frost MD at [facility] MAIN OR     Family History   Problem Relation Age of Onset    Colon Cancer Mother      Social History     Socioeconomic History    Marital status:      Spouse name: Not on file    Number of children: Not on file    Years of education: Not on file    Highest education level: Not on file   Occupational History    Not on file   Tobacco Use    Smoking status: Never    Smokeless tobacco: Never   Substance and Sexual Activity    Alcohol use: No     Alcohol/week: 0.0 standard drinks    Drug use: No    Sexual activity: Not on file   Other Topics Concern    Not on file   Social History Narrative    Not on file     Social Determinants of Health     Financial Resource Strain: Not on file   Food Insecurity: Not on file   Transportation Needs: Not on file   Physical Activity: Not on file   Stress: Not on file   Social Connections: Not on file   Intimate Partner Violence: Not on file   Housing Stability: Not on file     No Known Allergies    Medications:  No current facility-administered medications on file prior to encounter. Current Outpatient Medications on File Prior to Encounter   Medication Sig Dispense Refill    amLODIPine (NORVASC) 10 mg tablet Take 10 mg by mouth in the morning. dexAMETHasone (DECADRON) 2 mg tablet Take  by mouth every twelve (12) hours. levothyroxine (SYNTHROID) 88 mcg tablet Take 88 mcg by mouth Daily (before breakfast). divalproex ER (DEPAKOTE ER) 500 mg ER tablet Take 500 mg by mouth. hydrALAZINE (APRESOLINE) 25 mg tablet Take 25 mg by mouth three (3) times daily. gabapentin (NEURONTIN) 300 mg capsule Take 1 tab PO QHS x1 week then increase to BID thereafter 60 Cap 1    baclofen (LIORESAL) 10 mg tablet Take 1 Tab by mouth three (3) times daily. 90 Tab 0    tamoxifen (NOLVADEX) 10 mg tablet Take  by mouth daily. oxyCODONE-acetaminophen (PERCOCET) 5-325 mg per tablet 1 or 2 tablets by mouth every 4 hours as needed 40 Tab 0    OTHER Vitamin D Po      nebivoloL (BYSTOLIC) 20 mg tablet Take  by mouth daily. torsemide (DEMADEX) 20 mg tablet Take  by mouth daily. OTHER Occuvite eye vitamin OTC      cyanocobalamin (VITAMIN B12) 2,500 mcg sublingual tablet Take 2,500 mcg by mouth daily. pyridoxine, vitamin B6, 200 mg tablet Take 200 mg by mouth daily. BIOTIN PO Take 1,000 mg by mouth. diazePAM (VALIUM) 10 mg tablet TAKE 1 TAB PO 30 MINS PRIOR TO MRI (MUST HAVE SOMEONE TO DRIVE TO AND FROM THE FACILITY) 1 Tab 0    diclofenac (VOLTAREN) 1 % gel QID to affected areas PRN for pain 500 g 5       Review of Systems:  Review of Systems   Constitutional:  Positive for fatigue. Negative for fever. Musculoskeletal:  Negative for back pain. Physical Exam:  Patient Vitals for the past 24 hrs:   BP Temp Pulse Resp SpO2 Height   08/04/22 1602 (!) 110/57 97.4 °F (36.3 °C) 68 17 99 % --   08/04/22 1201 -- -- -- -- -- 5' 3\" (1.6 m)   08/04/22 1200 137/76 97.4 °F (36.3 °C) 64 16 100 % --   08/04/22 0800 130/76 97.4 °F (36.3 °C) (!) 55 16 100 % --   08/04/22 0412 (!) 154/75 97.5 °F (36.4 °C) 63 18 99 % --   08/04/22 0115 134/89 97.7 °F (36.5 °C) 60 18 100 % --   08/03/22 2035 122/73 98.5 °F (36.9 °C) 66 18 100 % --       Physical Exam  Skin:     General: Skin is warm and dry. Comments: Lower back soft eschar, non tender, no periwound erythema, juanjose pus expressed from inferior pole of wound when pressure applied. Neurological:      Mental Status: She is alert.    Psychiatric:         Mood and Affect: Mood normal.       Laboratory Data:  Recent Results (from the past 2016 hour(s))   URINALYSIS W/ RFLX MICROSCOPIC    Collection Time: 08/01/22  2:00 PM   Result Value Ref Range    Color DARK YELLOW      Appearance CLEAR      Specific gravity 1.019 1.005 - 1.030      pH (UA) 5.5 5.0 - 8.0      Protein Negative NEG mg/dL    Glucose Negative NEG mg/dL    Ketone Negative NEG mg/dL    Bilirubin SMALL (A) NEG      Blood Negative NEG      Urobilinogen 1.0 0.2 - 1.0 EU/dL    Nitrites Negative NEG      Leukocyte Esterase TRACE (A) NEG     URINE MICROSCOPIC ONLY    Collection Time: 08/01/22  2:00 PM   Result Value Ref Range    WBC 0 to 3 0 - 4 /hpf    RBC NONE 0 - 5 /hpf    Epithelial cells FEW 0 - 5 /lpf    Bacteria FEW (A) NEG /hpf   CBC WITH AUTOMATED DIFF    Collection Time: 08/01/22  2:14 PM   Result Value Ref Range    WBC 16.5 (H) 4.6 - 13.2 K/uL    RBC 4.06 (L) 4.20 - 5.30 M/uL    HGB 12.2 12.0 - 16.0 g/dL    HCT 36.5 35.0 - 45.0 %    MCV 89.9 78.0 - 100.0 FL    MCH 30.0 24.0 - 34.0 PG    MCHC 33.4 31.0 - 37.0 g/dL    RDW 13.2 11.6 - 14.5 %    PLATELET 111 626 - 144 K/uL    MPV 11.2 9.2 - 11.8 FL    NRBC 0.0 0  WBC    ABSOLUTE NRBC 0.00 0.00 - 0.01 K/uL    NEUTROPHILS 78 (H) 40 - 73 %    LYMPHOCYTES 14 (L) 21 - 52 %    MONOCYTES 7 3 - 10 %    EOSINOPHILS 0 0 - 5 %    BASOPHILS 0 0 - 2 %    IMMATURE GRANULOCYTES 1 (H) 0.0 - 0.5 %    ABS. NEUTROPHILS 12.8 (H) 1.8 - 8.0 K/UL    ABS. LYMPHOCYTES 2.4 0.9 - 3.6 K/UL    ABS. MONOCYTES 1.2 0.05 - 1.2 K/UL    ABS. EOSINOPHILS 0.1 0.0 - 0.4 K/UL    ABS. BASOPHILS 0.0 0.0 - 0.1 K/UL    ABS. IMM. GRANS. 0.1 (H) 0.00 - 0.04 K/UL    DF AUTOMATED     METABOLIC PANEL, COMPREHENSIVE    Collection Time: 08/01/22  2:14 PM   Result Value Ref Range    Sodium 140 136 - 145 mmol/L    Potassium 3.4 (L) 3.5 - 5.5 mmol/L    Chloride 103 100 - 111 mmol/L    CO2 31 21 - 32 mmol/L    Anion gap 6 3.0 - 18 mmol/L    Glucose 109 (H) 74 - 99 mg/dL    BUN 16 7.0 - 18 MG/DL    Creatinine 1.00 0.6 - 1.3 MG/DL    BUN/Creatinine ratio 16 12 - 20      GFR est AA >60 >60 ml/min/1.73m2    GFR est non-AA 55 (L) >60 ml/min/1.73m2    Calcium 9.3 8.5 - 10.1 MG/DL    Bilirubin, total 0.9 0.2 - 1.0 MG/DL    ALT (SGPT) 42 13 - 56 U/L    AST (SGOT) 36 10 - 38 U/L    Alk.  phosphatase 123 (H) 45 - 117 U/L    Protein, total 6.6 6.4 - 8.2 g/dL    Albumin 2.9 (L) 3.4 - 5.0 g/dL    Globulin 3.7 2.0 - 4.0 g/dL    A-G Ratio 0.8 0.8 - 1.7     NT-PRO BNP    Collection Time: 08/01/22  2:14 PM   Result Value Ref Range    NT pro- 0 - 900 PG/ML   TROPONIN-HIGH SENSITIVITY    Collection Time: 08/01/22  2:14 PM   Result Value Ref Range    Troponin-High Sensitivity 9 0 - 54 ng/L   CK    Collection Time: 08/01/22  2:14 PM   Result Value Ref Range     (H) 26 - 192 U/L   CULTURE, BLOOD    Collection Time: 08/01/22  2:14 PM    Specimen: Blood   Result Value Ref Range    Special Requests: NO SPECIAL REQUESTS      Culture result: NO GROWTH 3 DAYS     MAGNESIUM    Collection Time: 08/01/22  2:14 PM   Result Value Ref Range    Magnesium 2.2 1.6 - 2.6 mg/dL   POC LACTIC ACID    Collection Time: 08/01/22  2:23 PM   Result Value Ref Range    Lactic Acid (POC) 3.95 (HH) 0.40 - 2.00 mmol/L   CULTURE, BLOOD    Collection Time: 08/01/22  2:30 PM    Specimen: Blood   Result Value Ref Range    Special Requests: NO SPECIAL REQUESTS      GRAM STAIN ANAEROBIC BOTTLE GRAM NEGATIVE RODS      GRAM STAIN        SMEAR CALLED TO AND CORRECTLY REPEATED BY: SAILAJA HARRY, RN, 4S, 1157 8/2/22 TO [facility]    Culture result: (A)       BACTEROIDES FRAGILIS GROWING IN 1 OF 2 BOTTLES DRAWN No Site Indicated BETA LACTAMASE POSITIVE    Culture result: REMAINING BOTTLE(S) HAS/HAVE NO GROWTH SO FAR     BLOOD CULTURE ID PANEL    Collection Time: 08/01/22  2:30 PM    Specimen: Blood   Result Value Ref Range    Acc. no. from Micro Order [id number]     Enterococcus faecalis Not detected NOTD      Enterococcus faecium Not detected NOTD      Listeria monocytogenes Not detected NOTD      Staphylococcus Not detected NOTD      Staphylococcus aureus Not detected NOTD      Staph epidermidis Not detected NOTD      Staph lugdunensis Not detected NOTD      Streptococcus Not detected NOTD      Streptococcus agalactiae (Group B) Not detected NOTD      Streptococcus pneumoniae Not detected NOTD      Streptococcus pyogenes (Group A) Not detected NOTD      Acinetobacter calcoaceticus-baumanii complex Not detected NOTD      Bacteroides fragilis Detected (A) NOTD      Enterobacterales species Not detected NOTD      Enterobacter cloacae complex Not detected NOTD      Escherichia coli Not detected NOTD      Klebsiella aerogenes Not detected NOTD      Klebsiella oxytoca Not detected NOTD      Klebsiella pneumoniae group Not detected NOTD      Proteus Not detected NOTD      Salmonella Not detected NOTD      Serratia marcescens Not detected NOTD      Haemophilus influenzae Not detected NOTD      Neisseria meningitidis Not detected NOTD      Pseudomonas aeruginosa Not detected NOTD      Steno maltophilia Not detected NOTD      Candida albicans Not detected NOTD      Candida auris Not detected NOTD      Candida glabrata Not detected NOTD      Anita krusei Not detected NOTD      Candida parapsilosis Not detected NOTD      Candida tropicalis Not detected NOTD      Crypto neoformans/gattii Not detected NOTD      RESISTANT GENES:          Comment        False positive results may rarely occur. Correlate with clinical,epidemiologic, and other laboratory findings   EKG, 12 LEAD, INITIAL    Collection Time: 08/01/22  3:32 PM   Result Value Ref Range    Ventricular Rate 82 BPM    Atrial Rate 82 BPM    P-R Interval 172 ms    QRS Duration 84 ms    Q-T Interval 394 ms    QTC Calculation (Bezet) 460 ms    Calculated P Axis 46 degrees    Calculated R Axis 28 degrees    Calculated T Axis 42 degrees    Diagnosis       Normal sinus rhythm  Normal ECG  When compared with ECG of 22-APR-2014 15:37,  No significant change was found  Confirmed by Yazan James MD, ----- ([id number]) on 8/2/2022 4:00:01 PM     CULTURE, WOUND Renny Morton STAIN    Collection Time: 08/01/22  5:31 PM    Specimen: Skin;  Wound   Result Value Ref Range    Special Requests: NO SPECIAL REQUESTS      GRAM STAIN 3+ GRAM POSITIVE COCCI IN CLUSTERS      GRAM STAIN 2+ GRAM POSITIVE RODS (CORYNEFORM)      GRAM STAIN 3+ GRAM NEGATIVE RODS      Culture result: HEAVY KLEBSIELLA PNEUMONIAE (A)      Culture result: (A)       HEAVY PROTEUS MIRABILIS (ID AND SENSITIVITY CONFIRMATION TO FOLLOW)    Culture result: MODERATE  POSSIBLE  3RD GRAM NEGATIVE ECTOR   (A)      Culture result: (A)       HEAVY Preliminary report of probable S. aureus (Negative for antigen detection) confirmation and sensitivities to follow.  STAPHYLOCOCCUS AUREUS       Susceptibility    Klebsiella pneumoniae - CORBY     Amikacin ($)  Susceptible ug/mL     Ampicillin ($)  Resistant ug/mL     Ampicillin/sulbactam ($)  Susceptible ug/mL     Cefazolin ($)  Susceptible ug/mL     Ceftazidime ($)  Susceptible ug/mL     Ceftriaxone ($)  Susceptible ug/mL     Cefepime ($$)  Susceptible ug/mL     Ciprofloxacin ($)  Susceptible ug/mL     Gentamicin ($)  Susceptible ug/mL     Levofloxacin ($)  Susceptible ug/mL     Meropenem ($$)  Susceptible ug/mL     Piperacillin/Tazobac ($)  Susceptible ug/mL     Tobramycin ($)  Susceptible ug/mL     Trimeth/Sulfa  Susceptible ug/mL     Cefoxitin  Susceptible ug/mL    Proteus mirabilis - CORBY     Amikacin ($)  Susceptible ug/mL     Cefazolin ($)  Resistant ug/mL     Ceftazidime ($)  Susceptible ug/mL     Ceftriaxone ($)  Susceptible ug/mL     Cefepime ($$)  Susceptible ug/mL     Ciprofloxacin ($)  Susceptible ug/mL     Gentamicin ($)  Susceptible ug/mL     Levofloxacin ($)  Susceptible ug/mL     Meropenem ($$)  Susceptible ug/mL     Piperacillin/Tazobac ($)  Susceptible ug/mL     Tobramycin ($)  Susceptible ug/mL     Trimeth/Sulfa  Susceptible ug/mL     Cefoxitin  Resistant ug/mL   METABOLIC PANEL, COMPREHENSIVE    Collection Time: 08/02/22  2:59 AM   Result Value Ref Range    Sodium 144 136 - 145 mmol/L    Potassium 3.6 3.5 - 5.5 mmol/L    Chloride 108 100 - 111 mmol/L    CO2 28 21 - 32 mmol/L    Anion gap 8 3.0 - 18 mmol/L    Glucose 104 (H) 74 - 99 mg/dL    BUN 16 7.0 - 18 MG/DL    Creatinine 1. 01 0.6 - 1.3 MG/DL    BUN/Creatinine ratio 16 12 - 20      GFR est AA >60 >60 ml/min/1.73m2    GFR est non-AA 55 (L) >60 ml/min/1.73m2    Calcium 8.6 8.5 - 10.1 MG/DL    Bilirubin, total 1.1 (H) 0.2 - 1.0 MG/DL    ALT (SGPT) 33 13 - 56 U/L    AST (SGOT) 39 (H) 10 - 38 U/L    Alk. phosphatase 95 45 - 117 U/L    Protein, total 6.2 (L) 6.4 - 8.2 g/dL    Albumin 2.3 (L) 3.4 - 5.0 g/dL    Globulin 3.9 2.0 - 4.0 g/dL    A-G Ratio 0.6 (L) 0.8 - 1.7     CBC WITH AUTOMATED DIFF    Collection Time: 08/02/22  2:59 AM   Result Value Ref Range    WBC 7.8 4.6 - 13.2 K/uL    RBC 4.19 (L) 4.20 - 5.30 M/uL    HGB 12.6 12.0 - 16.0 g/dL    HCT 38.8 35.0 - 45.0 %    MCV 92.6 78.0 - 100.0 FL    MCH 30.1 24.0 - 34.0 PG    MCHC 32.5 31.0 - 37.0 g/dL    RDW 13.4 11.6 - 14.5 %    PLATELET  349 - 784 K/uL     UNABLE TO REPORT ACCURATE COUNT DUE TO PLATELET AGGREGATION, HOWEVER, PLATELETS APPEAR NORMAL IN NUMBER ON SMEAR. PLEASE RESUBMIT SODIUM CITRATE (BLUE) AND EDTA (LAVENDER) TUBES FOR HEMATOLOGICAL TESTING. MPV 10.8 9.2 - 11.8 FL    NRBC 0.0 0  WBC    ABSOLUTE NRBC 0.00 0.00 - 0.01 K/uL    NEUTROPHILS 80 (H) 40 - 73 %    LYMPHOCYTES 15 (L) 21 - 52 %    MONOCYTES 4 3 - 10 %    EOSINOPHILS 1 0 - 5 %    BASOPHILS 0 0 - 2 %    IMMATURE GRANULOCYTES 0 0.0 - 0.5 %    ABS. NEUTROPHILS 6.2 1.8 - 8.0 K/UL    ABS. LYMPHOCYTES 1.2 0.9 - 3.6 K/UL    ABS. MONOCYTES 0.3 0.05 - 1.2 K/UL    ABS. EOSINOPHILS 0.1 0.0 - 0.4 K/UL    ABS. BASOPHILS 0.0 0.0 - 0.1 K/UL    ABS. IMM.  GRANS. 0.0 0.00 - 0.04 K/UL    DF AUTOMATED     TSH 3RD GENERATION    Collection Time: 08/02/22  2:59 AM   Result Value Ref Range    TSH 1.63 0.36 - 3.74 uIU/mL   PROTHROMBIN TIME + INR    Collection Time: 08/02/22  8:21 AM   Result Value Ref Range    Prothrombin time 14.5 11.5 - 15.2 sec    INR 1.1 0.8 - 1.2     PLATELET COUNT    Collection Time: 08/02/22  8:21 AM   Result Value Ref Range    PLATELET 378 (L) 207 - 420 K/uL   VANCOMYCIN, RANDOM    Collection Time: 08/03/22  2:21 AM   Result Value Ref Range    Vancomycin, random 11.3 5.0 - 40.0 UG/ML   CULTURE, BLOOD    Collection Time: 08/03/22  7:36 AM    Specimen: Blood   Result Value Ref Range    Special Requests: NO SPECIAL REQUESTS      Culture result: NO GROWTH AFTER 22 HOURS     METABOLIC PANEL, BASIC    Collection Time: 08/04/22  2:31 AM   Result Value Ref Range    Sodium 143 136 - 145 mmol/L    Potassium 4.0 3.5 - 5.5 mmol/L    Chloride 110 100 - 111 mmol/L    CO2 26 21 - 32 mmol/L    Anion gap 7 3.0 - 18 mmol/L    Glucose 178 (H) 74 - 99 mg/dL    BUN 17 7.0 - 18 MG/DL    Creatinine 1.06 0.6 - 1.3 MG/DL    BUN/Creatinine ratio 16 12 - 20      GFR est AA >60 >60 ml/min/1.73m2    GFR est non-AA 52 (L) >60 ml/min/1.73m2    Calcium 8.8 8.5 - 10.1 MG/DL       Diagnostic Studies:  Study Result    Narrative & Impression   CT Abdomen And Pelvis with Intravenous Contrast     INDICATION: Lumbar soft tissue abscess. TECHNIQUE: 5 mm collimation axial images obtained from the diaphragm to the  level of the pubic symphysis following the uneventful administration of  100 cc  of low osmolar, nonionic intravenous contrast.     Dose reduction techniques used: Automated exposure control, adjustment of the  mAs and/or kVp according to patient size, standardized low-dose protocol, and/or  iterative reconstruction technique. COMPARISON: None. ABDOMEN FINDINGS:     Lung Bases: Bibasilar atelectasis. Small pleural effusions. Liver: Normal attenuation. No evidence for mass. Gallbladder: Cholelithiasis. No biliary ductal dilatation. Pancreas: Normal attenuation without mass or ductal dilatation. Spleen: Normal in size. No evidence of mass. .     Adrenal Glands: No evidence for mass. Kidneys:     Right: Normal enhancement. No cortical mass. No hydronephrosis. Left:  Normal enhancement. Left renal cyst.  No hydronephrosis. Lymph Nodes: Upper abdominal lymph nodes.  There is a 1.3 cm lymph node in the  jesse hepatis. Aorta:   Normal in caliber     PELVIS FINDINGS:     Bowel: Small Bowel: Normal in caliber with normal wall thickness. Large Bowel: Normal in caliber with normal wall thickness. Moderate stool  burden. Appendix: Normal appendix. Bladder: Underdistended. The uterus is surgically absent. There is no suspicious adnexal mass. There is no free air. There is a small volume of ascites. There is soft tissue stranding and some ill-defined fluid in the deep  subcutaneous tissues which measures approximately 4.8 x 3.1 cm. This is in the  right lateral lumbar region. Bones: No acute finding. IMPRESSION     Soft tissue stranding and ill-defined fluid in the right paralumbar region  measuring 4.8 x 3.1 cm. Small volume of ascites in the abdomen and pelvis. No definite acute bowel abnormality. Moderate stool burden. Additional incidentals as above.        Miya Dutta, [address]

## 2022-08-04 NOTE — ROUTINE PROCESS
Bedside and Verbal shift change report given to Jesus Gee RN (oncoming nurse) by Nadira Christie RN (offgoing nurse). Report included the following information SBAR.

## 2022-08-04 NOTE — PROGRESS NOTES
Problem: Self Care Deficits Care Plan (Adult)  Goal: *Acute Goals and Plan of Care (Insert Text)  Description: Occupational Therapy Goals  Initiated 8/2/2022 within 7 day(s). 1.  Patient will perform bed mobility in preparation for self-care with minimal assistance/contact guard assist x 1, bed simulated to home environment . 2.  Patient will perform upper body dressing with supervision/set-up. 3.  Patient will perform functional activity sitting EOB with modified independence > 5 minutes, G balance. 4.  Patient will perform toilet transfers with minimal assistance/contact guard assist.  5.  Patient will perform all aspects of toileting with minimal assistance/contact guard assist.  6.  Patient will participate in upper extremity therapeutic exercise/activities with supervision/set-up for 8 minutes. 7.  Patient will utilize energy conservation techniques during functional activities with verbal cues. Prior Level of Function: Patient reports she needed assist with self-care prn from her brothers and used a rollator for functional mobility PTA. Outcome: Progressing Towards Goal   OCCUPATIONAL THERAPY TREATMENT    Patient: Debora Pineda (56 y.o. female)  Date: 8/4/2022  Diagnosis: Sepsis ([address] [A41.9]  Leukocytosis [D72.829]  Open wound of lower back [S31.000A] Severe sepsis [facility])      Precautions: Fall, Skin    Chart, occupational therapy assessment, plan of care, and goals were reviewed. ASSESSMENT:  Co-treated w/PT to maximize safety w/functional transfer training and pt participation. Pt requires max encouragement for minimal participation 2/2 c/o LLE pain 8/10 and increase anxiety. Pt requires increase time w/bed mobility to maneuver to EOB and Total Assist w/LB dressing donning R sock. Pt requires 2 person assist w/functional transfer to standing w/RW using rocking technique to gain momentum.  Provided verbal/tactile cues for weight shifting L/R in standing w/functional transfer to chair for carryover w/functional transfer to [facility]. Educated on importance OOB and reinforced importance of calling for assistance. Progression toward goals:  []          Improving appropriately and progressing toward goals  [x]          Improving slowly and progressing toward goals  []          Not making progress toward goals and plan of care will be adjusted     PLAN:  Patient continues to benefit from skilled intervention to address the above impairments. Continue treatment per established plan of care. Further Equipment Recommendations for Discharge:  shower chair, rolling walker, and possibly wheelchair     AMPAC: Based on an AM-PAC score of 14/24 and their current ADL deficits; it is recommended that the patient have 3-5 sessions per week of Occupational Therapy at d/c to increase the patient's independence. This AMPAC score should be considered in conjunction with interdisciplinary team recommendations to determine the most appropriate discharge setting. Patient's social support, diagnosis, medical stability, and prior level of function should also be taken into consideration. SUBJECTIVE:   Patient stated I just don't like pain.     OBJECTIVE DATA SUMMARY:   Cognitive/Behavioral Status:  Neurologic State: Alert  Orientation Level: Oriented X4  Cognition: Follows commands  Safety/Judgement: Fall prevention, Lack of insight into deficits    Functional Mobility and Transfers for ADLs:   Bed Mobility:  Supine to Sit: Stand-by assistance  Scooting: Stand-by assistance     Transfers:  Sit to Stand: Moderate assistance w/RW  Stand to Sit: Minimum assistance  Stand Pivot Transfers: Minimum assistance w/RW    Balance:  Sitting: Intact  Sitting - Static: Good (unsupported)  Sitting - Dynamic: Good (unsupported)  Standing: Impaired; With support  Standing - Static: Fair  Standing - Dynamic : Fair    ADL Intervention:  Lower Body Dressing Assistance  Socks:  Total assistance (dependent)    Pain:  Pain level pre-treatment: 8/10   Pain level post-treatment: 8/10  Pt c/o LLE pain. Elevated at end of session    Activity Tolerance:    Fair 2/2 increase anxiety    Please refer to the flowsheet for vital signs taken during this treatment. After treatment:   [x]  Patient left in no apparent distress sitting up in chair  []  Patient left in no apparent distress in bed  [x]  Call bell left within reach  []  Nursing notified  []  Caregiver present  []  Bed alarm activated    COMMUNICATION/EDUCATION:   [] Role of Occupational Therapy in the acute care setting  [] Home safety education was provided and the patient/caregiver indicated understanding. [] Patient/family have participated as able in working towards goals and plan of care. [x] Patient/family agree to work toward stated goals and plan of care. [] Patient understands intent and goals of therapy, but is neutral about his/her participation. [] Patient is unable to participate in goal setting and plan of care. Thank you for this referral.  KIKI Fair  Time Calculation: 27 mins    [facility] AM-PAC® Daily Activity Inpatient Short Form (6-Clicks)*    How much HELP from another person does the patient currently need    (If the patient hasn't done an activity recently, how much help from another person do you think he/she would need if he/she tried?)   Total (Total A or Dep)   A Lot  (Mod to Max A)   A Little (Sup or Min A)   None (Mod I to I)   Putting on and taking off regular lower body clothing? [x] 1 [] 2 [] 3 [] 4   2. Bathing (including washing, rinsing,      drying)? [] 1 [x] 2 [] 3 [] 4   3. Toileting, which includes using toilet, bedpan or urinal?   [] 1 [x] 2 [] 3 [] 4   4. Putting on and taking off regular upper body clothing? [] 1 [] 2 [x] 3 [] 4   5. Taking care of personal grooming such as brushing teeth? [] 1 [] 2 [x] 3 [] 4   6. Eating meals?    [] 1 [] 2 [x] 3 [] 4

## 2022-08-05 LAB
BACTERIA SPEC CULT: ABNORMAL
ERYTHROCYTE [DISTWIDTH] IN BLOOD BY AUTOMATED COUNT: 13.6 % (ref 11.6–14.5)
GRAM STN SPEC: ABNORMAL
HCT VFR BLD AUTO: 32.6 % (ref 35–45)
HGB BLD-MCNC: 10.9 G/DL (ref 12–16)
MCH RBC QN AUTO: 30.2 PG (ref 24–34)
MCHC RBC AUTO-ENTMCNC: 33.4 G/DL (ref 31–37)
MCV RBC AUTO: 90.3 FL (ref 78–100)
NRBC # BLD: 0 K/UL (ref 0–0.01)
NRBC BLD-RTO: 0 PER 100 WBC
PLATELET # BLD AUTO: 113 K/UL (ref 135–420)
PMV BLD AUTO: 10.9 FL (ref 9.2–11.8)
RBC # BLD AUTO: 3.61 M/UL (ref 4.2–5.3)
SERVICE CMNT-IMP: ABNORMAL
WBC # BLD AUTO: 6.4 K/UL (ref 4.6–13.2)

## 2022-08-05 PROCEDURE — 97116 GAIT TRAINING THERAPY: CPT

## 2022-08-05 PROCEDURE — 77030040393 HC DRSG OPTIFOAM GENT MDII -B

## 2022-08-05 PROCEDURE — 87205 SMEAR GRAM STAIN: CPT

## 2022-08-05 PROCEDURE — 65270000046 HC RM TELEMETRY

## 2022-08-05 PROCEDURE — 99232 SBSQ HOSP IP/OBS MODERATE 35: CPT | Performed by: HOSPITALIST

## 2022-08-05 PROCEDURE — 74011250636 HC RX REV CODE- 250/636: Performed by: INTERNAL MEDICINE

## 2022-08-05 PROCEDURE — 36415 COLL VENOUS BLD VENIPUNCTURE: CPT

## 2022-08-05 PROCEDURE — 0JB70ZZ EXCISION OF BACK SUBCUTANEOUS TISSUE AND FASCIA, OPEN APPROACH: ICD-10-PCS | Performed by: SURGERY

## 2022-08-05 PROCEDURE — 74011250637 HC RX REV CODE- 250/637: Performed by: HOSPITALIST

## 2022-08-05 PROCEDURE — 85027 COMPLETE CBC AUTOMATED: CPT

## 2022-08-05 PROCEDURE — 74011250636 HC RX REV CODE- 250/636: Performed by: EMERGENCY MEDICINE

## 2022-08-05 PROCEDURE — 87077 CULTURE AEROBIC IDENTIFY: CPT

## 2022-08-05 PROCEDURE — 87185 SC STD ENZYME DETCJ PER NZM: CPT

## 2022-08-05 PROCEDURE — 97535 SELF CARE MNGMENT TRAINING: CPT

## 2022-08-05 PROCEDURE — 87186 SC STD MICRODIL/AGAR DIL: CPT

## 2022-08-05 PROCEDURE — 97530 THERAPEUTIC ACTIVITIES: CPT

## 2022-08-05 PROCEDURE — 74011000250 HC RX REV CODE- 250: Performed by: INTERNAL MEDICINE

## 2022-08-05 PROCEDURE — 2709999900 HC NON-CHARGEABLE SUPPLY

## 2022-08-05 PROCEDURE — 74011000258 HC RX REV CODE- 258: Performed by: EMERGENCY MEDICINE

## 2022-08-05 PROCEDURE — 77030041076 HC DRSG AG OPTICELL MDII -A

## 2022-08-05 PROCEDURE — 94762 N-INVAS EAR/PLS OXIMTRY CONT: CPT

## 2022-08-05 PROCEDURE — 74011000250 HC RX REV CODE- 250: Performed by: SURGERY

## 2022-08-05 PROCEDURE — 74011250637 HC RX REV CODE- 250/637: Performed by: INTERNAL MEDICINE

## 2022-08-05 PROCEDURE — 77030038269 HC DRN EXT URIN PURWCK BARD -A

## 2022-08-05 RX ADMIN — DEXAMETHASONE 2 MG: 2 TABLET ORAL at 08:26

## 2022-08-05 RX ADMIN — DEXAMETHASONE 2 MG: 2 TABLET ORAL at 22:32

## 2022-08-05 RX ADMIN — PYRIDOXINE HCL TAB 50 MG 200 MG: 50 TAB at 08:26

## 2022-08-05 RX ADMIN — PIPERACILLIN AND TAZOBACTAM 3.38 G: 3; .375 INJECTION, POWDER, FOR SOLUTION INTRAVENOUS at 16:33

## 2022-08-05 RX ADMIN — HYDRALAZINE HYDROCHLORIDE 25 MG: 50 TABLET, FILM COATED ORAL at 17:51

## 2022-08-05 RX ADMIN — Medication 5 MG: at 23:06

## 2022-08-05 RX ADMIN — DIVALPROEX SODIUM 500 MG: 500 TABLET, EXTENDED RELEASE ORAL at 08:26

## 2022-08-05 RX ADMIN — SODIUM CHLORIDE, PRESERVATIVE FREE 10 ML: 5 INJECTION INTRAVENOUS at 06:12

## 2022-08-05 RX ADMIN — SODIUM CHLORIDE, PRESERVATIVE FREE 10 ML: 5 INJECTION INTRAVENOUS at 13:51

## 2022-08-05 RX ADMIN — ACETAMINOPHEN 650 MG: 325 TABLET, FILM COATED ORAL at 16:42

## 2022-08-05 RX ADMIN — DAKIN'S SOLUTION 0.125% (QUARTER STRENGTH): 0.12 SOLUTION at 22:35

## 2022-08-05 RX ADMIN — Medication 2500 MCG: at 08:26

## 2022-08-05 RX ADMIN — HYDRALAZINE HYDROCHLORIDE 25 MG: 50 TABLET, FILM COATED ORAL at 22:32

## 2022-08-05 RX ADMIN — HYDRALAZINE HYDROCHLORIDE 25 MG: 50 TABLET, FILM COATED ORAL at 08:26

## 2022-08-05 RX ADMIN — AMLODIPINE BESYLATE 10 MG: 10 TABLET ORAL at 08:26

## 2022-08-05 RX ADMIN — GABAPENTIN 300 MG: 300 CAPSULE ORAL at 17:51

## 2022-08-05 RX ADMIN — ACETAMINOPHEN 650 MG: 325 TABLET, FILM COATED ORAL at 06:11

## 2022-08-05 RX ADMIN — SODIUM CHLORIDE, PRESERVATIVE FREE 10 ML: 5 INJECTION INTRAVENOUS at 23:09

## 2022-08-05 RX ADMIN — GABAPENTIN 300 MG: 300 CAPSULE ORAL at 08:26

## 2022-08-05 RX ADMIN — PIPERACILLIN AND TAZOBACTAM 3.38 G: 3; .375 INJECTION, POWDER, FOR SOLUTION INTRAVENOUS at 00:42

## 2022-08-05 RX ADMIN — POLYETHYLENE GLYCOL 3350 17 G: 17 POWDER, FOR SOLUTION ORAL at 08:26

## 2022-08-05 RX ADMIN — LEVOTHYROXINE SODIUM 88 MCG: 88 TABLET ORAL at 06:11

## 2022-08-05 RX ADMIN — PIPERACILLIN AND TAZOBACTAM 3.38 G: 3; .375 INJECTION, POWDER, FOR SOLUTION INTRAVENOUS at 08:27

## 2022-08-05 NOTE — PROGRESS NOTES
Bedside and Verbal shift change report given to Miguel Almazan (oncoming nurse) by Jose Enriquez RN (offgoing nurse). Report included the following information SBAR, Kardex, Intake/Output, MAR, and Recent Results.

## 2022-08-05 NOTE — PROGRESS NOTES
[facility]  [facility]    Patient: Anish Ly Age: 74 y.o. : 1955 MR#: [id number] SSN: [id number]  Date/Time: 2022     Subjective:     Patient seen and evaluated, lying in bed, no acute distress. 76-year-old female presents with complaints of left foot blister and left ankle pain. Patient reports a blister on her left ankle which ruptured and drained serous fluid with a large blister on her left foot currently. Patient also has chronic wounds on her lower back. Patient was recently diagnosed with a brain mass for which she was supposed to undergo a brain mass biopsy for possible glioblastoma however it appears that she has not undergone that procedure yet. 8/3-patient seen and evaluated, lying in bed, no acute distress. Blood cultures positive for Bacteroides, wound culture shows heavy gram-negative rods, sensitivities still pending cultures. ID on board, continue broad-spectrum IV antibiotics. -patient seen and evaluated, sitting up in bed, no acute distress. Blood cultures positive-growing Bacteroides, likely beta-lactamase positive, ID on board for antibiotic therapy management. Culture from wound growing Klebsiella and Proteus mirabilis, continue IV Zosyn. CT abdomen and pelvis shows soft tissue stranding and ill-defined fluid in the right paralumbar region measuring 4.8 x 3.1 cm. ID recommended general surgery consultation, general surgery note reviewed, plan for bedside I&D in a.m.     -patient seen and evaluated, lying in bed, no acute distress. Patient worked with PT and OT today. Awaiting input from general surgery regarding right paralumbar region abscess. Assessment/Plan:     Sepsis 2y to Left foot blister, left ankle pain with chronic wounds on her lower back-continue broad-spectrum IV antibiotics, appreciate wound care input. ID consultation.   CT abdomen and pelvis soft tissue stranding and ill-defined fluid collection along the right paralumbar region measuring 4.8 x 3.1 cm. General surgery consulted, plan for bedside I&D  Bacteremia with wound cultures growing Klebsiella and Proteus mirabilis-continue IV antibiotics, will defer duration to infectious disease. Brain mass-patient currently has a neurosurgical appointment, was supposed to undergo biopsy for possible glioblastoma however it is currently pending. Continue dexamethasone 2 mg twice daily. History of hypertension-resume home medications, monitor blood pressure  History of hypothyroidism-continue Synthroid  History of seizure disorder-continue gabapentin and Depakote  History of breast cancer-continue tamoxifen  DVT prophylaxis-Lovenox  Full code        I had a long discussion with the patient after I met with her brothers yesterday, patient is agreeable to go to skilled nursing facility. Continue current treatment plan, will follow. Sarah Whitten MD  22      Case discussed with:  [x]Patient  []Family  []Nursing  []Case Management  DVT Prophylaxis:  [x]Lovenox  []Hep SQ  []SCDs  []Coumadin   []On Heparin gtt    Objective:   VS: Visit Vitals  /64   Pulse 81   Temp 98.1 °F (36.7 °C)   Resp 17   Ht 5' 3\" (1.6 m)   Wt 83.3 kg (183 lb 9.6 oz)   SpO2 99%   BMI 32.52 kg/m²        Tmax/24hrs: Temp (24hrs), Av.8 °F (36.6 °C), Min:97.6 °F (36.4 °C), Max:98.1 °F (36.7 °C)  IOBRIEF  Intake/Output Summary (Last 24 hours) at 2022 1703  Last data filed at 2022 0615  Gross per 24 hour   Intake 300 ml   Output 1300 ml   Net -1000 ml         General:  Alert, cooperative, no acute distress, blind in right eye  Pulmonary:  CTA Bilaterally. No Wheezing/Rhonchi/Rales. Cardiovascular: Regular rate and Rhythm. GI:  Soft, Non distended, Non tender. + Bowel sounds. Extremities: Bilateral lower extremity edema with open wounds  Neurologic: Alert and oriented X 3. No acute neuro deficits.   Additional:    Medications:   Current Facility-Administered Medications   Medication Dose Route Frequency    melatonin (rapid dissolve) tablet 5 mg  5 mg Oral QHS PRN    sodium chloride (NS) flush 5-10 mL  5-10 mL IntraVENous PRN    piperacillin-tazobactam (ZOSYN) 3.375 g in 0.9% sodium chloride (MBP/ADV) 100 mL MBP  3.375 g IntraVENous Q8H    sodium chloride (NS) flush 5-40 mL  5-40 mL IntraVENous Q8H    sodium chloride (NS) flush 5-40 mL  5-40 mL IntraVENous PRN    acetaminophen (TYLENOL) tablet 650 mg  650 mg Oral Q6H PRN    Or    acetaminophen (TYLENOL) suppository 650 mg  650 mg Rectal Q6H PRN    polyethylene glycol (MIRALAX) packet 17 g  17 g Oral DAILY PRN    ondansetron (ZOFRAN ODT) tablet 4 mg  4 mg Oral Q8H PRN    Or    ondansetron (ZOFRAN) injection 4 mg  4 mg IntraVENous Q6H PRN    albuterol-ipratropium (DUO-NEB) 2.5 MG-0.5 MG/3 ML  3 mL Nebulization Q6H PRN    nitroglycerin (NITROBID) 2 % ointment 0.5 Inch  0.5 Inch Topical Q6H PRN    naloxone (NARCAN) injection 0.4 mg  0.4 mg IntraVENous EVERY 2 MINUTES AS NEEDED    amLODIPine (NORVASC) tablet 10 mg  10 mg Oral DAILY    cyanocobalamin (VITAMIN B12) sublingual tablet 2,500 mcg  2,500 mcg Oral DAILY    dexAMETHasone (DECADRON) tablet 2 mg  2 mg Oral Q12H    divalproex ER (DEPAKOTE ER) 24 hour tablet 500 mg  500 mg Oral DAILY    gabapentin (NEURONTIN) capsule 300 mg  300 mg Oral BID    hydrALAZINE (APRESOLINE) tablet 25 mg  25 mg Oral TID    levothyroxine (SYNTHROID) tablet 88 mcg  88 mcg Oral 6am    atenoloL (TENORMIN) tablet 100 mg  100 mg Oral DAILY    oxyCODONE-acetaminophen (PERCOCET) 5-325 mg per tablet 1 Tablet  1 Tablet Oral Q4H PRN    pyridoxine (vitamin B6) (VITAMIN B-6) tablet 200 mg  200 mg Oral DAILY       Imaging:   XR Results (most recent):  Results from Hospital Encounter encounter on 08/01/22    XR CHEST PORT    Narrative  EXAM: XR CHEST PORT    CLINICAL INDICATION/HISTORY: 74 years Female. chest pain.   Additional History: None    TECHNIQUE: Frontal view of the chest    COMPARISON: Chest radiograph 3/2/2020    FINDINGS:    The cardiac silhouette appears within normal limits. Tortuosity of the thoracic  aorta, unchanged in appearance. Pulmonary vasculature appears within normal  limits. No confluent airspace opacity is appreciated. No definite evidence of  pleural effusion or pneumothorax. No acute osseous abnormality appreciated. Impression  No radiographic evidence of acute cardiopulmonary process. CT Results (most recent):  Results from Hospital Encounter encounter on 08/01/22    CT ABD PELV W CONT    Narrative  CT Abdomen And Pelvis with Intravenous Contrast    INDICATION: Lumbar soft tissue abscess. TECHNIQUE: 5 mm collimation axial images obtained from the diaphragm to the  level of the pubic symphysis following the uneventful administration of  100 cc  of low osmolar, nonionic intravenous contrast.    Dose reduction techniques used: Automated exposure control, adjustment of the  mAs and/or kVp according to patient size, standardized low-dose protocol, and/or  iterative reconstruction technique. COMPARISON: None. ABDOMEN FINDINGS:    Lung Bases: Bibasilar atelectasis. Small pleural effusions. Liver: Normal attenuation. No evidence for mass. Gallbladder: Cholelithiasis. No biliary ductal dilatation. Pancreas: Normal attenuation without mass or ductal dilatation. Spleen: Normal in size. No evidence of mass. .    Adrenal Glands: No evidence for mass. Kidneys:    Right: Normal enhancement. No cortical mass. No hydronephrosis. Left:  Normal enhancement. Left renal cyst.  No hydronephrosis. Lymph Nodes: Upper abdominal lymph nodes. There is a 1.3 cm lymph node in the  jesse hepatis. Aorta:   Normal in caliber    PELVIS FINDINGS:    Bowel: Small Bowel: Normal in caliber with normal wall thickness. Large Bowel: Normal in caliber with normal wall thickness. Moderate stool  burden. Appendix: Normal appendix.     Bladder: Underdistended. The uterus is surgically absent. There is no suspicious adnexal mass. There is no free air. There is a small volume of ascites. There is soft tissue stranding and some ill-defined fluid in the deep  subcutaneous tissues which measures approximately 4.8 x 3.1 cm. This is in the  right lateral lumbar region. Bones: No acute finding. Impression  Soft tissue stranding and ill-defined fluid in the right paralumbar region  measuring 4.8 x 3.1 cm. Small volume of ascites in the abdomen and pelvis. No definite acute bowel abnormality. Moderate stool burden. Additional incidentals as above. MRI Results (most recent):  Results from [place] encounter on 03/31/14    MRI BREAST BX VAC ASSIST RT    Addendum 4/3/2014  4:54 PM  Addendum: Pathology changes from right breast biopsy 12:00 position-  Fibrocystic changes with florid ductal epithelial hyperplasia. The findings are benign and concordant. Narrative  MR guided left breast biopsy    HISTORY: Left breast cancer, abnormal MRI    COMPARISON: MRI March 2014    FINDINGS:    Informed consent was obtained. The risks of the procedure including but not  limited to bleeding and infection were reviewed with the patient who wished to  proceed. Preliminary pre and post contrast images were performed. 20cc  Magnevist IV was administered. With the patient positioned prone, and following placement in the compression  device, the left breast was imaged, and the lesion in the 12:00 left breast was  targeted. Appropriate coordinates records were obtained. The skin was  cleansed and local anesthesia administered. After making a small incision in  the skin with a # 11 scalpel blade, the trocar and biopsy sheath were advanced  to the appropriate depth. After confirmation of appropriate positioning the 9  gauge 20 mm vacuum assisted biopsy needle was advanced to the appropriate  depth.   Subsequently a series of approximately 12 vacuum assisted samples were  acquired in a radial fashion. Prior to removal of the sheath, using coaxial technique, a Dataslide  cylinder shaped clip was placed to oscar area sampled. The patient was removed from the biopsy device, the wound was dressed and post  biopsy instructions were given to the patient. The samples were sent to the  laboratory for analysis. Diagnostic Mammogram left Breast: CC and MLO views were obtained of the left  breast and document the clip at the site of sampling in the 12:00 left breast.    Complications: No complications. Specimen: 12 vacuum assisted samples  Assistant: None. EBL: 5 cc    Impression  :    Status post MRI guided biopsy of an abnormal area of enhancement in the 12:00  left breast.  Area sampled marked with a     clip. Samples sent to the  laboratory for analysis.         Labs:    Recent Results (from the past 48 hour(s))   METABOLIC PANEL, BASIC    Collection Time: 08/04/22  2:31 AM   Result Value Ref Range    Sodium 143 136 - 145 mmol/L    Potassium 4.0 3.5 - 5.5 mmol/L    Chloride 110 100 - 111 mmol/L    CO2 26 21 - 32 mmol/L    Anion gap 7 3.0 - 18 mmol/L    Glucose 178 (H) 74 - 99 mg/dL    BUN 17 7.0 - 18 MG/DL    Creatinine 1.06 0.6 - 1.3 MG/DL    BUN/Creatinine ratio 16 12 - 20      GFR est AA >60 >60 ml/min/1.73m2    GFR est non-AA 52 (L) >60 ml/min/1.73m2    Calcium 8.8 8.5 - 10.1 MG/DL   CBC W/O DIFF    Collection Time: 08/05/22  3:46 AM   Result Value Ref Range    WBC 6.4 4.6 - 13.2 K/uL    RBC 3.61 (L) 4.20 - 5.30 M/uL    HGB 10.9 (L) 12.0 - 16.0 g/dL    HCT 32.6 (L) 35.0 - 45.0 %    MCV 90.3 78.0 - 100.0 FL    MCH 30.2 24.0 - 34.0 PG    MCHC 33.4 31.0 - 37.0 g/dL    RDW 13.6 11.6 - 14.5 %    PLATELET 810 (L) 090 - 420 K/uL    MPV 10.9 9.2 - 11.8 FL    NRBC 0.0 0  WBC    ABSOLUTE NRBC 0.00 0.00 - 0.01 K/uL       Signed By: Moise Chisholm MD     August 5, 2022      I spent 25 minutes with the patient in face-to-face consultation, of which greater than 50% was spent in counseling and coordination of care as described above    Disclaimer: Sections of this note are dictated using utilizing voice recognition software. Minor typographical errors may be present. If questions arise, please do not hesitate to contact me or call our department.

## 2022-08-05 NOTE — PROGRESS NOTES
Admit Date: 8/1/2022    Assessment    Rocky Lynn is a 74 y.o. female admitted with severe sepsis, lower back wound    Patient Active Problem List   Diagnosis Code    History of breast cancer Z85.3    Severe obesity ([facility] [address] E66.01    Severe sepsis ([facility]) A41.9, R65.20    Open wound of lower back S31.000A    Wound of left leg S81.802A    Glaucoma H40.9    Primary hypertension I10    Acquired hypothyroidism E03.9    Obesity (BMI 30.0-34. 9) E66.9    Impaired mobility and ADLs Z74.09, Z78.9       Plan  -patient needs specialty off loading mattress  -grossly necrotic wounds with sky pus to bone- pictures under media. Wound cultures were obtained  -dressing changes BID with Dakins soaked gauze, ABD pads and tape  -strongly recommend palliative care consult- wounds will not heal and patient has poor outcome with comorbid conditions and glioblastoma  -will continue to follow daily with bedside debridements to assist with clearing infection of necrotic tissue    Subjective    Overnight events: No acute events overnight    Objective    Physical Exam:   Visit Vitals  BP (!) 147/83   Pulse 95   Temp 98.6 °F (37 °C)   Resp 17   Ht 5' 3\" (1.6 m)   Wt 83.3 kg (183 lb 9.6 oz)   SpO2 99%   BMI 32.52 kg/m²       Intake/Output Summary (Last 24 hours) at 8/5/2022 1833  Last data filed at 8/5/2022 3438  Gross per 24 hour   Intake 300 ml   Output 1300 ml   Net -1000 ml     Physical Exam  Skin:     Comments: Sky pus, necrotic muscle, skin, subcutaneous tissue to bone entire lower pressure wound- pictures under media   Neurological:      Mental Status: She is alert.              Ele Graves DO  Phone: [phone]

## 2022-08-05 NOTE — PROGRESS NOTES
Wound Prevention Checklist    Patient: Rayo Wilder (81 y.o. female)  Date: 8/5/2022  Diagnosis: Sepsis ([facility] [address] [A41.9]  Leukocytosis [D72.829]  Open wound of lower back [S31.000A] Severe sepsis ([facility] [address]    Precautions: Fall, Skin       [x]  Heel prevention boots placed on patient    [x]  Patient turned q2h during shift    [x]  Lift team ordered    [x]  Patient on [place] bed/Specialty bed    [x]  Each Wound is documented during shift (Stage, Color, drainage, odor, measurements, and dressings)    [x]  Dual skin checks done at bedside during shift report with Markham Hark RN Carson Lesches        Problem: Falls - Risk of  Goal: *Absence of Falls  Description: Document Sourav Fall Risk and appropriate interventions in the flowsheet. Outcome: Progressing Towards Goal  Note: Fall Risk Interventions:  Mobility Interventions: Bed/chair exit alarm, Patient to call before getting OOB, Assess mobility with egress test, PT Consult for mobility concerns    Medication Interventions: Teach patient to arise slowly, Patient to call before getting OOB, Bed/chair exit alarm    Elimination Interventions: Call light in reach, Bed/chair exit alarm, Toileting schedule/hourly rounds       Problem: Pressure Injury - Risk of  Goal: *Prevention of pressure injury  Description: Document Bret Scale and appropriate interventions in the flowsheet. Outcome: Progressing Towards Goal  Note: Pressure Injury Interventions:  Sensory Interventions: Assess changes in LOC, Keep linens dry and wrinkle-free, Maintain/enhance activity level, Minimize linen layers    Moisture Interventions: Absorbent underpads, Check for incontinence Q2 hours and as needed, Minimize layers, Internal/External urinary devices    Activity Interventions: Pressure redistribution bed/mattress(bed type)    Mobility Interventions: HOB 30 degrees or less, Pressure redistribution bed/mattress (bed type), Float heels, PT/OT evaluation, Suspension boots, Turn and reposition approx. every two hours(pillow and wedges)    Nutrition Interventions: Document food/fluid/supplement intake    Friction and Shear Interventions: HOB 30 degrees or less, Minimize layers       Problem: Pain  Goal: *Control of Pain  Outcome: Progressing Towards Goal

## 2022-08-05 NOTE — PROGRESS NOTES
[facility] Hospitalist Group  Progress Note    Patient: Bernice Quiroz Age: 74 y.o. : 1955 MR#: [id number] SSN: [id number]  Date/Time: 2022     Subjective:     Patient seen and evaluated, lying in bed, no acute distress. 17-year-old female presents with complaints of left foot blister and left ankle pain. Patient reports a blister on her left ankle which ruptured and drained serous fluid with a large blister on her left foot currently. Patient also has chronic wounds on her lower back. Patient was recently diagnosed with a brain mass for which she was supposed to undergo a brain mass biopsy for possible glioblastoma however it appears that she has not undergone that procedure yet. 8/3-patient seen and evaluated, lying in bed, no acute distress. Blood cultures positive for Bacteroides, wound culture shows heavy gram-negative rods, sensitivities still pending cultures. ID on board, continue broad-spectrum IV antibiotics. -patient seen and evaluated, sitting up in bed, no acute distress. Blood cultures positive-growing Bacteroides, likely beta-lactamase positive, ID on board for antibiotic therapy management. Culture from wound growing Klebsiella and Proteus mirabilis, continue IV Zosyn. CT abdomen and pelvis shows soft tissue stranding and ill-defined fluid in the right paralumbar region measuring 4.8 x 3.1 cm. ID recommended general surgery consultation, general surgery note reviewed, plan for bedside I&D in a.m. Assessment/Plan:     Sepsis 2y to Left foot blister, left ankle pain with chronic wounds on her lower back-continue broad-spectrum IV antibiotics, appreciate wound care input. ID consultation. CT abdomen and pelvis soft tissue stranding and ill-defined fluid collection along the right paralumbar region measuring 4.8 x 3.1 cm. General surgery consulted, plan for bedside I&D in a.m.   Bacteremia with wound cultures growing Klebsiella and Proteus mirabilis-continue IV antibiotics, will defer duration to infectious disease. Brain mass-patient currently has a neurosurgical appointment, was supposed to undergo biopsy for possible glioblastoma however it is currently pending. Continue dexamethasone 2 mg twice daily. History of hypertension-resume home medications, monitor blood pressure  History of hypothyroidism-continue Synthroid  History of seizure disorder-continue gabapentin and Depakote  History of breast cancer-continue tamoxifen  DVT prophylaxis-Lovenox  Full code        I had a long discussion with the patient after I met with her brothers yesterday, patient is agreeable to go to skilled nursing facility. Continue current treatment plan, will follow. Jareth Estes MD  22      Case discussed with:  [x]Patient  []Family  []Nursing  []Case Management  DVT Prophylaxis:  [x]Lovenox  []Hep SQ  []SCDs  []Coumadin   []On Heparin gtt    Objective:   VS: Visit Vitals  BP (!) 141/96 (BP 1 Location: Left upper arm, BP Patient Position: At rest)   Pulse 79   Temp 97.7 °F (36.5 °C)   Resp 18   Ht 5' 3\" (1.6 m)   Wt 83.3 kg (183 lb 9.6 oz)   SpO2 98%   BMI 32.52 kg/m²        Tmax/24hrs: Temp (24hrs), Av.5 °F (36.4 °C), Min:97.4 °F (36.3 °C), Max:97.7 °F (36.5 °C)  IOBRIEF  Intake/Output Summary (Last 24 hours) at 2022 2147  Last data filed at 2022 1908  Gross per 24 hour   Intake 960 ml   Output 600 ml   Net 360 ml         General:  Alert, cooperative, no acute distress, blind in right eye  Pulmonary:  CTA Bilaterally. No Wheezing/Rhonchi/Rales. Cardiovascular: Regular rate and Rhythm. GI:  Soft, Non distended, Non tender. + Bowel sounds. Extremities: Bilateral lower extremity edema with open wounds  Neurologic: Alert and oriented X 3. No acute neuro deficits.   Additional:    Medications:   Current Facility-Administered Medications   Medication Dose Route Frequency    melatonin (rapid dissolve) tablet 5 mg  5 mg Oral QHS PRN sodium chloride (NS) flush 5-10 mL  5-10 mL IntraVENous PRN    piperacillin-tazobactam (ZOSYN) 3.375 g in 0.9% sodium chloride (MBP/ADV) 100 mL MBP  3.375 g IntraVENous Q8H    sodium chloride (NS) flush 5-40 mL  5-40 mL IntraVENous Q8H    sodium chloride (NS) flush 5-40 mL  5-40 mL IntraVENous PRN    acetaminophen (TYLENOL) tablet 650 mg  650 mg Oral Q6H PRN    Or    acetaminophen (TYLENOL) suppository 650 mg  650 mg Rectal Q6H PRN    polyethylene glycol (MIRALAX) packet 17 g  17 g Oral DAILY PRN    ondansetron (ZOFRAN ODT) tablet 4 mg  4 mg Oral Q8H PRN    Or    ondansetron (ZOFRAN) injection 4 mg  4 mg IntraVENous Q6H PRN    albuterol-ipratropium (DUO-NEB) 2.5 MG-0.5 MG/3 ML  3 mL Nebulization Q6H PRN    nitroglycerin (NITROBID) 2 % ointment 0.5 Inch  0.5 Inch Topical Q6H PRN    naloxone (NARCAN) injection 0.4 mg  0.4 mg IntraVENous EVERY 2 MINUTES AS NEEDED    amLODIPine (NORVASC) tablet 10 mg  10 mg Oral DAILY    cyanocobalamin (VITAMIN B12) sublingual tablet 2,500 mcg  2,500 mcg Oral DAILY    dexAMETHasone (DECADRON) tablet 2 mg  2 mg Oral Q12H    divalproex ER (DEPAKOTE ER) 24 hour tablet 500 mg  500 mg Oral DAILY    gabapentin (NEURONTIN) capsule 300 mg  300 mg Oral BID    hydrALAZINE (APRESOLINE) tablet 25 mg  25 mg Oral TID    levothyroxine (SYNTHROID) tablet 88 mcg  88 mcg Oral 6am    atenoloL (TENORMIN) tablet 100 mg  100 mg Oral DAILY    oxyCODONE-acetaminophen (PERCOCET) 5-325 mg per tablet 1 Tablet  1 Tablet Oral Q4H PRN    pyridoxine (vitamin B6) (VITAMIN B-6) tablet 200 mg  200 mg Oral DAILY       Imaging:   XR Results (most recent):  Results from Hospital Encounter encounter on 08/01/22    XR CHEST PORT    Narrative  EXAM: XR CHEST PORT    CLINICAL INDICATION/HISTORY: 74 years Female. chest pain. Additional History: None    TECHNIQUE: Frontal view of the chest    COMPARISON: Chest radiograph 3/2/2020    FINDINGS:    The cardiac silhouette appears within normal limits.  Tortuosity of the thoracic  aorta, unchanged in appearance. Pulmonary vasculature appears within normal  limits. No confluent airspace opacity is appreciated. No definite evidence of  pleural effusion or pneumothorax. No acute osseous abnormality appreciated. Impression  No radiographic evidence of acute cardiopulmonary process. CT Results (most recent):  Results from Hospital Encounter encounter on 08/01/22    CT ABD PELV W CONT    Narrative  CT Abdomen And Pelvis with Intravenous Contrast    INDICATION: Lumbar soft tissue abscess. TECHNIQUE: 5 mm collimation axial images obtained from the diaphragm to the  level of the pubic symphysis following the uneventful administration of  100 cc  of low osmolar, nonionic intravenous contrast.    Dose reduction techniques used: Automated exposure control, adjustment of the  mAs and/or kVp according to patient size, standardized low-dose protocol, and/or  iterative reconstruction technique. COMPARISON: None. ABDOMEN FINDINGS:    Lung Bases: Bibasilar atelectasis. Small pleural effusions. Liver: Normal attenuation. No evidence for mass. Gallbladder: Cholelithiasis. No biliary ductal dilatation. Pancreas: Normal attenuation without mass or ductal dilatation. Spleen: Normal in size. No evidence of mass. .    Adrenal Glands: No evidence for mass. Kidneys:    Right: Normal enhancement. No cortical mass. No hydronephrosis. Left:  Normal enhancement. Left renal cyst.  No hydronephrosis. Lymph Nodes: Upper abdominal lymph nodes. There is a 1.3 cm lymph node in the  jesse hepatis. Aorta:   Normal in caliber    PELVIS FINDINGS:    Bowel: Small Bowel: Normal in caliber with normal wall thickness. Large Bowel: Normal in caliber with normal wall thickness. Moderate stool  burden. Appendix: Normal appendix. Bladder: Underdistended. The uterus is surgically absent. There is no suspicious adnexal mass. There is no free air.  There is a small volume of ascites. There is soft tissue stranding and some ill-defined fluid in the deep  subcutaneous tissues which measures approximately 4.8 x 3.1 cm. This is in the  right lateral lumbar region. Bones: No acute finding. Impression  Soft tissue stranding and ill-defined fluid in the right paralumbar region  measuring 4.8 x 3.1 cm. Small volume of ascites in the abdomen and pelvis. No definite acute bowel abnormality. Moderate stool burden. Additional incidentals as above. MRI Results (most recent):  Results from [place] encounter on 03/31/14    MRI BREAST BX VAC ASSIST RT    Addendum 4/3/2014  4:54 PM  Addendum: Pathology changes from right breast biopsy 12:00 position-  Fibrocystic changes with florid ductal epithelial hyperplasia. The findings are benign and concordant. Narrative  MR guided left breast biopsy    HISTORY: Left breast cancer, abnormal MRI    COMPARISON: MRI March 2014    FINDINGS:    Informed consent was obtained. The risks of the procedure including but not  limited to bleeding and infection were reviewed with the patient who wished to  proceed. Preliminary pre and post contrast images were performed. 20cc  Magnevist IV was administered. With the patient positioned prone, and following placement in the compression  device, the left breast was imaged, and the lesion in the 12:00 left breast was  targeted. Appropriate coordinates records were obtained. The skin was  cleansed and local anesthesia administered. After making a small incision in  the skin with a # 11 scalpel blade, the trocar and biopsy sheath were advanced  to the appropriate depth. After confirmation of appropriate positioning the 9  gauge 20 mm vacuum assisted biopsy needle was advanced to the appropriate  depth. Subsequently a series of approximately 12 vacuum assisted samples were  acquired in a radial fashion.     Prior to removal of the sheath, using coaxial technique, a [facility] trimark  cylinder shaped clip was placed to oscar area sampled. The patient was removed from the biopsy device, the wound was dressed and post  biopsy instructions were given to the patient. The samples were sent to the  laboratory for analysis. Diagnostic Mammogram left Breast: CC and MLO views were obtained of the left  breast and document the clip at the site of sampling in the 12:00 left breast.    Complications: No complications. Specimen: 12 vacuum assisted samples  Assistant: None. EBL: 5 cc    Impression  :    Status post MRI guided biopsy of an abnormal area of enhancement in the 12:00  left breast.  Area sampled marked with a     clip. Samples sent to the  laboratory for analysis. Labs:    Recent Results (from the past 48 hour(s))   VANCOMYCIN, RANDOM    Collection Time: 08/03/22  2:21 AM   Result Value Ref Range    Vancomycin, random 11.3 5.0 - 40.0 UG/ML   CULTURE, BLOOD    Collection Time: 08/03/22  7:36 AM    Specimen: Blood   Result Value Ref Range    Special Requests: NO SPECIAL REQUESTS      Culture result: NO GROWTH AFTER 22 HOURS     METABOLIC PANEL, BASIC    Collection Time: 08/04/22  2:31 AM   Result Value Ref Range    Sodium 143 136 - 145 mmol/L    Potassium 4.0 3.5 - 5.5 mmol/L    Chloride 110 100 - 111 mmol/L    CO2 26 21 - 32 mmol/L    Anion gap 7 3.0 - 18 mmol/L    Glucose 178 (H) 74 - 99 mg/dL    BUN 17 7.0 - 18 MG/DL    Creatinine 1.06 0.6 - 1.3 MG/DL    BUN/Creatinine ratio 16 12 - 20      GFR est AA >60 >60 ml/min/1.73m2    GFR est non-AA 52 (L) >60 ml/min/1.73m2    Calcium 8.8 8.5 - 10.1 MG/DL       Signed By: Lexi Alvarenga MD     August 4, 2022      I spent 25 minutes with the patient in face-to-face consultation, of which greater than 50% was spent in counseling and coordination of care as described above    Disclaimer: Sections of this note are dictated using utilizing voice recognition software. Minor typographical errors may be present.  If questions arise, please do not hesitate to contact me or call our department.

## 2022-08-05 NOTE — PROGRESS NOTES
Problem: Self Care Deficits Care Plan (Adult)  Goal: *Acute Goals and Plan of Care (Insert Text)  Description: Occupational Therapy Goals  Initiated 8/2/2022 within 7 day(s). 1.  Patient will perform bed mobility in preparation for self-care with minimal assistance/contact guard assist x 1, bed simulated to home environment . 2.  Patient will perform upper body dressing with supervision/set-up. 3.  Patient will perform functional activity sitting EOB with modified independence > 5 minutes, G balance. 4.  Patient will perform toilet transfers with minimal assistance/contact guard assist.  5.  Patient will perform all aspects of toileting with minimal assistance/contact guard assist.  6.  Patient will participate in upper extremity therapeutic exercise/activities with supervision/set-up for 8 minutes. 7.  Patient will utilize energy conservation techniques during functional activities with verbal cues. Prior Level of Function: Patient reports she needed assist with self-care prn from her brothers and used a rollator for functional mobility PTA. Outcome: Progressing Towards Goal   OCCUPATIONAL THERAPY TREATMENT    Patient: Dustin Remy (47 y.o. female)  Date: 8/5/2022  Diagnosis: Sepsis ([address] [A41.9]  Leukocytosis [D72.829]  Open wound of lower back [S31.000A] Severe sepsis ([address]      Precautions: Fall, Skin  PLOF: Patient reports she needed assist with self-care prn from her brothers and used a rollator for functional mobility PTA. Chart, occupational therapy assessment, plan of care, and goals were reviewed. ASSESSMENT:  PT co tx to maximize pt safety and participation. Pt presented supine in bed upon entry and agreeable to work with OT. Pt came to EOB from supine with SBA requiring increased time. Once sitting EOB, pt was observed having soiled chux pad from BM smear, pt declining sitting on BSC at this time.  STS transfer MOD A with RW and required TOTAL A with guerrero area hygiene 2/2 decreased dyn standing balance and tolerance. She maneuvered to reclining chair ~ 4 ft MIN A with RW, simulating BSC transfer. She was positioned for comfort and left with B LE 's elevated and all needs left within reach. RN made aware of pt's participation and position. Progression toward goals:  []          Improving appropriately and progressing toward goals  [x]          Improving slowly and progressing toward goals  []          Not making progress toward goals and plan of care will be adjusted     PLAN:  Patient continues to benefit from skilled intervention to address the above impairments. Continue treatment per established plan of care. Further Equipment Recommendations for Discharge:  RW and [facility]    AMPAC: Based on an AM-PAC score of 13/24 and their current ADL deficits; it is recommended that the patient have 3-5 sessions per week of Occupational Therapy at d/c to increase the patient's independence. This [facility] score should be considered in conjunction with interdisciplinary team recommendations to determine the most appropriate discharge setting. Patient's social support, diagnosis, medical stability, and prior level of function should also be taken into consideration. SUBJECTIVE:   Patient stated  I will sit up. \"     OBJECTIVE DATA SUMMARY:   Cognitive/Behavioral Status:  Neurologic State: Alert  Orientation Level: Oriented X4  Cognition: Follows commands  Safety/Judgement: Fall prevention, Lack of insight into deficits    Functional Mobility and Transfers for ADLs:   Bed Mobility:     Supine to Sit: Stand-by assistance; Additional time;Bed Modified     Scooting: Contact guard assistance   Transfers:  Sit to Stand: Moderate assistance  Stand to Sit: Contact guard assistance     Balance:  Sitting: Intact  Sitting - Static: Good (unsupported)  Sitting - Dynamic: Good (unsupported)  Standing: Impaired; With support  Standing - Static: Good  Standing - Dynamic : Fair    ADL Intervention: Lower Body Bathing  Perineal  : Total assistance (dependent)  Position Performed: Standing  Adaptive Equipment: Cluey; Wipes(personal cleansing cloth)    Pain:  Pain level pre-treatment: 5/10   Pain level post-treatment: 5/10  Pain Intervention(s): Medication (see MAR); Rest,  Repositioning   Response to intervention: Nurse notified, See doc flow    Activity Tolerance:    Fair   Please refer to the flowsheet for vital signs taken during this treatment. After treatment:   [x]  Patient left in no apparent distress sitting up in chair  []  Patient left in no apparent distress in bed  [x]  Call bell left within reach  [x]  Nursing notified  []  Caregiver present  []  Bed alarm activated    COMMUNICATION/EDUCATION:   [x] Role of Occupational Therapy in the acute care setting  [] Home safety education was provided and the patient/caregiver indicated understanding. [x] Patient/family have participated as able in working towards goals and plan of care. [x] Patient/family agree to work toward stated goals and plan of care. [] Patient understands intent and goals of therapy, but is neutral about his/her participation. [] Patient is unable to participate in goal setting and plan of care. Thank you for this referral.  KIKI Calloway  Time Calculation: 31 mins    [employer] AM-PAC® Daily Activity Inpatient Short Form (6-Clicks)    How much HELP from another person does the patient currently need    (If the patient hasn't done an activity recently, how much help from another person do you think he/she would need if he/she tried?)   Total (Total A or Dep)   A Lot  (Mod to Max A)   A Little (Sup or Min A)   None (Mod I to I)   Putting on and taking off regular lower body clothing? [x] 1 [] 2 [] 3 [] 4   2. Bathing (including washing, rinsing,      drying)? [] 1 [x] 2 [] 3 [] 4   3. Toileting, which includes using toilet, bedpan or urinal?   [] 1 [x] 2 [] 3 [] 4   4.  Putting on and taking off regular upper body clothing? [] 1 [] 2 [x] 3 [] 4   5. Taking care of personal grooming such as brushing teeth? [] 1 [] 2 [x] 3 [] 4   6. Eating meals?    [] 1 [] 2 [x] 3 [] 4

## 2022-08-05 NOTE — PROGRESS NOTES
Problem: Falls - Risk of  Goal: *Absence of Falls  Description: Document [place] Flow Fall Risk and appropriate interventions in the flowsheet. Outcome: Progressing Towards Goal  Note: Fall Risk Interventions:  Mobility Interventions: Assess mobility with egress test, Bed/chair exit alarm, Patient to call before getting OOB, Strengthening exercises (ROM-active/passive), PT Consult for mobility concerns         Medication Interventions: Bed/chair exit alarm, Evaluate medications/consider consulting pharmacy, Patient to call before getting OOB, Teach patient to arise slowly    Elimination Interventions: Bed/chair exit alarm, Call light in reach, Patient to call for help with toileting needs, Toileting schedule/hourly rounds              Problem: Patient Education: Go to Patient Education Activity  Goal: Patient/Family Education  Outcome: Progressing Towards Goal     Problem: Pressure Injury - Risk of  Goal: *Prevention of pressure injury  Description: Document Bret Scale and appropriate interventions in the flowsheet. Outcome: Progressing Towards Goal  Note: Pressure Injury Interventions:  Sensory Interventions: Assess changes in LOC, Float heels, Keep linens dry and wrinkle-free, Maintain/enhance activity level, Minimize linen layers, Turn and reposition approx. every two hours (pillows and wedges if needed), Pressure redistribution bed/mattress (bed type)    Moisture Interventions: Absorbent underpads, Apply protective barrier, creams and emollients, Check for incontinence Q2 hours and as needed, Contain wound drainage, Internal/External urinary devices, Limit adult briefs, Maintain skin hydration (lotion/cream), Minimize layers    Activity Interventions: Pressure redistribution bed/mattress(bed type), PT/OT evaluation    Mobility Interventions: HOB 30 degrees or less, Pressure redistribution bed/mattress (bed type), PT/OT evaluation, Turn and reposition approx.  every two hours(pillow and wedges)    Nutrition Interventions: Document food/fluid/supplement intake    Friction and Shear Interventions: Apply protective barrier, creams and emollients, Feet elevated on foot rest, Foam dressings/transparent film/skin sealants, HOB 30 degrees or less, Lift team/patient mobility team, Minimize layers                Problem: Patient Education: Go to Patient Education Activity  Goal: Patient/Family Education  Outcome: Progressing Towards Goal     Problem: Pain  Goal: *Control of Pain  Outcome: Progressing Towards Goal     Problem: Patient Education: Go to Patient Education Activity  Goal: Patient/Family Education  Outcome: Progressing Towards Goal

## 2022-08-05 NOTE — ROUTINE PROCESS
Bedside and Verbal shift change report given to Jason Cannon RN (oncoming nurse) by Stanley Lemos RN   (offgoing nurse). Report included the following information SBAR, Kardex, Intake/Output, MAR, and Recent Results.

## 2022-08-05 NOTE — PROGRESS NOTES
The CM spoke with the patient about the recommended option of a SNF for recommended therapies to promote the best outcome for the patient. However, the patient stated to the CM \"I'm not reconsidering nothing. I still want to have therapy at home\".     Damian Kang, MSW, [facility]

## 2022-08-05 NOTE — PROGRESS NOTES
Problem: Mobility Impaired (Adult and Pediatric)  Goal: *Acute Goals and Plan of Care (Insert Text)  Description: Physical Therapy Goals  Initiated 8/2/2022 and to be accomplished within 7 day(s)  1. Patient will move from supine to sit and sit to supine , scoot up and down, and roll side to side in bed with minimal assistance/contact guard assist.    2.  Patient will transfer from bed to chair and chair to bed with minimal assistance/contact guard assist using the least restrictive device. 3.  Patient will perform sit to stand with minimal assistance/contact guard assist.  4.  Patient will ambulate with minimal assistance/contact guard assist for 25 feet with the least restrictive device. 5.  Patient will ascend/descend stairs  as needed. 6.  Patient will perform BLE therex as prescribed    PLOF: Pt lives alone, reports she has a rollator, receives some assist from brothers      Outcome: Progressing Towards Goal   PHYSICAL THERAPY TREATMENT    Patient: Ulises You (41 y.o. female)  Date: 8/5/2022  Diagnosis: Sepsis ([facility] [address] [A41.9]  Leukocytosis [D72.829]  Open wound of lower back [S31.000A] Severe sepsis ([address]      Precautions: Fall, Skin    ASSESSMENT:  Pt cleared to participate in PT session, pt received semi-reclined in bed and agreeable to therapy session. Completing with OT to maximize safety and mobility. Pt transitioning to EOB with SBA and increased time, reporting decreased pain through LLE this date. Sitting on EOB with good balance. Standing to [address] [place] [address] with modA. Pt then ambulating with slow, shortened steps to recliner x4 feet away. Pt positioned for comfort and educated to call for assist before getting up, pt verbalized understanding. Pt left with all needs met and call bell in reach. RN notified of position and participation.      Progression toward goals:   []      Improving appropriately and progressing toward goals  [x]      Improving slowly and progressing toward goals  []      Not making progress toward goals and plan of care will be adjusted     PLAN:  Patient continues to benefit from skilled intervention to address the above impairments. Continue treatment per established plan of care. Further Equipment Recommendations for Discharge:  rolling walker    AMPAC: Based on an AM-PAC score of 16/24 and their current functional mobility deficits, it is recommended that the patient have 3-5 sessions per week of Physical Therapy at d/c to increase the patient's independence. This AMPAC score should be considered in conjunction with interdisciplinary team recommendations to determine the most appropriate discharge setting. Patient's social support, diagnosis, medical stability, and prior level of function should also be taken into consideration. SUBJECTIVE:   Patient stated I want to get better at home.     OBJECTIVE DATA SUMMARY:   Critical Behavior:  Neurologic State: Alert  Orientation Level: Oriented X4  Cognition: Follows commands  Safety/Judgement: Fall prevention, Lack of insight into deficits  Functional Mobility Training:  Bed Mobility:     Supine to Sit: Stand-by assistance; Additional time;Bed Modified     Scooting: Contact guard assistance    Transfers:  Sit to Stand: Moderate assistance  Stand to Sit: Contact guard assistance    Balance:  Sitting: Intact  Sitting - Static: Good (unsupported)  Sitting - Dynamic: Good (unsupported)  Standing: Impaired; With support  Standing - Static: Good  Standing - Dynamic : Fair      Posture:   Posture (WDL): Within defined limits     Ambulation/Gait Training:  Distance (ft): 4 Feet (ft)  Assistive Device: Walker, rolling  Ambulation - Level of Assistance: Minimal assistance     Gait Description (WDL): Exceptions to WDL  Gait Abnormalities: Decreased step clearance    Base of Support: Narrowed     Speed/[place]: Shuffled; Slow  Step Length: Right shortened;Left shortened      Pain:  Pain level pre-treatment: 5/10  Pain level post-treatment: 5/10 Pain Intervention(s): Rest, Repositioning  Response to intervention: Nurse notified    Activity Tolerance:   Improving tolerance . Please refer to the flowsheet for vital signs taken during this treatment. After treatment:   [x] Patient left in no apparent distress sitting up in chair  [] Patient left in no apparent distress in bed  [x] Call bell left within reach  [x] Nursing notified  [] Caregiver present  [] Bed alarm activated  [] SCDs applied      COMMUNICATION/EDUCATION:   [x]         Role of Physical Therapy in the acute care setting. [x]         Fall prevention education was provided and the patient/caregiver indicated understanding. [x]         Patient/family have participated as able in working toward goals and plan of care. [x]         Patient/family agree to work toward stated goals and plan of care. []         Patient understands intent and goals of therapy, but is neutral about his/her participation. []         Patient is unable to participate in stated goals/plan of care: ongoing with therapy staff.  []         Other:        Sonja Jean, PT   Time Calculation: 31 mins    [facility] AM-PAC® Basic Mobility Inpatient Short Form (6-Clicks) Version 2    How much HELP from another person does the patient currently need    (If the patient hasn't done an activity recently, how much help from another person do you think he/she would need if he/she tried?)   Total (Total A or Dep)   A Lot  (Mod to Max A)   A Little (Sup or Min A)   None (Mod I to I)   Turning from your back to your side while in a flat bed without using bedrails? [] 1 [] 2 [x] 3 [] 4   2. Moving from lying on your back to sitting on the side of a flat bed without using bedrails? [] 1 [] 2 [x] 3 [] 4   3. Moving to and from a bed to a chair (including a wheelchair)? [] 1 [] 2 [x] 3 [] 4   4. Standing up from a chair using your arms (e.g., wheelchair, or bedside chair)? [] 1 [x] 2 [] 3 [] 4   5.  Walking in hospital room? [] 1 [] 2 [x] 3 [] 4   6. Climbing 3-5 steps with a railing?+   [] 1 [x] 2 [] 3 [] 4   +If stair climbing cannot be assessed, skip item #6. Sum responses from items 1-5.

## 2022-08-06 PROCEDURE — 77030041076 HC DRSG AG OPTICELL MDII -A

## 2022-08-06 PROCEDURE — 74011250637 HC RX REV CODE- 250/637: Performed by: HOSPITALIST

## 2022-08-06 PROCEDURE — 74011000250 HC RX REV CODE- 250: Performed by: INTERNAL MEDICINE

## 2022-08-06 PROCEDURE — 2709999900 HC NON-CHARGEABLE SUPPLY

## 2022-08-06 PROCEDURE — 99232 SBSQ HOSP IP/OBS MODERATE 35: CPT | Performed by: HOSPITALIST

## 2022-08-06 PROCEDURE — 0JD70ZZ EXTRACTION OF BACK SUBCUTANEOUS TISSUE AND FASCIA, OPEN APPROACH: ICD-10-PCS | Performed by: SURGERY

## 2022-08-06 PROCEDURE — 74011250637 HC RX REV CODE- 250/637: Performed by: INTERNAL MEDICINE

## 2022-08-06 PROCEDURE — 74011000258 HC RX REV CODE- 258: Performed by: EMERGENCY MEDICINE

## 2022-08-06 PROCEDURE — 99024 POSTOP FOLLOW-UP VISIT: CPT | Performed by: SURGERY

## 2022-08-06 PROCEDURE — 65270000046 HC RM TELEMETRY

## 2022-08-06 PROCEDURE — 74011250636 HC RX REV CODE- 250/636: Performed by: EMERGENCY MEDICINE

## 2022-08-06 PROCEDURE — 74011250636 HC RX REV CODE- 250/636: Performed by: INTERNAL MEDICINE

## 2022-08-06 RX ADMIN — PIPERACILLIN AND TAZOBACTAM 3.38 G: 3; .375 INJECTION, POWDER, FOR SOLUTION INTRAVENOUS at 01:47

## 2022-08-06 RX ADMIN — ACETAMINOPHEN 650 MG: 325 TABLET, FILM COATED ORAL at 09:20

## 2022-08-06 RX ADMIN — DAKIN'S SOLUTION 0.125% (QUARTER STRENGTH): 0.12 SOLUTION at 08:57

## 2022-08-06 RX ADMIN — SODIUM CHLORIDE, PRESERVATIVE FREE 10 ML: 5 INJECTION INTRAVENOUS at 21:11

## 2022-08-06 RX ADMIN — PIPERACILLIN AND TAZOBACTAM 3.38 G: 3; .375 INJECTION, POWDER, FOR SOLUTION INTRAVENOUS at 08:58

## 2022-08-06 RX ADMIN — Medication 5 MG: at 21:32

## 2022-08-06 RX ADMIN — DEXAMETHASONE 2 MG: 2 TABLET ORAL at 21:11

## 2022-08-06 RX ADMIN — Medication 2500 MCG: at 08:59

## 2022-08-06 RX ADMIN — SODIUM CHLORIDE, PRESERVATIVE FREE 10 ML: 5 INJECTION INTRAVENOUS at 13:16

## 2022-08-06 RX ADMIN — HYDRALAZINE HYDROCHLORIDE 25 MG: 50 TABLET, FILM COATED ORAL at 16:31

## 2022-08-06 RX ADMIN — GABAPENTIN 300 MG: 300 CAPSULE ORAL at 17:20

## 2022-08-06 RX ADMIN — OXYCODONE HYDROCHLORIDE AND ACETAMINOPHEN 1 TABLET: 5; 325 TABLET ORAL at 21:11

## 2022-08-06 RX ADMIN — GABAPENTIN 300 MG: 300 CAPSULE ORAL at 08:59

## 2022-08-06 RX ADMIN — HYDRALAZINE HYDROCHLORIDE 25 MG: 50 TABLET, FILM COATED ORAL at 08:59

## 2022-08-06 RX ADMIN — LEVOTHYROXINE SODIUM 88 MCG: 88 TABLET ORAL at 05:54

## 2022-08-06 RX ADMIN — DIVALPROEX SODIUM 500 MG: 500 TABLET, EXTENDED RELEASE ORAL at 08:59

## 2022-08-06 RX ADMIN — PYRIDOXINE HCL TAB 50 MG 200 MG: 50 TAB at 08:58

## 2022-08-06 RX ADMIN — AMLODIPINE BESYLATE 10 MG: 10 TABLET ORAL at 08:59

## 2022-08-06 RX ADMIN — POLYETHYLENE GLYCOL 3350 17 G: 17 POWDER, FOR SOLUTION ORAL at 09:20

## 2022-08-06 RX ADMIN — HYDRALAZINE HYDROCHLORIDE 25 MG: 50 TABLET, FILM COATED ORAL at 21:11

## 2022-08-06 RX ADMIN — SODIUM CHLORIDE, PRESERVATIVE FREE 10 ML: 5 INJECTION INTRAVENOUS at 05:58

## 2022-08-06 RX ADMIN — DAKIN'S SOLUTION 0.125% (QUARTER STRENGTH): 0.12 SOLUTION at 17:17

## 2022-08-06 RX ADMIN — PIPERACILLIN AND TAZOBACTAM 3.38 G: 3; .375 INJECTION, POWDER, FOR SOLUTION INTRAVENOUS at 16:31

## 2022-08-06 RX ADMIN — ATENOLOL 100 MG: 50 TABLET ORAL at 08:59

## 2022-08-06 RX ADMIN — DEXAMETHASONE 2 MG: 2 TABLET ORAL at 08:59

## 2022-08-06 NOTE — ROUTINE PROCESS
Bedside shift change report given to [facility] RN (oncoming nurse) by Jonny Noyola RN (offgoing nurse). Report included the following information SBAR, Kardex, Procedure Summary, Intake/Output, and MAR.

## 2022-08-06 NOTE — PROGRESS NOTES
Problem: Falls - Risk of  Goal: *Absence of Falls  Description: Document Art Reason Fall Risk and appropriate interventions in the flowsheet. Outcome: Progressing Towards Goal  Note: Fall Risk Interventions:  Mobility Interventions: Assess mobility with egress test, Patient to call before getting OOB         Medication Interventions: Patient to call before getting OOB, Teach patient to arise slowly    Elimination Interventions: Bed/chair exit alarm, Call light in reach              Problem: Patient Education: Go to Patient Education Activity  Goal: Patient/Family Education  Outcome: Progressing Towards Goal     Problem: Pressure Injury - Risk of  Goal: *Prevention of pressure injury  Description: Document Bret Scale and appropriate interventions in the flowsheet.   Outcome: Progressing Towards Goal  Note: Pressure Injury Interventions:  Sensory Interventions: Assess changes in LOC    Moisture Interventions: Absorbent underpads, Maintain skin hydration (lotion/cream)    Activity Interventions: Assess need for specialty bed, Increase time out of bed, Pressure redistribution bed/mattress(bed type)    Mobility Interventions: Assess need for specialty bed, HOB 30 degrees or less, Pressure redistribution bed/mattress (bed type)    Nutrition Interventions: Document food/fluid/supplement intake    Friction and Shear Interventions: HOB 30 degrees or less                Problem: Patient Education: Go to Patient Education Activity  Goal: Patient/Family Education  Outcome: Progressing Towards Goal     Problem: Patient Education: Go to Patient Education Activity  Goal: Patient/Family Education  Outcome: Progressing Towards Goal     Problem: Patient Education: Go to Patient Education Activity  Goal: Patient/Family Education  Outcome: Progressing Towards Goal     Problem: Nutrition Deficit  Goal: *Optimize nutritional status  Outcome: Progressing Towards Goal     Problem: Pain  Goal: *Control of Pain  Outcome: Progressing Towards Goal     Problem: Patient Education: Go to Patient Education Activity  Goal: Patient/Family Education  Outcome: Progressing Towards Goal

## 2022-08-06 NOTE — PROGRESS NOTES
[facility]  [facility]    Patient: Luisito Lundberg Age: 74 y.o. : 1955 MR#: [id number] SSN: [id number]  Date/Time: 2022     Subjective:     Patient seen and evaluated, lying in bed, no acute distress. 57-year-old female presents with complaints of left foot blister and left ankle pain. Patient reports a blister on her left ankle which ruptured and drained serous fluid with a large blister on her left foot currently. Patient also has chronic wounds on her lower back. Patient was recently diagnosed with a brain mass for which she was supposed to undergo a brain mass biopsy for possible glioblastoma however it appears that she has not undergone that procedure yet. 8/3-patient seen and evaluated, lying in bed, no acute distress. Blood cultures positive for Bacteroides, wound culture shows heavy gram-negative rods, sensitivities still pending cultures. ID on board, continue broad-spectrum IV antibiotics. -patient seen and evaluated, sitting up in bed, no acute distress. Blood cultures positive-growing Bacteroides, likely beta-lactamase positive, ID on board for antibiotic therapy management. Culture from wound growing Klebsiella and Proteus mirabilis, continue IV Zosyn. CT abdomen and pelvis shows soft tissue stranding and ill-defined fluid in the right paralumbar region measuring 4.8 x 3.1 cm. ID recommended general surgery consultation, general surgery note reviewed, plan for bedside I&D in a.m.     -patient seen and evaluated, lying in bed, no acute distress. Patient worked with PT and OT today. Awaiting input from general surgery regarding right paralumbar region abscess. -patient seen and evaluated, lying in bed, no acute distress. Family at bedside. Appreciate general surgery input, patient underwent I&D of para lumbar region abscess, noted large abscess with increased purulence.   There was necrotic muscle, skin subcutaneous tissue to bone. May require wound VAC in future however given patient's underlying medical problems this is going to take a long time to heal.  Spoke with patient and her brothers at bedside, they verbalized understanding and are trying to convince patient about possible hospice    Assessment/Plan:     Sepsis 2y to Left foot blister, left ankle pain with chronic wounds on her lower back-continue broad-spectrum IV antibiotics, appreciate wound care input. ID consultation. CT abdomen and pelvis soft tissue stranding and ill-defined fluid collection along the right paralumbar region measuring 4.8 x 3.1 cm. General surgery consulted, patient underwent I&D with juanjose pus, necrotic muscle skin and subcutaneous tissue to bone. Bacteremia with wound cultures growing Klebsiella and Proteus mirabilis-continue IV antibiotics, will defer duration to infectious disease. Brain mass-patient currently has a neurosurgical appointment, was supposed to undergo biopsy for possible glioblastoma however it is currently pending. Continue dexamethasone 2 mg twice daily. Per patient's brothers she has been given timeframe of approximately 9 months to live. History of hypertension-resume home medications, monitor blood pressure  History of hypothyroidism-continue Synthroid  History of seizure disorder-continue gabapentin and Depakote  History of breast cancer-continue tamoxifen  DVT prophylaxis-Lovenox  Full code        I had a long discussion with the patient after I met with her brothers yesterday, patient is agreeable to go to skilled nursing facility. Continue current treatment plan, will follow. Will consult palliative care in a.. Andres Smith MD  08/06/22      Case discussed with:  [x]Patient  []Family  []Nursing  []Case Management  DVT Prophylaxis:  [x]Lovenox  []Hep SQ  []SCDs  []Coumadin   []On Heparin gtt    Objective:   VS: Visit Vitals  /73 (BP 1 Location: Right upper arm, BP Patient Position:  At rest)   Pulse 70   Temp 98.1 °F (36.7 °C)   Resp 16   Ht 5' 3\" (1.6 m)   Wt 83.3 kg (183 lb 9.6 oz)   SpO2 99%   BMI 32.52 kg/m²        Tmax/24hrs: Temp (24hrs), Av °F (36.7 °C), Min:97.6 °F (36.4 °C), Max:98.6 °F (37 °C)  IOBRIEFNo intake or output data in the 24 hours ending 22 1557      General:  Alert, cooperative, no acute distress, blind in right eye  Pulmonary:  CTA Bilaterally. No Wheezing/Rhonchi/Rales. Cardiovascular: Regular rate and Rhythm. GI:  Soft, Non distended, Non tender. + Bowel sounds. Extremities: Bilateral lower extremity edema with open wounds  Neurologic: Alert and oriented X 3. No acute neuro deficits.   Additional:    Medications:   Current Facility-Administered Medications   Medication Dose Route Frequency    sodium hypochlorite (QUARTER STRENGTH DAKIN'S) 0.125% irrigation (bottle)   Topical BID    melatonin (rapid dissolve) tablet 5 mg  5 mg Oral QHS PRN    piperacillin-tazobactam (ZOSYN) 3.375 g in 0.9% sodium chloride (MBP/ADV) 100 mL MBP  3.375 g IntraVENous Q8H    sodium chloride (NS) flush 5-40 mL  5-40 mL IntraVENous Q8H    sodium chloride (NS) flush 5-40 mL  5-40 mL IntraVENous PRN    acetaminophen (TYLENOL) tablet 650 mg  650 mg Oral Q6H PRN    Or    acetaminophen (TYLENOL) suppository 650 mg  650 mg Rectal Q6H PRN    polyethylene glycol (MIRALAX) packet 17 g  17 g Oral DAILY PRN    ondansetron (ZOFRAN ODT) tablet 4 mg  4 mg Oral Q8H PRN    Or    ondansetron (ZOFRAN) injection 4 mg  4 mg IntraVENous Q6H PRN    albuterol-ipratropium (DUO-NEB) 2.5 MG-0.5 MG/3 ML  3 mL Nebulization Q6H PRN    nitroglycerin (NITROBID) 2 % ointment 0.5 Inch  0.5 Inch Topical Q6H PRN    naloxone (NARCAN) injection 0.4 mg  0.4 mg IntraVENous EVERY 2 MINUTES AS NEEDED    amLODIPine (NORVASC) tablet 10 mg  10 mg Oral DAILY    cyanocobalamin (VITAMIN B12) sublingual tablet 2,500 mcg  2,500 mcg Oral DAILY    dexAMETHasone (DECADRON) tablet 2 mg  2 mg Oral Q12H    divalproex ER (DEPAKOTE ER) 24 hour tablet 500 mg  500 mg Oral DAILY    gabapentin (NEURONTIN) capsule 300 mg  300 mg Oral BID    hydrALAZINE (APRESOLINE) tablet 25 mg  25 mg Oral TID    levothyroxine (SYNTHROID) tablet 88 mcg  88 mcg Oral 6am    atenoloL (TENORMIN) tablet 100 mg  100 mg Oral DAILY    oxyCODONE-acetaminophen (PERCOCET) 5-325 mg per tablet 1 Tablet  1 Tablet Oral Q4H PRN    pyridoxine (vitamin B6) (VITAMIN B-6) tablet 200 mg  200 mg Oral DAILY       Imaging:   XR Results (most recent):  Results from Hospital Encounter encounter on 08/01/22    XR CHEST PORT    Narrative  EXAM: XR CHEST PORT    CLINICAL INDICATION/HISTORY: 74 years Female. chest pain. Additional History: None    TECHNIQUE: Frontal view of the chest    COMPARISON: Chest radiograph 3/2/2020    FINDINGS:    The cardiac silhouette appears within normal limits. Tortuosity of the thoracic  aorta, unchanged in appearance. Pulmonary vasculature appears within normal  limits. No confluent airspace opacity is appreciated. No definite evidence of  pleural effusion or pneumothorax. No acute osseous abnormality appreciated. Impression  No radiographic evidence of acute cardiopulmonary process. CT Results (most recent):  Results from Hospital Encounter encounter on 08/01/22    CT ABD PELV W CONT    Narrative  CT Abdomen And Pelvis with Intravenous Contrast    INDICATION: Lumbar soft tissue abscess. TECHNIQUE: 5 mm collimation axial images obtained from the diaphragm to the  level of the pubic symphysis following the uneventful administration of  100 cc  of low osmolar, nonionic intravenous contrast.    Dose reduction techniques used: Automated exposure control, adjustment of the  mAs and/or kVp according to patient size, standardized low-dose protocol, and/or  iterative reconstruction technique. COMPARISON: None. ABDOMEN FINDINGS:    Lung Bases: Bibasilar atelectasis. Small pleural effusions. Liver: Normal attenuation. No evidence for mass.     Gallbladder: Cholelithiasis. No biliary ductal dilatation. Pancreas: Normal attenuation without mass or ductal dilatation. Spleen: Normal in size. No evidence of mass. .    Adrenal Glands: No evidence for mass. Kidneys:    Right: Normal enhancement. No cortical mass. No hydronephrosis. Left:  Normal enhancement. Left renal cyst.  No hydronephrosis. Lymph Nodes: Upper abdominal lymph nodes. There is a 1.3 cm lymph node in the  jesse hepatis. Aorta:   Normal in caliber    PELVIS FINDINGS:    Bowel: Small Bowel: Normal in caliber with normal wall thickness. Large Bowel: Normal in caliber with normal wall thickness. Moderate stool  burden. Appendix: Normal appendix. Bladder: Underdistended. The uterus is surgically absent. There is no suspicious adnexal mass. There is no free air. There is a small volume of ascites. There is soft tissue stranding and some ill-defined fluid in the deep  subcutaneous tissues which measures approximately 4.8 x 3.1 cm. This is in the  right lateral lumbar region. Bones: No acute finding. Impression  Soft tissue stranding and ill-defined fluid in the right paralumbar region  measuring 4.8 x 3.1 cm. Small volume of ascites in the abdomen and pelvis. No definite acute bowel abnormality. Moderate stool burden. Additional incidentals as above. MRI Results (most recent):  Results from [place] encounter on 03/31/14    MRI BREAST BX VAC ASSIST RT    Addendum 4/3/2014  4:54 PM  Addendum: Pathology changes from right breast biopsy 12:00 position-  Fibrocystic changes with florid ductal epithelial hyperplasia. The findings are benign and concordant. Narrative  MR guided left breast biopsy    HISTORY: Left breast cancer, abnormal MRI    COMPARISON: MRI March 2014    FINDINGS:    Informed consent was obtained.   The risks of the procedure including but not  limited to bleeding and infection were reviewed with the patient who wished to  proceed. Preliminary pre and post contrast images were performed. 20cc  Magnevist IV was administered. With the patient positioned prone, and following placement in the compression  device, the left breast was imaged, and the lesion in the 12:00 left breast was  targeted. Appropriate coordinates records were obtained. The skin was  cleansed and local anesthesia administered. After making a small incision in  the skin with a # 11 scalpel blade, the trocar and biopsy sheath were advanced  to the appropriate depth. After confirmation of appropriate positioning the 9  gauge 20 mm vacuum assisted biopsy needle was advanced to the appropriate  depth. Subsequently a series of approximately 12 vacuum assisted samples were  acquired in a radial fashion. Prior to removal of the sheath, using coaxial technique, a BioData  cylinder shaped clip was placed to oscar area sampled. The patient was removed from the biopsy device, the wound was dressed and post  biopsy instructions were given to the patient. The samples were sent to the  laboratory for analysis. Diagnostic Mammogram left Breast: CC and MLO views were obtained of the left  breast and document the clip at the site of sampling in the 12:00 left breast.    Complications: No complications. Specimen: 12 vacuum assisted samples  Assistant: None. EBL: 5 cc    Impression  :    Status post MRI guided biopsy of an abnormal area of enhancement in the 12:00  left breast.  Area sampled marked with a     clip. Samples sent to the  laboratory for analysis.         Labs:    Recent Results (from the past 48 hour(s))   CBC W/O DIFF    Collection Time: 08/05/22  3:46 AM   Result Value Ref Range    WBC 6.4 4.6 - 13.2 K/uL    RBC 3.61 (L) 4.20 - 5.30 M/uL    HGB 10.9 (L) 12.0 - 16.0 g/dL    HCT 32.6 (L) 35.0 - 45.0 %    MCV 90.3 78.0 - 100.0 FL    MCH 30.2 24.0 - 34.0 PG    MCHC 33.4 31.0 - 37.0 g/dL    RDW 13.6 11.6 - 14.5 %    PLATELET 228 (L) 953 - 420 K/uL    MPV 10.9 9.2 - 11.8 FL    NRBC 0.0 0  WBC    ABSOLUTE NRBC 0.00 0.00 - 0.01 K/uL   CULTURE, WOUND W GRAM STAIN    Collection Time: 08/05/22  6:23 PM    Specimen: Back; Wound   Result Value Ref Range    Special Requests: NO SPECIAL REQUESTS      GRAM STAIN FEW GRAM NEGATIVE RODS      Culture result: PENDING        Signed By: Dhara Barr MD     August 6, 2022      I spent 25 minutes with the patient in face-to-face consultation, of which greater than 50% was spent in counseling and coordination of care as described above    Disclaimer: Sections of this note are dictated using utilizing voice recognition software. Minor typographical errors may be present. If questions arise, please do not hesitate to contact me or call our department.

## 2022-08-06 NOTE — PROGRESS NOTES
Wound Prevention Checklist    Patient: Azeem Garcia (45 y.o. female)  Date: 8/6/2022  Diagnosis: Sepsis ([facility] [address] [A41.9]  Leukocytosis [D72.829]  Open wound of lower back [S31.000A] Severe sepsis ([facility] [address]    Precautions: Fall, Skin       [x]  Heel prevention boots placed on patient    [x]  Patient turned q2h during shift    []  Lift team ordered    [x]  Patient on [place] bed/Specialty bed    [x]  Each Wound is documented during shift (Stage, Color, drainage, odor, measurements, and dressings)    [x]  Dual skin checks done at bedside during shift report with Malina Mcneill RN

## 2022-08-06 NOTE — ROUTINE PROCESS
Bedside shift change report given to CRYSTAL Crowder (oncoming nurse) by Marissa Marcial RN (offgoing nurse). Report included the following information SBAR, Kardex, MAR, and Cardiac Rhythm NSR .

## 2022-08-06 NOTE — PROGRESS NOTES
Infectious Disease progress Note        Reason: Gram-negative bloodstream infection    Current abx Prior abx   Zosyn since 8/1/2022  levofloxacin, vancomycin 8/1-8/3     Lines:       Assessment :     74 y.o. female with PMHx significant for htn, seizures, breast cancer, recently diagnosed glioblastoma with vasogenic edema (7/2022), recent hospitalization at [facility] who presented to [facility] ER on 8/1/22 with complaint of Left Foot Blister and Left Ankle Pain. [facility] AT [place] 7/1-7/4 for fall, diagnosed to have glioblastoma multiforme with mass effect s/p decadron    Hospitalization at [address] [facility] 7/14/22-7/22/22 for fall, AMS (declined hospice during that admission)    Clinical presentation concerning for sepsis-present on admission due to Bacteroides bloodstream infection. Exact source of Bacteroides bloodstream infection not entirely clear at this time. Bacteroides usually originates from GI tract or necrotic soft tissue infection. No evidence of GI infection noted clinically or per CT scan 8/4/2022  significant necrotic lesion in the back. CT scan reveals 4.8 x 3.1 cm fluid collection in the right lateral lumbar region- ?  Lumbar soft tissue abscess as a source of Bacteroides bloodstream infection. Immunocompromise state due to concurrent steroids can mask the full clinical presentation    Surgery follow up appreciated. Plans for bedside I&D noted    Necrotic lower back ulcers, pressure necrosis right heel-likely due to prolonged period of unresponsiveness/pressure injury July 2022    Left foot blister, left leg ulcer-no clinical evidence of infection noted on today's exam.  Wound culture 8/1/2022-Klebsiella, proteus, e.coli, MSSA-likely colonizer    Glioblastoma multiforme-plans for outpatient neurosurgery noted.   Currently on Decadron    Recommendations:    Continue piperacillin/tazobactam.    Follow up repeat blood culture today to determine clearance of bloodstream infection  Await bedside I&D lumbar soft tissue fluid collection to evaluate for abscess   Follow-up psych recommendations  Continue local wound care left leg ulcer, pressure offloading right heel, wound care back ulcers    Addendum: 20:40 pm  S/p bedside I&D. Surgery help appreciated. grossly necrotic wounds with juanjose pus to bone-concur with dr. Alisson Jean Baptiste. Poor chances of wound healing looking at the extent of infection, need for steroids. Follow up  wound cx & modify abx accordingly. Recommend palliative care consult to discuss goals of care. Above plan was discussed in details with patient, RN and dr Arsenio Serna Please call me if any further questions or concerns. Will continue to participate in the care of this patient. HPI:    Feels better. She denies any worsening back pain at the site of back ulcer. Denies any abdominal pain, diarrhea, constipation, nausea, vomiting. 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    Cancer ([address] 01/01/2014    breast    Ductal carcinoma in situ of breast     Functional quadriplegia ([address]     as of 7/2022    Glaucoma     History of seizure disorder     Open back wound 07/2022    Pressure wound for lying on a table for indeterminate amount of time    Pituitary adenoma with extrasellar extension ([address]     Primary hypertension        Past Surgical History:   Procedure Laterality Date    HX HYSTERECTOMY      partial    HX MASTECTOMY  4/30/2014    RIGHT PARTIAL MASTECTOMY WITH NEEDLE LOCALIZATION MAMMOGRAM SENTINEL LYMPH NODE BIOPSY performed by Corrine Doran MD at [address] [facility] Medication List      Details   amLODIPine (NORVASC) 10 mg tablet Take 10 mg by mouth in the morning. dexAMETHasone (DECADRON) 2 mg tablet Take  by mouth every twelve (12) hours. levothyroxine (SYNTHROID) 88 mcg tablet Take 88 mcg by mouth Daily (before breakfast).       divalproex ER (DEPAKOTE ER) 500 mg ER tablet Take 500 mg by mouth. hydrALAZINE (APRESOLINE) 25 mg tablet Take 25 mg by mouth three (3) times daily. gabapentin (NEURONTIN) 300 mg capsule Take 1 tab PO QHS x1 week then increase to BID thereafter  Qty: 60 Cap, Refills: 1      baclofen (LIORESAL) 10 mg tablet Take 1 Tab by mouth three (3) times daily. Qty: 90 Tab, Refills: 0      tamoxifen (NOLVADEX) 10 mg tablet Take  by mouth daily. oxyCODONE-acetaminophen (PERCOCET) 5-325 mg per tablet 1 or 2 tablets by mouth every 4 hours as needed  Qty: 40 Tab, Refills: 0      !! OTHER Vitamin D Po      nebivoloL (BYSTOLIC) 20 mg tablet Take  by mouth daily. torsemide (DEMADEX) 20 mg tablet Take  by mouth daily. !! OTHER [place] eye vitamin OTC      cyanocobalamin (VITAMIN B12) 2,500 mcg sublingual tablet Take 2,500 mcg by mouth daily. pyridoxine, vitamin B6, 200 mg tablet Take 200 mg by mouth daily. BIOTIN PO Take 1,000 mg by mouth. diazePAM (VALIUM) 10 mg tablet TAKE 1 TAB PO 30 MINS PRIOR TO MRI (MUST HAVE SOMEONE TO DRIVE TO AND FROM THE FACILITY)  Qty: 1 Tab, Refills: 0    Associated Diagnoses: Lumbar radiculitis; Lumbar spondylosis      diclofenac (VOLTAREN) 1 % gel QID to affected areas PRN for pain  Qty: 500 g, Refills: 5       !! - Potential duplicate medications found. Please discuss with provider.           Current Facility-Administered Medications   Medication Dose Route Frequency    sodium hypochlorite (QUARTER STRENGTH DAKIN'S) 0.125% irrigation (bottle)   Topical BID    melatonin (rapid dissolve) tablet 5 mg  5 mg Oral QHS PRN    sodium chloride (NS) flush 5-10 mL  5-10 mL IntraVENous PRN    piperacillin-tazobactam (ZOSYN) 3.375 g in 0.9% sodium chloride (MBP/ADV) 100 mL MBP  3.375 g IntraVENous Q8H    sodium chloride (NS) flush 5-40 mL  5-40 mL IntraVENous Q8H    sodium chloride (NS) flush 5-40 mL  5-40 mL IntraVENous PRN    acetaminophen (TYLENOL) tablet 650 mg  650 mg Oral Q6H PRN    Or acetaminophen (TYLENOL) suppository 650 mg  650 mg Rectal Q6H PRN    polyethylene glycol (MIRALAX) packet 17 g  17 g Oral DAILY PRN    ondansetron (ZOFRAN ODT) tablet 4 mg  4 mg Oral Q8H PRN    Or    ondansetron (ZOFRAN) injection 4 mg  4 mg IntraVENous Q6H PRN    albuterol-ipratropium (DUO-NEB) 2.5 MG-0.5 MG/3 ML  3 mL Nebulization Q6H PRN    nitroglycerin (NITROBID) 2 % ointment 0.5 Inch  0.5 Inch Topical Q6H PRN    naloxone (NARCAN) injection 0.4 mg  0.4 mg IntraVENous EVERY 2 MINUTES AS NEEDED    amLODIPine (NORVASC) tablet 10 mg  10 mg Oral DAILY    cyanocobalamin (VITAMIN B12) sublingual tablet 2,500 mcg  2,500 mcg Oral DAILY    dexAMETHasone (DECADRON) tablet 2 mg  2 mg Oral Q12H    divalproex ER (DEPAKOTE ER) 24 hour tablet 500 mg  500 mg Oral DAILY    gabapentin (NEURONTIN) capsule 300 mg  300 mg Oral BID    hydrALAZINE (APRESOLINE) tablet 25 mg  25 mg Oral TID    levothyroxine (SYNTHROID) tablet 88 mcg  88 mcg Oral 6am    atenoloL (TENORMIN) tablet 100 mg  100 mg Oral DAILY    oxyCODONE-acetaminophen (PERCOCET) 5-325 mg per tablet 1 Tablet  1 Tablet Oral Q4H PRN    pyridoxine (vitamin B6) (VITAMIN B-6) tablet 200 mg  200 mg Oral DAILY       Allergies: Patient has no known allergies.     Family History   Problem Relation Age of Onset    Colon Cancer Mother      Social History     Socioeconomic History    Marital status:      Spouse name: Not on file    Number of children: Not on file    Years of education: Not on file    Highest education level: Not on file   Occupational History    Not on file   Tobacco Use    Smoking status: Never    Smokeless tobacco: Never   Substance and Sexual Activity    Alcohol use: No     Alcohol/week: 0.0 standard drinks    Drug use: No    Sexual activity: Not on file   Other Topics Concern    Not on file   Social History Narrative    Not on file     Social Determinants of Health     Financial Resource Strain: Not on file   Food Insecurity: Not on file   Transportation Needs: Not on file   Physical Activity: Not on file   Stress: Not on file   Social Connections: Not on file   Intimate Partner Violence: Not on file   Housing Stability: Not on file     Social History     Tobacco Use   Smoking Status Never   Smokeless Tobacco Never        Temp (24hrs), Av °F (36.7 °C), Min:97.6 °F (36.4 °C), Max:98.6 °F (37 °C)    Visit Vitals  /68   Pulse 82   Temp 98.1 °F (36.7 °C)   Resp 16   Ht 5' 3\" (1.6 m)   Wt 83.3 kg (183 lb 9.6 oz)   SpO2 98%   BMI 32.52 kg/m²       ROS: 12 point ROS obtained in details. Pertinent positives as mentioned in HPI,   otherwise negative    Physical Exam:    General:  female patient laying on the bed AAOx3 in no acute distress. General:   awake alert and oriented   HEENT:  Normocephalic, atraumatic, right eye whitish cornea,  no scleral icterus or pallor; no conjunctival hemmohage;  nasal and oral mucous are moist and without evidence of lesions. Dentition good. Neck supple, no bruits. Lymph Nodes:   Not examined   Lungs:   non-labored, bilateral chest movements equal, no audible wheezing   Heart:  RRR, s1 and s2; no  rubs or gallops, no edema   Abdomen:  soft, non-distended,  no hepatomegaly, no splenomegaly. Non-tender   Genitourinary:  No galvez   Extremities:   no clubbing, cyanosis; superficial pressure necrosis right heel, blister on dorsal aspect of left foot-no surrounding erythema. Superficial ulceration medial aspect of right leg with red granulation tissue-no purulent drainage/no surrounding erythema, muscle mass appropriate for age   Neurologic:  No gross focal sensory abnormalities; 5/5 muscle strength to upper and lower extremities. Speech appropriate. Cranial nerves intact                        Skin:  Skin changes bilateral lower extremity as mentioned above.   Linear ulceration lumbar region with dry necrotic tissue-no surrounding erythema, no purulent drainage; dry necrotic ulcer upper back; superficial ulcer right buttock- no drainage   Back:  no spinal or paraspinal muscle tenderness or rigidity, no CVA tenderness; skin changes as mentioned above     Psychiatric:  No suicidal or homicidal ideations, appropriate mood and affect         Labs: Results:   Chemistry Recent Labs     08/04/22  0231   *      K 4.0      CO2 26   BUN 17   CREA 1.06   CA 8.8   AGAP 7   BUCR 16        CBC w/Diff Recent Labs     08/05/22  0346   WBC 6.4   RBC 3.61*   HGB 10.9*   HCT 32.6*   *        Microbiology Recent Labs     08/03/22  0736   CULT NO GROWTH 2 DAYS            RADIOLOGY:    All available imaging studies/reports in [facility] for this admission were reviewed    Total time spent >35 minutes. High complexity decision making was performed during the evaluation of this patient at high risk for decompensation     Above mentioned total time spent on reviewing the case/medical record/data/notes/EMR/patient examination/documentation/coordinating care with nurse/consultants, exclusive of procedures with complex decision making performed and > 50% time spent in face to face evaluation. Disclaimer: Sections of this note are dictated utilizing voice recognition software, which may have resulted in some phonetic based errors in grammar and contents. Even though attempts were made to correct all the mistakes, some may have been missed, and remained in the body of the document. If questions arise, please contact our department.     Dr. Mi Mayo, Infectious Disease Specialist  [phone]  August 5, 2022  7:03 AM

## 2022-08-07 LAB
BACTERIA SPEC CULT: NORMAL
SERVICE CMNT-IMP: NORMAL

## 2022-08-07 PROCEDURE — 99232 SBSQ HOSP IP/OBS MODERATE 35: CPT | Performed by: HOSPITALIST

## 2022-08-07 PROCEDURE — 74011250637 HC RX REV CODE- 250/637: Performed by: HOSPITALIST

## 2022-08-07 PROCEDURE — 74011250637 HC RX REV CODE- 250/637: Performed by: INTERNAL MEDICINE

## 2022-08-07 PROCEDURE — 74011250636 HC RX REV CODE- 250/636: Performed by: INTERNAL MEDICINE

## 2022-08-07 PROCEDURE — 65270000046 HC RM TELEMETRY

## 2022-08-07 PROCEDURE — 74011250636 HC RX REV CODE- 250/636: Performed by: EMERGENCY MEDICINE

## 2022-08-07 PROCEDURE — 74011000258 HC RX REV CODE- 258: Performed by: EMERGENCY MEDICINE

## 2022-08-07 PROCEDURE — 74011000250 HC RX REV CODE- 250: Performed by: INTERNAL MEDICINE

## 2022-08-07 PROCEDURE — 2709999900 HC NON-CHARGEABLE SUPPLY

## 2022-08-07 RX ADMIN — ATENOLOL 100 MG: 50 TABLET ORAL at 09:45

## 2022-08-07 RX ADMIN — Medication 2500 MCG: at 09:45

## 2022-08-07 RX ADMIN — DEXAMETHASONE 2 MG: 2 TABLET ORAL at 09:45

## 2022-08-07 RX ADMIN — AMLODIPINE BESYLATE 10 MG: 10 TABLET ORAL at 09:45

## 2022-08-07 RX ADMIN — DEXAMETHASONE 2 MG: 2 TABLET ORAL at 22:15

## 2022-08-07 RX ADMIN — GABAPENTIN 300 MG: 300 CAPSULE ORAL at 18:15

## 2022-08-07 RX ADMIN — LEVOTHYROXINE SODIUM 88 MCG: 88 TABLET ORAL at 05:03

## 2022-08-07 RX ADMIN — SODIUM CHLORIDE, PRESERVATIVE FREE 10 ML: 5 INJECTION INTRAVENOUS at 22:15

## 2022-08-07 RX ADMIN — DAKIN'S SOLUTION 0.125% (QUARTER STRENGTH): 0.12 SOLUTION at 15:33

## 2022-08-07 RX ADMIN — OXYCODONE HYDROCHLORIDE AND ACETAMINOPHEN 1 TABLET: 5; 325 TABLET ORAL at 01:19

## 2022-08-07 RX ADMIN — HYDRALAZINE HYDROCHLORIDE 25 MG: 50 TABLET, FILM COATED ORAL at 22:15

## 2022-08-07 RX ADMIN — PIPERACILLIN AND TAZOBACTAM 3.38 G: 3; .375 INJECTION, POWDER, FOR SOLUTION INTRAVENOUS at 18:19

## 2022-08-07 RX ADMIN — SODIUM CHLORIDE, PRESERVATIVE FREE 8 ML: 5 INJECTION INTRAVENOUS at 09:45

## 2022-08-07 RX ADMIN — PIPERACILLIN AND TAZOBACTAM 3.38 G: 3; .375 INJECTION, POWDER, FOR SOLUTION INTRAVENOUS at 01:19

## 2022-08-07 RX ADMIN — HYDRALAZINE HYDROCHLORIDE 25 MG: 50 TABLET, FILM COATED ORAL at 09:45

## 2022-08-07 RX ADMIN — HYDRALAZINE HYDROCHLORIDE 25 MG: 50 TABLET, FILM COATED ORAL at 18:15

## 2022-08-07 RX ADMIN — OXYCODONE HYDROCHLORIDE AND ACETAMINOPHEN 1 TABLET: 5; 325 TABLET ORAL at 22:15

## 2022-08-07 RX ADMIN — OXYCODONE HYDROCHLORIDE AND ACETAMINOPHEN 1 TABLET: 5; 325 TABLET ORAL at 09:58

## 2022-08-07 RX ADMIN — GABAPENTIN 300 MG: 300 CAPSULE ORAL at 09:45

## 2022-08-07 RX ADMIN — OXYCODONE HYDROCHLORIDE AND ACETAMINOPHEN 1 TABLET: 5; 325 TABLET ORAL at 18:15

## 2022-08-07 RX ADMIN — DIVALPROEX SODIUM 500 MG: 500 TABLET, EXTENDED RELEASE ORAL at 09:45

## 2022-08-07 RX ADMIN — OXYCODONE HYDROCHLORIDE AND ACETAMINOPHEN 1 TABLET: 5; 325 TABLET ORAL at 05:03

## 2022-08-07 RX ADMIN — PYRIDOXINE HCL TAB 50 MG 200 MG: 50 TAB at 09:44

## 2022-08-07 RX ADMIN — SODIUM CHLORIDE, PRESERVATIVE FREE 10 ML: 5 INJECTION INTRAVENOUS at 05:03

## 2022-08-07 RX ADMIN — PIPERACILLIN AND TAZOBACTAM 3.38 G: 3; .375 INJECTION, POWDER, FOR SOLUTION INTRAVENOUS at 09:45

## 2022-08-07 RX ADMIN — Medication 5 MG: at 22:15

## 2022-08-07 NOTE — PROGRESS NOTES
[facility]  Progress Note    Patient: Silvia Terrell Age: 74 y.o. : 1955 MR#: [id number] SSN: [id number]  Date/Time: 2022     Subjective:     Patient seen and evaluated, lying in bed, no acute distress. 78-year-old female presents with complaints of left foot blister and left ankle pain. Patient reports a blister on her left ankle which ruptured and drained serous fluid with a large blister on her left foot currently. Patient also has chronic wounds on her lower back. Patient was recently diagnosed with a brain mass for which she was supposed to undergo a brain mass biopsy for possible glioblastoma however it appears that she has not undergone that procedure yet. 8/3-patient seen and evaluated, lying in bed, no acute distress. Blood cultures positive for Bacteroides, wound culture shows heavy gram-negative rods, sensitivities still pending cultures. ID on board, continue broad-spectrum IV antibiotics. -patient seen and evaluated, sitting up in bed, no acute distress. Blood cultures positive-growing Bacteroides, likely beta-lactamase positive, ID on board for antibiotic therapy management. Culture from wound growing Klebsiella and Proteus mirabilis, continue IV Zosyn. CT abdomen and pelvis shows soft tissue stranding and ill-defined fluid in the right paralumbar region measuring 4.8 x 3.1 cm. ID recommended general surgery consultation, general surgery note reviewed, plan for bedside I&D in a.m.     -patient seen and evaluated, lying in bed, no acute distress. Patient worked with PT and OT today. Awaiting input from general surgery regarding right paralumbar region abscess. -patient seen and evaluated, lying in bed, no acute distress. Family at bedside. Appreciate general surgery input, patient underwent I&D of para lumbar region abscess, noted large abscess with increased purulence.   There was necrotic muscle, skin subcutaneous tissue to bone. May require wound VAC in future however given patient's underlying medical problems this is going to take a long time to heal.  Spoke with patient and her brothers at bedside, they verbalized understanding and are trying to convince patient about possible hospice    8/7 - No new events overnight , continue current Rx Plan     Assessment/Plan:     Sepsis 2y to Left foot blister, left ankle pain with chronic wounds on her lower back-continue broad-spectrum IV antibiotics, appreciate wound care input. ID consultation. CT abdomen and pelvis soft tissue stranding and ill-defined fluid collection along the right paralumbar region measuring 4.8 x 3.1 cm. General surgery consulted, patient underwent I&D with juanjose pus, necrotic muscle skin and subcutaneous tissue to bone. Bacteremia with wound cultures growing Klebsiella and Proteus mirabilis-continue IV antibiotics, will defer duration to infectious disease. Brain mass-patient currently has a neurosurgical appointment, was supposed to undergo biopsy for possible glioblastoma however it is currently pending. Continue dexamethasone 2 mg twice daily. Per patient's brothers she has been given timeframe of approximately 9 months to live. History of hypertension-resume home medications, monitor blood pressure  History of hypothyroidism-continue Synthroid  History of seizure disorder-continue gabapentin and Depakote  History of breast cancer-continue tamoxifen  DVT prophylaxis-Lovenox  Full code        I had a long discussion with the patient after I met with her brothers yesterday, patient is agreeable to go to skilled nursing facility. Continue current treatment plan, will follow. Will consult palliative care in a..       Sarah Whitten MD  08/07/22      Case discussed with:  [x]Patient  []Family  []Nursing  []Case Management  DVT Prophylaxis:  [x]Lovenox  []Hep SQ  []SCDs  []Coumadin   []On Heparin gtt    Objective:   VS: Visit Vitals  BP (!) 146/81 (BP 1 Location: Right upper arm, BP Patient Position: At rest)   Pulse 71   Temp 98 °F (36.7 °C)   Resp 17   Ht 5' 3\" (1.6 m)   Wt 82.1 kg (180 lb 14.4 oz)   SpO2 100%   BMI 32.04 kg/m²        Tmax/24hrs: Temp (24hrs), Av °F (36.7 °C), Min:97.7 °F (36.5 °C), Max:98.1 °F (36.7 °C)  IOBRIEF  Intake/Output Summary (Last 24 hours) at 2022 1343  Last data filed at 2022 0215  Gross per 24 hour   Intake 380 ml   Output 500 ml   Net -120 ml         General:  Alert, cooperative, no acute distress, blind in right eye  Pulmonary:  CTA Bilaterally. No Wheezing/Rhonchi/Rales. Cardiovascular: Regular rate and Rhythm. GI:  Soft, Non distended, Non tender. + Bowel sounds. Extremities: Bilateral lower extremity edema with open wounds  Neurologic: Alert and oriented X 3. No acute neuro deficits.   Additional:    Medications:   Current Facility-Administered Medications   Medication Dose Route Frequency    collagenase (SANTYL) 250 unit/gram ointment   Topical BID    sodium hypochlorite (QUARTER STRENGTH DAKIN'S) 0.125% irrigation (bottle)   Topical BID    melatonin (rapid dissolve) tablet 5 mg  5 mg Oral QHS PRN    piperacillin-tazobactam (ZOSYN) 3.375 g in 0.9% sodium chloride (MBP/ADV) 100 mL MBP  3.375 g IntraVENous Q8H    sodium chloride (NS) flush 5-40 mL  5-40 mL IntraVENous Q8H    sodium chloride (NS) flush 5-40 mL  5-40 mL IntraVENous PRN    acetaminophen (TYLENOL) tablet 650 mg  650 mg Oral Q6H PRN    Or    acetaminophen (TYLENOL) suppository 650 mg  650 mg Rectal Q6H PRN    polyethylene glycol (MIRALAX) packet 17 g  17 g Oral DAILY PRN    ondansetron (ZOFRAN ODT) tablet 4 mg  4 mg Oral Q8H PRN    Or    ondansetron (ZOFRAN) injection 4 mg  4 mg IntraVENous Q6H PRN    albuterol-ipratropium (DUO-NEB) 2.5 MG-0.5 MG/3 ML  3 mL Nebulization Q6H PRN    nitroglycerin (NITROBID) 2 % ointment 0.5 Inch  0.5 Inch Topical Q6H PRN    naloxone (NARCAN) injection 0.4 mg  0.4 mg IntraVENous EVERY 2 MINUTES AS NEEDED amLODIPine (NORVASC) tablet 10 mg  10 mg Oral DAILY    cyanocobalamin (VITAMIN B12) sublingual tablet 2,500 mcg  2,500 mcg Oral DAILY    dexAMETHasone (DECADRON) tablet 2 mg  2 mg Oral Q12H    divalproex ER (DEPAKOTE ER) 24 hour tablet 500 mg  500 mg Oral DAILY    gabapentin (NEURONTIN) capsule 300 mg  300 mg Oral BID    hydrALAZINE (APRESOLINE) tablet 25 mg  25 mg Oral TID    levothyroxine (SYNTHROID) tablet 88 mcg  88 mcg Oral 6am    atenoloL (TENORMIN) tablet 100 mg  100 mg Oral DAILY    oxyCODONE-acetaminophen (PERCOCET) 5-325 mg per tablet 1 Tablet  1 Tablet Oral Q4H PRN    pyridoxine (vitamin B6) (VITAMIN B-6) tablet 200 mg  200 mg Oral DAILY       Imaging:   XR Results (most recent):  Results from Hospital Encounter encounter on 08/01/22    XR CHEST PORT    Narrative  EXAM: XR CHEST PORT    CLINICAL INDICATION/HISTORY: 74 years Female. chest pain. Additional History: None    TECHNIQUE: Frontal view of the chest    COMPARISON: Chest radiograph 3/2/2020    FINDINGS:    The cardiac silhouette appears within normal limits. Tortuosity of the thoracic  aorta, unchanged in appearance. Pulmonary vasculature appears within normal  limits. No confluent airspace opacity is appreciated. No definite evidence of  pleural effusion or pneumothorax. No acute osseous abnormality appreciated. Impression  No radiographic evidence of acute cardiopulmonary process. CT Results (most recent):  Results from Hospital Encounter encounter on 08/01/22    CT ABD PELV W CONT    Narrative  CT Abdomen And Pelvis with Intravenous Contrast    INDICATION: Lumbar soft tissue abscess. TECHNIQUE: 5 mm collimation axial images obtained from the diaphragm to the  level of the pubic symphysis following the uneventful administration of  100 cc  of low osmolar, nonionic intravenous contrast.    Dose reduction techniques used:  Automated exposure control, adjustment of the  mAs and/or kVp according to patient size, standardized low-dose protocol, and/or  iterative reconstruction technique. COMPARISON: None. ABDOMEN FINDINGS:    Lung Bases: Bibasilar atelectasis. Small pleural effusions. Liver: Normal attenuation. No evidence for mass. Gallbladder: Cholelithiasis. No biliary ductal dilatation. Pancreas: Normal attenuation without mass or ductal dilatation. Spleen: Normal in size. No evidence of mass. .    Adrenal Glands: No evidence for mass. Kidneys:    Right: Normal enhancement. No cortical mass. No hydronephrosis. Left:  Normal enhancement. Left renal cyst.  No hydronephrosis. Lymph Nodes: Upper abdominal lymph nodes. There is a 1.3 cm lymph node in the  jesse hepatis. Aorta:   Normal in caliber    PELVIS FINDINGS:    Bowel: Small Bowel: Normal in caliber with normal wall thickness. Large Bowel: Normal in caliber with normal wall thickness. Moderate stool  burden. Appendix: Normal appendix. Bladder: Underdistended. The uterus is surgically absent. There is no suspicious adnexal mass. There is no free air. There is a small volume of ascites. There is soft tissue stranding and some ill-defined fluid in the deep  subcutaneous tissues which measures approximately 4.8 x 3.1 cm. This is in the  right lateral lumbar region. Bones: No acute finding. Impression  Soft tissue stranding and ill-defined fluid in the right paralumbar region  measuring 4.8 x 3.1 cm. Small volume of ascites in the abdomen and pelvis. No definite acute bowel abnormality. Moderate stool burden. Additional incidentals as above. MRI Results (most recent):  Results from [place] encounter on 03/31/14    MRI BREAST BX VAC ASSIST RT    Addendum 4/3/2014  4:54 PM  Addendum: Pathology changes from right breast biopsy 12:00 position-  Fibrocystic changes with florid ductal epithelial hyperplasia. The findings are benign and concordant.     Narrative  MR guided left breast biopsy    HISTORY: Left breast cancer, abnormal MRI    COMPARISON: MRI March 2014    FINDINGS:    Informed consent was obtained. The risks of the procedure including but not  limited to bleeding and infection were reviewed with the patient who wished to  proceed. Preliminary pre and post contrast images were performed. 20cc  Magnevist IV was administered. With the patient positioned prone, and following placement in the compression  device, the left breast was imaged, and the lesion in the 12:00 left breast was  targeted. Appropriate coordinates records were obtained. The skin was  cleansed and local anesthesia administered. After making a small incision in  the skin with a # 11 scalpel blade, the trocar and biopsy sheath were advanced  to the appropriate depth. After confirmation of appropriate positioning the 9  gauge 20 mm vacuum assisted biopsy needle was advanced to the appropriate  depth. Subsequently a series of approximately 12 vacuum assisted samples were  acquired in a radial fashion. Prior to removal of the sheath, using coaxial technique, a [employer]  cylinder shaped clip was placed to socar area sampled. The patient was removed from the biopsy device, the wound was dressed and post  biopsy instructions were given to the patient. The samples were sent to the  laboratory for analysis. Diagnostic Mammogram left Breast: CC and MLO views were obtained of the left  breast and document the clip at the site of sampling in the 12:00 left breast.    Complications: No complications. Specimen: 12 vacuum assisted samples  Assistant: None. EBL: 5 cc    Impression  :    Status post MRI guided biopsy of an abnormal area of enhancement in the 12:00  left breast.  Area sampled marked with a     clip. Samples sent to the  laboratory for analysis. Labs:    Recent Results (from the past 48 hour(s))   CULTURE, WOUND Nia Amato STAIN    Collection Time: 08/05/22  6:23 PM    Specimen: Back;  Wound   Result Value Ref Range Special Requests: NO SPECIAL REQUESTS      GRAM STAIN FEW GRAM NEGATIVE RODS      Culture result: LIGHT GRAM NEGATIVE RODS (A)         Signed By: Tone Felder MD     August 7, 2022      I spent 25 minutes with the patient in face-to-face consultation, of which greater than 50% was spent in counseling and coordination of care as described above    Disclaimer: Sections of this note are dictated using utilizing voice recognition software. Minor typographical errors may be present. If questions arise, please do not hesitate to contact me or call our department.

## 2022-08-07 NOTE — PROGRESS NOTES
Problem: Falls - Risk of  Goal: *Absence of Falls  Description: Document Juncos Fall Risk and appropriate interventions in the flowsheet.   Outcome: Progressing Towards Goal  Note: Fall Risk Interventions:  Mobility Interventions: Assess mobility with egress test, Bed/chair exit alarm, Communicate number of staff needed for ambulation/transfer, OT consult for ADLs, Patient to call before getting OOB, PT Consult for mobility concerns, PT Consult for assist device competence, Strengthening exercises (ROM-active/passive), Utilize walker, cane, or other assistive device    Mentation Interventions: Adequate sleep, hydration, pain control, Bed/chair exit alarm, Door open when patient unattended, Evaluate medications/consider consulting pharmacy, Eyeglasses and hearing aids, Familiar objects from home, Update white board, Toileting rounds, Room close to nurse's station, Reorient patient, More frequent rounding, Increase mobility, HELP ([address]) if available, Gait belt with transfers/ambulation    Medication Interventions: Bed/chair exit alarm, Evaluate medications/consider consulting pharmacy, Patient to call before getting OOB, Teach patient to arise slowly    Elimination Interventions: Bed/chair exit alarm, Call light in reach, Elevated toilet seat, Patient to call for help with toileting needs, Stay With Me (per policy), Toilet paper/wipes in reach, Toileting schedule/hourly rounds    History of Falls Interventions: Bed/chair exit alarm, Consult care management for discharge planning, Door open when patient unattended, Investigate reason for fall, Evaluate medications/consider consulting pharmacy, Room close to nurse's station, Utilize gait belt for transfer/ambulation, Assess for delayed presentation/identification of injury for 48 hrs (comment for end date)         Problem: Pain  Goal: *Control of Pain  Outcome: Progressing Towards Goal     Problem: Nutrition Deficit  Goal: *Optimize nutritional status  Outcome: Progressing Towards Goal     Problem: General Medical Care Plan  Goal: *Vital signs within specified parameters  Outcome: Progressing Towards Goal     Problem: General Medical Care Plan  Goal: *Labs within defined limits  Outcome: Progressing Towards Goal     Problem: General Medical Care Plan  Goal: *Absence of infection signs and symptoms  Outcome: Progressing Towards Goal     Problem: General Medical Care Plan  Goal: *Optimal pain control at patient's stated goal  Outcome: Progressing Towards Goal     Problem: General Medical Care Plan  Goal: *Fluid volume balance  Outcome: Progressing Towards Goal     Problem: General Medical Care Plan  Goal: *Optimize nutritional status  Outcome: Progressing Towards Goal     Problem: General Wound Care  Goal: *Infected Wound: Prevention of further infection and promotion of healing  Description: Infection control procedures (eg: clean dressings, clean gloves, hand washing, precautions to isolate wound from contamination, sterile instruments used for wound debridement) should be implemented. Outcome: Progressing Towards Goal     Problem: Pressure Injury - Risk of  Goal: *Prevention of pressure injury  Description: Document Bret Scale and appropriate interventions in the flowsheet.   Outcome: Not Progressing Towards Goal  Note: Pressure Injury Interventions:  Sensory Interventions: Assess changes in LOC, Assess need for specialty bed, Avoid rigorous massage over bony prominences, Chair cushion, Check visual cues for pain, Discuss PT/OT consult with provider, Float heels, Keep linens dry and wrinkle-free, Maintain/enhance activity level, Minimize linen layers, Monitor skin under medical devices, Pad between skin to skin, Pressure redistribution bed/mattress (bed type), Sit a 90-degree angle/use footstool if needed, Use 30-degree side-lying position    Moisture Interventions: Absorbent underpads, Apply protective barrier, creams and emollients, Assess need for specialty bed, Check for incontinence Q2 hours and as needed, Contain wound drainage, Internal/External urinary devices, Maintain skin hydration (lotion/cream), Minimize layers, Moisture barrier, Offer toileting Q_hr    Activity Interventions: Assess need for specialty bed, Chair cushion, Increase time out of bed, Pressure redistribution bed/mattress(bed type), PT/OT evaluation    Mobility Interventions: Assess need for specialty bed, Chair cushion, Pressure redistribution bed/mattress (bed type), HOB 30 degrees or less, PT/OT evaluation, Float heels, Turn and reposition approx.  every two hours(pillow and wedges)    Nutrition Interventions: Document food/fluid/supplement intake, Discuss nutritional consult with provider, Offer support with meals,snacks and hydration    Friction and Shear Interventions: Apply protective barrier, creams and emollients, Feet elevated on foot rest, Foam dressings/transparent film/skin sealants, HOB 30 degrees or less, Lift sheet, Lift team/patient mobility team, Minimize layers, Sit at 90-degree angle, Transfer aides:transfer board/Jeyson lift/ceiling lift

## 2022-08-07 NOTE — PROGRESS NOTES
General surgery update    Order placed for santyl to be put in bed of wound twice a day with current dressing changes.  Will reassess wound in am.    Mars Lyons

## 2022-08-08 LAB
ALBUMIN SERPL-MCNC: 2.6 G/DL (ref 3.4–5)
ALBUMIN/GLOB SERPL: 0.6 {RATIO} (ref 0.8–1.7)
ALP SERPL-CCNC: 109 U/L (ref 45–117)
ALT SERPL-CCNC: 44 U/L (ref 13–56)
ANION GAP SERPL CALC-SCNC: 6 MMOL/L (ref 3–18)
AST SERPL-CCNC: 34 U/L (ref 10–38)
BACTERIA SPEC CULT: ABNORMAL
BASOPHILS # BLD: 0.1 K/UL (ref 0–0.1)
BASOPHILS NFR BLD: 1 % (ref 0–2)
BILIRUB SERPL-MCNC: 0.4 MG/DL (ref 0.2–1)
BUN SERPL-MCNC: 34 MG/DL (ref 7–18)
BUN/CREAT SERPL: 31 (ref 12–20)
CALCIUM SERPL-MCNC: 9.5 MG/DL (ref 8.5–10.1)
CHLORIDE SERPL-SCNC: 105 MMOL/L (ref 100–111)
CO2 SERPL-SCNC: 27 MMOL/L (ref 21–32)
CREAT SERPL-MCNC: 1.11 MG/DL (ref 0.6–1.3)
DIFFERENTIAL METHOD BLD: ABNORMAL
EOSINOPHIL # BLD: 0 K/UL (ref 0–0.4)
EOSINOPHIL NFR BLD: 0 % (ref 0–5)
ERYTHROCYTE [DISTWIDTH] IN BLOOD BY AUTOMATED COUNT: 14 % (ref 11.6–14.5)
GLOBULIN SER CALC-MCNC: 4.5 G/DL (ref 2–4)
GLUCOSE SERPL-MCNC: 137 MG/DL (ref 74–99)
GRAM STN SPEC: ABNORMAL
GRAM STN SPEC: ABNORMAL
HCT VFR BLD AUTO: 40 % (ref 35–45)
HGB BLD-MCNC: 13 G/DL (ref 12–16)
IMM GRANULOCYTES # BLD AUTO: 0.4 K/UL (ref 0–0.04)
IMM GRANULOCYTES NFR BLD AUTO: 3 % (ref 0–0.5)
LYMPHOCYTES # BLD: 5 K/UL (ref 0.9–3.6)
LYMPHOCYTES NFR BLD: 34 % (ref 21–52)
MCH RBC QN AUTO: 29.8 PG (ref 24–34)
MCHC RBC AUTO-ENTMCNC: 32.5 G/DL (ref 31–37)
MCV RBC AUTO: 91.7 FL (ref 78–100)
MONOCYTES # BLD: 0.7 K/UL (ref 0.05–1.2)
MONOCYTES NFR BLD: 5 % (ref 3–10)
NEUTS SEG # BLD: 8.8 K/UL (ref 1.8–8)
NEUTS SEG NFR BLD: 59 % (ref 40–73)
NRBC # BLD: 0.03 K/UL (ref 0–0.01)
NRBC BLD-RTO: 0.2 PER 100 WBC
PLATELET # BLD AUTO: 136 K/UL (ref 135–420)
PMV BLD AUTO: 12.3 FL (ref 9.2–11.8)
POTASSIUM SERPL-SCNC: 5.8 MMOL/L (ref 3.5–5.5)
PROT SERPL-MCNC: 7.1 G/DL (ref 6.4–8.2)
RBC # BLD AUTO: 4.36 M/UL (ref 4.2–5.3)
SERVICE CMNT-IMP: ABNORMAL
SODIUM SERPL-SCNC: 138 MMOL/L (ref 136–145)
WBC # BLD AUTO: 15 K/UL (ref 4.6–13.2)

## 2022-08-08 PROCEDURE — 74011250636 HC RX REV CODE- 250/636: Performed by: EMERGENCY MEDICINE

## 2022-08-08 PROCEDURE — 74011250636 HC RX REV CODE- 250/636: Performed by: INTERNAL MEDICINE

## 2022-08-08 PROCEDURE — 85025 COMPLETE CBC W/AUTO DIFF WBC: CPT

## 2022-08-08 PROCEDURE — 99222 1ST HOSP IP/OBS MODERATE 55: CPT | Performed by: NURSE PRACTITIONER

## 2022-08-08 PROCEDURE — 74011250637 HC RX REV CODE- 250/637: Performed by: INTERNAL MEDICINE

## 2022-08-08 PROCEDURE — 99232 SBSQ HOSP IP/OBS MODERATE 35: CPT | Performed by: HOSPITALIST

## 2022-08-08 PROCEDURE — 2709999900 HC NON-CHARGEABLE SUPPLY

## 2022-08-08 PROCEDURE — 74011000250 HC RX REV CODE- 250: Performed by: INTERNAL MEDICINE

## 2022-08-08 PROCEDURE — 97535 SELF CARE MNGMENT TRAINING: CPT

## 2022-08-08 PROCEDURE — 65270000046 HC RM TELEMETRY

## 2022-08-08 PROCEDURE — 80053 COMPREHEN METABOLIC PANEL: CPT

## 2022-08-08 PROCEDURE — 97530 THERAPEUTIC ACTIVITIES: CPT

## 2022-08-08 PROCEDURE — 36415 COLL VENOUS BLD VENIPUNCTURE: CPT

## 2022-08-08 PROCEDURE — 74011000258 HC RX REV CODE- 258: Performed by: EMERGENCY MEDICINE

## 2022-08-08 PROCEDURE — 77030038269 HC DRN EXT URIN PURWCK BARD -A

## 2022-08-08 PROCEDURE — 74011000250 HC RX REV CODE- 250: Performed by: SURGERY

## 2022-08-08 RX ADMIN — Medication 2500 MCG: at 08:37

## 2022-08-08 RX ADMIN — PIPERACILLIN AND TAZOBACTAM 3.38 G: 3; .375 INJECTION, POWDER, FOR SOLUTION INTRAVENOUS at 00:33

## 2022-08-08 RX ADMIN — AMLODIPINE BESYLATE 10 MG: 10 TABLET ORAL at 08:37

## 2022-08-08 RX ADMIN — GABAPENTIN 300 MG: 300 CAPSULE ORAL at 08:37

## 2022-08-08 RX ADMIN — SODIUM CHLORIDE, PRESERVATIVE FREE 10 ML: 5 INJECTION INTRAVENOUS at 06:02

## 2022-08-08 RX ADMIN — PIPERACILLIN AND TAZOBACTAM 3.38 G: 3; .375 INJECTION, POWDER, FOR SOLUTION INTRAVENOUS at 08:36

## 2022-08-08 RX ADMIN — COLLAGENASE SANTYL: 250 OINTMENT TOPICAL at 17:24

## 2022-08-08 RX ADMIN — DAKIN'S SOLUTION 0.125% (QUARTER STRENGTH): 0.12 SOLUTION at 17:24

## 2022-08-08 RX ADMIN — SODIUM CHLORIDE, PRESERVATIVE FREE 10 ML: 5 INJECTION INTRAVENOUS at 21:52

## 2022-08-08 RX ADMIN — DEXAMETHASONE 2 MG: 2 TABLET ORAL at 21:44

## 2022-08-08 RX ADMIN — OXYCODONE HYDROCHLORIDE AND ACETAMINOPHEN 1 TABLET: 5; 325 TABLET ORAL at 17:23

## 2022-08-08 RX ADMIN — PIPERACILLIN AND TAZOBACTAM 3.38 G: 3; .375 INJECTION, POWDER, FOR SOLUTION INTRAVENOUS at 17:24

## 2022-08-08 RX ADMIN — COLLAGENASE SANTYL: 250 OINTMENT TOPICAL at 08:38

## 2022-08-08 RX ADMIN — HYDRALAZINE HYDROCHLORIDE 25 MG: 50 TABLET, FILM COATED ORAL at 17:23

## 2022-08-08 RX ADMIN — PYRIDOXINE HCL TAB 50 MG 200 MG: 50 TAB at 08:36

## 2022-08-08 RX ADMIN — WATER 1 G: 1 INJECTION INTRAMUSCULAR; INTRAVENOUS; SUBCUTANEOUS at 21:43

## 2022-08-08 RX ADMIN — GABAPENTIN 300 MG: 300 CAPSULE ORAL at 17:23

## 2022-08-08 RX ADMIN — SODIUM CHLORIDE, PRESERVATIVE FREE 10 ML: 5 INJECTION INTRAVENOUS at 17:24

## 2022-08-08 RX ADMIN — LEVOTHYROXINE SODIUM 88 MCG: 88 TABLET ORAL at 06:01

## 2022-08-08 RX ADMIN — ATENOLOL 100 MG: 50 TABLET ORAL at 09:00

## 2022-08-08 RX ADMIN — HYDRALAZINE HYDROCHLORIDE 25 MG: 50 TABLET, FILM COATED ORAL at 08:36

## 2022-08-08 RX ADMIN — DEXAMETHASONE 2 MG: 2 TABLET ORAL at 08:37

## 2022-08-08 RX ADMIN — DAKIN'S SOLUTION 0.125% (QUARTER STRENGTH): 0.12 SOLUTION at 08:38

## 2022-08-08 RX ADMIN — HYDRALAZINE HYDROCHLORIDE 25 MG: 50 TABLET, FILM COATED ORAL at 21:49

## 2022-08-08 RX ADMIN — DIVALPROEX SODIUM 500 MG: 500 TABLET, EXTENDED RELEASE ORAL at 08:36

## 2022-08-08 NOTE — PROGRESS NOTES
Infectious Disease progress Note        Reason: Gram-negative bloodstream infection    Current abx Prior abx   Zosyn since 8/1/2022  levofloxacin, vancomycin 8/1-8/3     Lines:       Assessment :     74 y.o. female with PMHx significant for htn, seizures, breast cancer, recently diagnosed glioblastoma with vasogenic edema (7/2022), recent hospitalization at [facility] who presented to [facility] ER on 8/1/22 with complaint of Left Foot Blister and Left Ankle Pain. [facility] 7/1-7/4 for fall, diagnosed to have glioblastoma multiforme with mass effect s/p decadron    Hospitalization at [address] [facility] 7/14/22-7/22/22 for fall, AMS (declined hospice during that admission)    Clinical presentation concerning for sepsis-present on admission due to Bacteroides bloodstream infection. Of intera-abdominal source- No evidence of GI infection noted clinically , but CT a/p showed   4.8 x 3.1 cm fluid collection in the right lateral lumbar region-  Lumbar soft tissue abscess as a source of Bacteroides bloodstream infection-  ->S/p bedside I&D. 8/5. grossly necrotic wounds with juanjose pus to bone- cx positive for polyorganism- Enterobacter cloacae complex, Proteus, and Citrobacter spp as well as Bacteroides. Immunocompromise state due to concurrent steroids can mask the full clinical presentation    Surgery follow up appreciated. Plans for bedside I&D noted    Necrotic lower back ulcers, pressure necrosis right heel-likely due to prolonged period of unresponsiveness/pressure injury July 2022    Left foot blister, left leg ulcer-no clinical evidence of infection noted on today's exam.  Wound culture 8/1/2022-Klebsiella, proteus, e.coli, MSSA-likely colonizer    Glioblastoma multiforme-plans for outpatient neurosurgery noted. Currently on Decadron    Recommendations:    Switch abx to Meropenem, given R for Piptazo demonstrated by Enterobacter. Cx still in progress.  Given extent of cx to the the bone- she will need a long course of IV abx- 6 weeks.     Follow up repeat blood culture for clearance   Continue local wound care left leg ulcer, pressure offloading right heel, wound care back ulcers       Poor chances of wound healing looking at the extent of infection, need for steroids. Follow up  wound cx & modify abx accordingly. F/u palliative care consult to discuss goals of care. Above plan was discussed in details with patient. Please call me if any further questions or concerns. Will continue to participate in the care of this patient. HPI:    No fevers, or ever before. No n/v/d. NO abd pain reported;     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    Cancer ([facility] [address] 01/01/2014    breast    Ductal carcinoma in situ of breast     Functional quadriplegia ([address]     as of 7/2022    Glaucoma     History of seizure disorder     Open back wound 07/2022    Pressure wound for lying on a table for indeterminate amount of time    Pituitary adenoma with extrasellar extension ([facility] [address]     Primary hypertension        Past Surgical History:   Procedure Laterality Date    HX HYSTERECTOMY      partial    HX MASTECTOMY  4/30/2014    RIGHT PARTIAL MASTECTOMY WITH NEEDLE LOCALIZATION MAMMOGRAM SENTINEL LYMPH NODE BIOPSY performed by Jaye Frost MD at [address] [facility] Medication List      Details   amLODIPine (NORVASC) 10 mg tablet Take 10 mg by mouth in the morning. dexAMETHasone (DECADRON) 2 mg tablet Take  by mouth every twelve (12) hours. levothyroxine (SYNTHROID) 88 mcg tablet Take 88 mcg by mouth Daily (before breakfast). divalproex ER (DEPAKOTE ER) 500 mg ER tablet Take 500 mg by mouth. hydrALAZINE (APRESOLINE) 25 mg tablet Take 25 mg by mouth three (3) times daily.       gabapentin (NEURONTIN) 300 mg capsule Take 1 tab PO QHS x1 week then increase to BID thereafter  Qty: 60 Cap, Refills: 1 baclofen (LIORESAL) 10 mg tablet Take 1 Tab by mouth three (3) times daily. Qty: 90 Tab, Refills: 0      tamoxifen (NOLVADEX) 10 mg tablet Take  by mouth daily. oxyCODONE-acetaminophen (PERCOCET) 5-325 mg per tablet 1 or 2 tablets by mouth every 4 hours as needed  Qty: 40 Tab, Refills: 0      !! OTHER Vitamin D Po      nebivoloL (BYSTOLIC) 20 mg tablet Take  by mouth daily. torsemide (DEMADEX) 20 mg tablet Take  by mouth daily. !! OTHER [place] eye vitamin OTC      cyanocobalamin (VITAMIN B12) 2,500 mcg sublingual tablet Take 2,500 mcg by mouth daily. pyridoxine, vitamin B6, 200 mg tablet Take 200 mg by mouth daily. BIOTIN PO Take 1,000 mg by mouth. diazePAM (VALIUM) 10 mg tablet TAKE 1 TAB PO 30 MINS PRIOR TO MRI (MUST HAVE SOMEONE TO DRIVE TO AND FROM THE FACILITY)  Qty: 1 Tab, Refills: 0    Associated Diagnoses: Lumbar radiculitis; Lumbar spondylosis      diclofenac (VOLTAREN) 1 % gel QID to affected areas PRN for pain  Qty: 500 g, Refills: 5       !! - Potential duplicate medications found. Please discuss with provider.           Current Facility-Administered Medications   Medication Dose Route Frequency    collagenase (SANTYL) 250 unit/gram ointment   Topical BID    sodium hypochlorite (QUARTER STRENGTH DAKIN'S) 0.125% irrigation (bottle)   Topical BID    melatonin (rapid dissolve) tablet 5 mg  5 mg Oral QHS PRN    piperacillin-tazobactam (ZOSYN) 3.375 g in 0.9% sodium chloride (MBP/ADV) 100 mL MBP  3.375 g IntraVENous Q8H    sodium chloride (NS) flush 5-40 mL  5-40 mL IntraVENous Q8H    sodium chloride (NS) flush 5-40 mL  5-40 mL IntraVENous PRN    acetaminophen (TYLENOL) tablet 650 mg  650 mg Oral Q6H PRN    Or    acetaminophen (TYLENOL) suppository 650 mg  650 mg Rectal Q6H PRN    polyethylene glycol (MIRALAX) packet 17 g  17 g Oral DAILY PRN    ondansetron (ZOFRAN ODT) tablet 4 mg  4 mg Oral Q8H PRN    Or    ondansetron (ZOFRAN) injection 4 mg  4 mg IntraVENous Q6H PRN albuterol-ipratropium (DUO-NEB) 2.5 MG-0.5 MG/3 ML  3 mL Nebulization Q6H PRN    nitroglycerin (NITROBID) 2 % ointment 0.5 Inch  0.5 Inch Topical Q6H PRN    naloxone (NARCAN) injection 0.4 mg  0.4 mg IntraVENous EVERY 2 MINUTES AS NEEDED    amLODIPine (NORVASC) tablet 10 mg  10 mg Oral DAILY    cyanocobalamin (VITAMIN B12) sublingual tablet 2,500 mcg  2,500 mcg Oral DAILY    dexAMETHasone (DECADRON) tablet 2 mg  2 mg Oral Q12H    divalproex ER (DEPAKOTE ER) 24 hour tablet 500 mg  500 mg Oral DAILY    gabapentin (NEURONTIN) capsule 300 mg  300 mg Oral BID    hydrALAZINE (APRESOLINE) tablet 25 mg  25 mg Oral TID    levothyroxine (SYNTHROID) tablet 88 mcg  88 mcg Oral 6am    atenoloL (TENORMIN) tablet 100 mg  100 mg Oral DAILY    oxyCODONE-acetaminophen (PERCOCET) 5-325 mg per tablet 1 Tablet  1 Tablet Oral Q4H PRN    pyridoxine (vitamin B6) (VITAMIN B-6) tablet 200 mg  200 mg Oral DAILY       Allergies: Patient has no known allergies.     Family History   Problem Relation Age of Onset    Colon Cancer Mother      Social History     Socioeconomic History    Marital status:      Spouse name: Not on file    Number of children: Not on file    Years of education: Not on file    Highest education level: Not on file   Occupational History    Not on file   Tobacco Use    Smoking status: Never    Smokeless tobacco: Never   Substance and Sexual Activity    Alcohol use: No     Alcohol/week: 0.0 standard drinks    Drug use: No    Sexual activity: Not on file   Other Topics Concern    Not on file   Social History Narrative    Not on file     Social Determinants of Health     Financial Resource Strain: Not on file   Food Insecurity: Not on file   Transportation Needs: Not on file   Physical Activity: Not on file   Stress: Not on file   Social Connections: Not on file   Intimate Partner Violence: Not on file   Housing Stability: Not on file     Social History     Tobacco Use   Smoking Status Never   Smokeless Tobacco Never Temp (24hrs), Av.3 °F (36.8 °C), Min:97.9 °F (36.6 °C), Max:98.7 °F (37.1 °C)    Visit Vitals  /71   Pulse 64   Temp 97.9 °F (36.6 °C)   Resp 18   Ht 5' 3\" (1.6 m)   Wt 81.7 kg (180 lb 3.2 oz)   SpO2 99%   BMI 31.92 kg/m²       ROS: 12 point ROS obtained in details. Pertinent positives as mentioned in HPI,   otherwise negative    Physical Exam:    General:  female patient laying on the bed AAOx3 in no acute distress. General:   awake alert and oriented   HEENT:  Normocephalic, atraumatic, right eye cataract?,  no scleral icterus or pallor; no conjunctival hemmohage;  nasal and oral mucous are moist and without evidence of lesions. Dentition good. Neck supple, no bruits. Lymph Nodes:   Not examined   Lungs:   non-labored, bilateral chest movements equal, no audible wheezing   Heart:  RRR, s1 and s2; no  rubs or gallops, no edema   Abdomen:  soft, non-distended,  no hepatomegaly, no splenomegaly. Non-tender   Genitourinary:  No galvez   Extremities:   no clubbing, cyanosis; superficial pressure necrosis right heel, blister on dorsal aspect of left foot-no surrounding erythema. Superficial ulceration medial aspect of right leg with red granulation tissue-no purulent drainage/no surrounding erythema, muscle mass appropriate for age   Neurologic:  No gross focal sensory abnormalities; 5/5 muscle strength to upper and lower extremities. Speech appropriate. Cranial nerves intact                        Skin:  Skin changes bilateral lower extremity as mentioned above.   Linear ulceration lumbar region with dry necrotic tissue-no surrounding erythema, no purulent drainage; dry necrotic ulcer upper back; superficial ulcer right buttock- no drainage   Back:  no spinal or paraspinal muscle tenderness or rigidity, no CVA tenderness; skin changes as mentioned above     Psychiatric:  No suicidal or homicidal ideations, appropriate mood and affect         Labs: Results:   Chemistry Recent Labs     22  1109 *      K 5.8*      CO2 27   BUN 34*   CREA 1.11   CA 9.5   AGAP 6   BUCR 31*      TP 7.1   ALB 2.6*   GLOB 4.5*   AGRAT 0.6*      CBC w/Diff Recent Labs     08/08/22  1109   WBC 15.0*   RBC 4.36   HGB 13.0   HCT 40.0      GRANS 59   LYMPH 34   EOS 0      Microbiology Recent Labs     08/05/22  1823   CULT LIGHT ENTEROBACTER CLOACAE COMPLEX*  LIGHT CITROBACTER YOUNGAE*  SCANT PROTEUS SPECIES*  MODERATE ANAEROBIC GRAM NEGATIVE RODS BETA LACTAMASE POSITIVE*          RADIOLOGY:    All available imaging studies/reports in [facility] for this admission were reviewed    Total time spent >35 minutes. High complexity decision making was performed during the evaluation of this patient at high risk for decompensation     Above mentioned total time spent on reviewing the case/medical record/data/notes/EMR/patient examination/documentation/coordinating care with nurse/consultants, exclusive of procedures with complex decision making performed and > 50% time spent in face to face evaluation. Disclaimer: Sections of this note are dictated utilizing voice recognition software, which may have resulted in some phonetic based errors in grammar and contents. Even though attempts were made to correct all the mistakes, some may have been missed, and remained in the body of the document. If questions arise, please contact our department.     Dr. Lucía Rendon, Infectious Disease Specialist    August 8, 2022 same name as above

## 2022-08-08 NOTE — ACP (ADVANCE CARE PLANNING)
Advance Care Planning     General Advance Care Planning (ACP) Conversation    Palliative Medicine  Visited patient along with Palliative team member LUISA Nieto NP. Patient sitting up in bed eating an orange from her breakfast tray. Alert, oriented X 3-4. Patient is very suspicious of our questions, hesitant to answer despite our introduction and explanation of the Palliative role. She stated, \"I understand what you are saying, but is it true\"? Reassured patient, however, she repeated, \"Is it true\"? 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 Patient presented to the ED 8/1/22 with blister to left foot and left ankle pain. PMH: HTN, Seizures, Anemia, Hx Breast CA. Pt does not have an Advance Directive on file in EMR. States, \"I think I filled one out, but not with [employer]". Reviewed care everywhere and was unable to locate an AMD. Is not agreeable to complete another Advanced Medical Directive today. Patient is , has no children, has four siblings. States she only wants her brother Les Cruz and her sister Bonny Robles to be involved in any medical decisions    CODE STATUS: FULL CODE WITH FULL INTERVENTIONS    Thank you for the Palliative Medicine consult and allowing us to participate in the care of Ms. Isrrael López. Will continue to monitor and provide support.     Deejay Harper RN  Palliative Medicine Inpatient RN  [facility]  Palliative COPE Line: [phone])

## 2022-08-08 NOTE — ROUTINE PROCESS
Bedside shift change report given to Alyssa Pires RN (oncoming nurse) by Aiden Avalos RN (offgoing nurse). Report included the following information SBAR, Kardex, and Recent Results.

## 2022-08-08 NOTE — PROGRESS NOTES
Admit Date: 8/1/2022    Assessment    Bernice Quiroz is a 74 y.o. female admitted with severe sepsis, lower back wound    Patient Active Problem List   Diagnosis Code    History of breast cancer Z85.3    Severe obesity ([facility] [address] E66.01    Severe sepsis (HCC) A41.9, R65.20    Open wound of lower back S31.000A    Wound of left leg S81.802A    Glaucoma H40.9    Primary hypertension I10    Acquired hypothyroidism E03.9    Obesity (BMI 30.0-34. 9) E66.9    Impaired mobility and ADLs Z74.09, Z78.9       Plan  -off loading   -no further sharp debridement at this time, continue with santyl to bed of wound with dakins soaked dressing BID   -general surgery will sign off at this time and can follow as needed    Subjective    Overnight events: No acute events overnight . Patient reports no pain    Objective    Physical Exam:  Visit Vitals  /79   Pulse 77   Temp 98.5 °F (36.9 °C)   Resp 18   Ht 5' 3\" (1.6 m)   Wt 81.7 kg (180 lb 3.2 oz)   SpO2 99%   BMI 31.92 kg/m²       Intake/Output Summary (Last 24 hours) at 8/8/2022 1543  Last data filed at 8/8/2022 1624  Gross per 24 hour   Intake --   Output 1200 ml   Net -1200 ml     Physical Exam  Skin:     General: Skin is warm and dry.       Comments: No periwound erythema, moderate serous drainage- dressing recently changed by nursing , no foul odor, bed of wound with some slough             Galindo Alvarado DO  Phone: [phone]

## 2022-08-08 NOTE — PROGRESS NOTES
Problem: Falls - Risk of  Goal: *Absence of Falls  Description: Document Reanna Led Fall Risk and appropriate interventions in the flowsheet. Outcome: Progressing Towards Goal  Note: Fall Risk Interventions:  Mobility Interventions: Patient to call before getting OOB, Bed/chair exit alarm    Mentation Interventions: Reorient patient, Bed/chair exit alarm    Medication Interventions: Patient to call before getting OOB, Bed/chair exit alarm    Elimination Interventions: Call light in reach, Bed/chair exit alarm, Patient to call for help with toileting needs    History of Falls Interventions: Bed/chair exit alarm         Problem: Patient Education: Go to Patient Education Activity  Goal: Patient/Family Education  Outcome: Progressing Towards Goal     Problem: Pressure Injury - Risk of  Goal: *Prevention of pressure injury  Description: Document Bret Scale and appropriate interventions in the flowsheet.   Outcome: Progressing Towards Goal  Note: Pressure Injury Interventions:  Sensory Interventions: Assess changes in LOC, Keep linens dry and wrinkle-free    Moisture Interventions: Internal/External urinary devices, Limit adult briefs, Moisture barrier, Minimize layers, Absorbent underpads    Activity Interventions: Pressure redistribution bed/mattress(bed type)    Mobility Interventions: PT/OT evaluation, HOB 30 degrees or less    Nutrition Interventions: Document food/fluid/supplement intake    Friction and Shear Interventions: HOB 30 degrees or less                Problem: Patient Education: Go to Patient Education Activity  Goal: Patient/Family Education  Outcome: Progressing Towards Goal     Problem: Patient Education: Go to Patient Education Activity  Goal: Patient/Family Education  Outcome: Progressing Towards Goal     Problem: Patient Education: Go to Patient Education Activity  Goal: Patient/Family Education  Outcome: Progressing Towards Goal     Problem: Nutrition Deficit  Goal: *Optimize nutritional status  Outcome: Progressing Towards Goal     Problem: Pain  Goal: *Control of Pain  Outcome: Progressing Towards Goal     Problem: Patient Education: Go to Patient Education Activity  Goal: Patient/Family Education  Outcome: Progressing Towards Goal     Problem: General Medical Care Plan  Goal: *Vital signs within specified parameters  Outcome: Progressing Towards Goal  Goal: *Labs within defined limits  Outcome: Progressing Towards Goal  Goal: *Absence of infection signs and symptoms  Outcome: Progressing Towards Goal  Goal: *Optimal pain control at patient's stated goal  Outcome: Progressing Towards Goal  Goal: *Skin integrity maintained  Outcome: Progressing Towards Goal  Goal: *Fluid volume balance  Outcome: Progressing Towards Goal  Goal: *Optimize nutritional status  Outcome: Progressing Towards Goal  Goal: *Anxiety reduced or absent  Outcome: Progressing Towards Goal  Goal: *Progressive mobility and function (eg: ADL's)  Outcome: Progressing Towards Goal     Problem: Patient Education: Go to Patient Education Activity  Goal: Patient/Family Education  Outcome: Progressing Towards Goal     Problem: General Wound Care  Goal: *Infected Wound: Prevention of further infection and promotion of healing  Description: Infection control procedures (eg: clean dressings, clean gloves, hand washing, precautions to isolate wound from contamination, sterile instruments used for wound debridement) should be implemented.   Outcome: Progressing Towards Goal  Goal: Interventions  Outcome: Progressing Towards Goal     Problem: Patient Education: Go to Patient Education Activity  Goal: Patient/Family Education  Outcome: Progressing Towards Goal

## 2022-08-08 NOTE — ROUTINE PROCESS
Bedside and Verbal shift change report given to Avi Muller, [facility] (oncoming nurse) by JOSE Sigala RN (offgoing nurse). Report included the following information SBAR, Kardex, MAR, and Recent Results.

## 2022-08-08 NOTE — PROGRESS NOTES
Problem: Falls - Risk of  Goal: *Absence of Falls  Description: Document [facility] Fall Risk and appropriate interventions in the flowsheet. Outcome: Progressing Towards Goal  Note: Fall Risk Interventions:  Mobility Interventions: Patient to call before getting OOB, Bed/chair exit alarm    Mentation Interventions: Reorient patient, Bed/chair exit alarm    Medication Interventions: Patient to call before getting OOB, Bed/chair exit alarm    Elimination Interventions: Call light in reach, Bed/chair exit alarm, Patient to call for help with toileting needs    History of Falls Interventions: Bed/chair exit alarm         Problem: Patient Education: Go to Patient Education Activity  Goal: Patient/Family Education  Outcome: Progressing Towards Goal     Problem: Pressure Injury - Risk of  Goal: *Prevention of pressure injury  Description: Document Bret Scale and appropriate interventions in the flowsheet.   Outcome: Progressing Towards Goal  Note: Pressure Injury Interventions:  Sensory Interventions: Assess changes in LOC, Keep linens dry and wrinkle-free    Moisture Interventions: Internal/External urinary devices, Limit adult briefs, Moisture barrier, Minimize layers, Absorbent underpads    Activity Interventions: Pressure redistribution bed/mattress(bed type)    Mobility Interventions: PT/OT evaluation, HOB 30 degrees or less    Nutrition Interventions: Document food/fluid/supplement intake    Friction and Shear Interventions: HOB 30 degrees or less                Problem: Patient Education: Go to Patient Education Activity  Goal: Patient/Family Education  Outcome: Progressing Towards Goal     Problem: Patient Education: Go to Patient Education Activity  Goal: Patient/Family Education  Outcome: Progressing Towards Goal     Problem: Patient Education: Go to Patient Education Activity  Goal: Patient/Family Education  Outcome: Progressing Towards Goal     Problem: Nutrition Deficit  Goal: *Optimize nutritional status  Outcome: Progressing Towards Goal     Problem: Pain  Goal: *Control of Pain  Outcome: Progressing Towards Goal     Problem: Patient Education: Go to Patient Education Activity  Goal: Patient/Family Education  Outcome: Progressing Towards Goal     Problem: General Medical Care Plan  Goal: *Vital signs within specified parameters  Outcome: Progressing Towards Goal  Goal: *Labs within defined limits  Outcome: Progressing Towards Goal  Goal: *Absence of infection signs and symptoms  Outcome: Progressing Towards Goal  Goal: *Optimal pain control at patient's stated goal  Outcome: Progressing Towards Goal  Goal: *Skin integrity maintained  Outcome: Progressing Towards Goal  Goal: *Fluid volume balance  Outcome: Progressing Towards Goal  Goal: *Optimize nutritional status  Outcome: Progressing Towards Goal  Goal: *Anxiety reduced or absent  Outcome: Progressing Towards Goal  Goal: *Progressive mobility and function (eg: ADL's)  Outcome: Progressing Towards Goal     Problem: Patient Education: Go to Patient Education Activity  Goal: Patient/Family Education  Outcome: Progressing Towards Goal     Problem: General Wound Care  Goal: *Infected Wound: Prevention of further infection and promotion of healing  Description: Infection control procedures (eg: clean dressings, clean gloves, hand washing, precautions to isolate wound from contamination, sterile instruments used for wound debridement) should be implemented.   Outcome: Progressing Towards Goal  Goal: Interventions  Outcome: Progressing Towards Goal     Problem: Patient Education: Go to Patient Education Activity  Goal: Patient/Family Education  Outcome: Progressing Towards Goal

## 2022-08-08 NOTE — PROGRESS NOTES
[facility]  Progress Note    Patient: Page Part Age: 74 y.o. : 1955 MR#: [id number] SSN: [id number]  Date/Time: 2022     Subjective:     Patient seen and evaluated, lying in bed, no acute distress. 26-year-old female presents with complaints of left foot blister and left ankle pain. Patient reports a blister on her left ankle which ruptured and drained serous fluid with a large blister on her left foot currently. Patient also has chronic wounds on her lower back. Patient was recently diagnosed with a brain mass for which she was supposed to undergo a brain mass biopsy for possible glioblastoma however it appears that she has not undergone that procedure yet. 8/3-patient seen and evaluated, lying in bed, no acute distress. Blood cultures positive for Bacteroides, wound culture shows heavy gram-negative rods, sensitivities still pending cultures. ID on board, continue broad-spectrum IV antibiotics. -patient seen and evaluated, sitting up in bed, no acute distress. Blood cultures positive-growing Bacteroides, likely beta-lactamase positive, ID on board for antibiotic therapy management. Culture from wound growing Klebsiella and Proteus mirabilis, continue IV Zosyn. CT abdomen and pelvis shows soft tissue stranding and ill-defined fluid in the right paralumbar region measuring 4.8 x 3.1 cm. ID recommended general surgery consultation, general surgery note reviewed, plan for bedside I&D in a.m.     -patient seen and evaluated, lying in bed, no acute distress. Patient worked with PT and OT today. Awaiting input from general surgery regarding right paralumbar region abscess. -patient seen and evaluated, lying in bed, no acute distress. Family at bedside. Appreciate general surgery input, patient underwent I&D of para lumbar region abscess, noted large abscess with increased purulence.   There was necrotic muscle, skin subcutaneous tissue to bone. May require wound VAC in future however given patient's underlying medical problems this is going to take a long time to heal.  Spoke with patient and her brothers at bedside, they verbalized understanding and are trying to convince patient about possible hospice    8/7 - No new events overnight , continue current Rx Plan     8/8 -no new events overnight, labs reviewed, continue current treatment plan. Assessment/Plan:     Sepsis 2y to Left foot blister, left ankle pain with chronic wounds on her lower back-continue broad-spectrum IV antibiotics, appreciate wound care input. ID consultation. CT abdomen and pelvis soft tissue stranding and ill-defined fluid collection along the right paralumbar region measuring 4.8 x 3.1 cm. General surgery consulted, patient underwent I&D with juanjose pus, necrotic muscle skin and subcutaneous tissue to bone. Bacteremia with wound cultures growing Klebsiella and Proteus mirabilis-continue IV antibiotics, will defer duration to infectious disease. Brain mass-patient currently has a neurosurgical appointment, was supposed to undergo biopsy for possible glioblastoma however it is currently pending. Continue dexamethasone 2 mg twice daily. Per patient's brothers she has been given timeframe of approximately 9 months to live. History of hypertension-resume home medications, monitor blood pressure  History of hypothyroidism-continue Synthroid  History of seizure disorder-continue gabapentin and Depakote  History of breast cancer-continue tamoxifen  DVT prophylaxis-Lovenox  Full code        I had a long discussion with the patient after I met with her brothers yesterday, patient is agreeable to go to skilled nursing facility. Continue current treatment plan, will follow. Palliative care consulted.       Daisy Hernandez MD  08/08/22      Case discussed with:  [x]Patient  []Family  []Nursing  []Case Management  DVT Prophylaxis:  [x]Lovenox  []Hep SQ  []SCDs []Coumadin   []On Heparin gtt    Objective:   VS: Visit Vitals  /79   Pulse 77   Temp 98.5 °F (36.9 °C)   Resp 18   Ht 5' 3\" (1.6 m)   Wt 81.7 kg (180 lb 3.2 oz)   SpO2 99%   BMI 31.92 kg/m²        Tmax/24hrs: Temp (24hrs), Av.4 °F (36.9 °C), Min:97.9 °F (36.6 °C), Max:98.7 °F (37.1 °C)  IOBRIEF  Intake/Output Summary (Last 24 hours) at 2022 1405  Last data filed at 2022 4897  Gross per 24 hour   Intake --   Output 1200 ml   Net -1200 ml         General:  Alert, cooperative, no acute distress, blind in right eye  Pulmonary:  CTA Bilaterally. No Wheezing/Rhonchi/Rales. Cardiovascular: Regular rate and Rhythm. GI:  Soft, Non distended, Non tender. + Bowel sounds. Extremities: Bilateral lower extremity edema with open wounds  Neurologic: Alert and oriented X 3. No acute neuro deficits.   Additional:    Medications:   Current Facility-Administered Medications   Medication Dose Route Frequency    collagenase (SANTYL) 250 unit/gram ointment   Topical BID    sodium hypochlorite (QUARTER STRENGTH DAKIN'S) 0.125% irrigation (bottle)   Topical BID    melatonin (rapid dissolve) tablet 5 mg  5 mg Oral QHS PRN    piperacillin-tazobactam (ZOSYN) 3.375 g in 0.9% sodium chloride (MBP/ADV) 100 mL MBP  3.375 g IntraVENous Q8H    sodium chloride (NS) flush 5-40 mL  5-40 mL IntraVENous Q8H    sodium chloride (NS) flush 5-40 mL  5-40 mL IntraVENous PRN    acetaminophen (TYLENOL) tablet 650 mg  650 mg Oral Q6H PRN    Or    acetaminophen (TYLENOL) suppository 650 mg  650 mg Rectal Q6H PRN    polyethylene glycol (MIRALAX) packet 17 g  17 g Oral DAILY PRN    ondansetron (ZOFRAN ODT) tablet 4 mg  4 mg Oral Q8H PRN    Or    ondansetron (ZOFRAN) injection 4 mg  4 mg IntraVENous Q6H PRN    albuterol-ipratropium (DUO-NEB) 2.5 MG-0.5 MG/3 ML  3 mL Nebulization Q6H PRN    nitroglycerin (NITROBID) 2 % ointment 0.5 Inch  0.5 Inch Topical Q6H PRN    naloxone (NARCAN) injection 0.4 mg  0.4 mg IntraVENous EVERY 2 MINUTES AS NEEDED amLODIPine (NORVASC) tablet 10 mg  10 mg Oral DAILY    cyanocobalamin (VITAMIN B12) sublingual tablet 2,500 mcg  2,500 mcg Oral DAILY    dexAMETHasone (DECADRON) tablet 2 mg  2 mg Oral Q12H    divalproex ER (DEPAKOTE ER) 24 hour tablet 500 mg  500 mg Oral DAILY    gabapentin (NEURONTIN) capsule 300 mg  300 mg Oral BID    hydrALAZINE (APRESOLINE) tablet 25 mg  25 mg Oral TID    levothyroxine (SYNTHROID) tablet 88 mcg  88 mcg Oral 6am    atenoloL (TENORMIN) tablet 100 mg  100 mg Oral DAILY    oxyCODONE-acetaminophen (PERCOCET) 5-325 mg per tablet 1 Tablet  1 Tablet Oral Q4H PRN    pyridoxine (vitamin B6) (VITAMIN B-6) tablet 200 mg  200 mg Oral DAILY       Imaging:   XR Results (most recent):  Results from Hospital Encounter encounter on 08/01/22    XR CHEST PORT    Narrative  EXAM: XR CHEST PORT    CLINICAL INDICATION/HISTORY: 74 years Female. chest pain. Additional History: None    TECHNIQUE: Frontal view of the chest    COMPARISON: Chest radiograph 3/2/2020    FINDINGS:    The cardiac silhouette appears within normal limits. Tortuosity of the thoracic  aorta, unchanged in appearance. Pulmonary vasculature appears within normal  limits. No confluent airspace opacity is appreciated. No definite evidence of  pleural effusion or pneumothorax. No acute osseous abnormality appreciated. Impression  No radiographic evidence of acute cardiopulmonary process. CT Results (most recent):  Results from Hospital Encounter encounter on 08/01/22    CT ABD PELV W CONT    Narrative  CT Abdomen And Pelvis with Intravenous Contrast    INDICATION: Lumbar soft tissue abscess. TECHNIQUE: 5 mm collimation axial images obtained from the diaphragm to the  level of the pubic symphysis following the uneventful administration of  100 cc  of low osmolar, nonionic intravenous contrast.    Dose reduction techniques used:  Automated exposure control, adjustment of the  mAs and/or kVp according to patient size, standardized low-dose protocol, and/or  iterative reconstruction technique. COMPARISON: None. ABDOMEN FINDINGS:    Lung Bases: Bibasilar atelectasis. Small pleural effusions. Liver: Normal attenuation. No evidence for mass. Gallbladder: Cholelithiasis. No biliary ductal dilatation. Pancreas: Normal attenuation without mass or ductal dilatation. Spleen: Normal in size. No evidence of mass. .    Adrenal Glands: No evidence for mass. Kidneys:    Right: Normal enhancement. No cortical mass. No hydronephrosis. Left:  Normal enhancement. Left renal cyst.  No hydronephrosis. Lymph Nodes: Upper abdominal lymph nodes. There is a 1.3 cm lymph node in the  jesse hepatis. Aorta:   Normal in caliber    PELVIS FINDINGS:    Bowel: Small Bowel: Normal in caliber with normal wall thickness. Large Bowel: Normal in caliber with normal wall thickness. Moderate stool  burden. Appendix: Normal appendix. Bladder: Underdistended. The uterus is surgically absent. There is no suspicious adnexal mass. There is no free air. There is a small volume of ascites. There is soft tissue stranding and some ill-defined fluid in the deep  subcutaneous tissues which measures approximately 4.8 x 3.1 cm. This is in the  right lateral lumbar region. Bones: No acute finding. Impression  Soft tissue stranding and ill-defined fluid in the right paralumbar region  measuring 4.8 x 3.1 cm. Small volume of ascites in the abdomen and pelvis. No definite acute bowel abnormality. Moderate stool burden. Additional incidentals as above. MRI Results (most recent):  Results from [place] encounter on 03/31/14    MRI BREAST BX VAC ASSIST RT    Addendum 4/3/2014  4:54 PM  Addendum: Pathology changes from right breast biopsy 12:00 position-  Fibrocystic changes with florid ductal epithelial hyperplasia. The findings are benign and concordant.     Narrative  MR guided left breast biopsy    HISTORY: Left breast cancer, abnormal MRI    COMPARISON: MRI March 2014    FINDINGS:    Informed consent was obtained. The risks of the procedure including but not  limited to bleeding and infection were reviewed with the patient who wished to  proceed. Preliminary pre and post contrast images were performed. 20cc  Magnevist IV was administered. With the patient positioned prone, and following placement in the compression  device, the left breast was imaged, and the lesion in the 12:00 left breast was  targeted. Appropriate coordinates records were obtained. The skin was  cleansed and local anesthesia administered. After making a small incision in  the skin with a # 11 scalpel blade, the trocar and biopsy sheath were advanced  to the appropriate depth. After confirmation of appropriate positioning the 9  gauge 20 mm vacuum assisted biopsy needle was advanced to the appropriate  depth. Subsequently a series of approximately 12 vacuum assisted samples were  acquired in a radial fashion. Prior to removal of the sheath, using coaxial technique, a Locqus  cylinder shaped clip was placed to oscar area sampled. The patient was removed from the biopsy device, the wound was dressed and post  biopsy instructions were given to the patient. The samples were sent to the  laboratory for analysis. Diagnostic Mammogram left Breast: CC and MLO views were obtained of the left  breast and document the clip at the site of sampling in the 12:00 left breast.    Complications: No complications. Specimen: 12 vacuum assisted samples  Assistant: None. EBL: 5 cc    Impression  :    Status post MRI guided biopsy of an abnormal area of enhancement in the 12:00  left breast.  Area sampled marked with a     clip. Samples sent to the  laboratory for analysis.         Labs:    Recent Results (from the past 48 hour(s))   CBC WITH AUTOMATED DIFF    Collection Time: 08/08/22 11:09 AM   Result Value Ref Range    WBC 15.0 (H) 4.6 - 13.2 K/uL RBC 4.36 4.20 - 5.30 M/uL    HGB 13.0 12.0 - 16.0 g/dL    HCT 40.0 35.0 - 45.0 %    MCV 91.7 78.0 - 100.0 FL    MCH 29.8 24.0 - 34.0 PG    MCHC 32.5 31.0 - 37.0 g/dL    RDW 14.0 11.6 - 14.5 %    PLATELET 813 342 - 688 K/uL    MPV 12.3 (H) 9.2 - 11.8 FL    NRBC 0.2 (H) 0  WBC    ABSOLUTE NRBC 0.03 (H) 0.00 - 0.01 K/uL    NEUTROPHILS 59 40 - 73 %    LYMPHOCYTES 34 21 - 52 %    MONOCYTES 5 3 - 10 %    EOSINOPHILS 0 0 - 5 %    BASOPHILS 1 0 - 2 %    IMMATURE GRANULOCYTES 3 (H) 0.0 - 0.5 %    ABS. NEUTROPHILS 8.8 (H) 1.8 - 8.0 K/UL    ABS. LYMPHOCYTES 5.0 (H) 0.9 - 3.6 K/UL    ABS. MONOCYTES 0.7 0.05 - 1.2 K/UL    ABS. EOSINOPHILS 0.0 0.0 - 0.4 K/UL    ABS. BASOPHILS 0.1 0.0 - 0.1 K/UL    ABS. IMM. GRANS. 0.4 (H) 0.00 - 0.04 K/UL    DF AUTOMATED     METABOLIC PANEL, COMPREHENSIVE    Collection Time: 08/08/22 11:09 AM   Result Value Ref Range    Sodium 138 136 - 145 mmol/L    Potassium 5.8 (H) 3.5 - 5.5 mmol/L    Chloride 105 100 - 111 mmol/L    CO2 27 21 - 32 mmol/L    Anion gap 6 3.0 - 18 mmol/L    Glucose 137 (H) 74 - 99 mg/dL    BUN 34 (H) 7.0 - 18 MG/DL    Creatinine 1.11 0.6 - 1.3 MG/DL    BUN/Creatinine ratio 31 (H) 12 - 20      GFR est AA 59 (L) >60 ml/min/1.73m2    GFR est non-AA 49 (L) >60 ml/min/1.73m2    Calcium 9.5 8.5 - 10.1 MG/DL    Bilirubin, total 0.4 0.2 - 1.0 MG/DL    ALT (SGPT) 44 13 - 56 U/L    AST (SGOT) 34 10 - 38 U/L    Alk. phosphatase 109 45 - 117 U/L    Protein, total 7.1 6.4 - 8.2 g/dL    Albumin 2.6 (L) 3.4 - 5.0 g/dL    Globulin 4.5 (H) 2.0 - 4.0 g/dL    A-G Ratio 0.6 (L) 0.8 - 1.7         Signed By: Sury Dodd MD     August 8, 2022      I spent 25 minutes with the patient in face-to-face consultation, of which greater than 50% was spent in counseling and coordination of care as described above    Disclaimer: Sections of this note are dictated using utilizing voice recognition software. Minor typographical errors may be present.  If questions arise, please do not hesitate to contact me or call our department.

## 2022-08-08 NOTE — PROGRESS NOTES
Problem: Self Care Deficits Care Plan (Adult)  Goal: *Acute Goals and Plan of Care (Insert Text)  Description: Occupational Therapy Goals  Initiated 8/2/2022 within 7 day(s). 1.  Patient will perform bed mobility in preparation for self-care with minimal assistance/contact guard assist x 1, bed simulated to home environment . 2.  Patient will perform upper body dressing with supervision/set-up. 3.  Patient will perform functional activity sitting EOB with modified independence > 5 minutes, G balance. 4.  Patient will perform toilet transfers with minimal assistance/contact guard assist.  5.  Patient will perform all aspects of toileting with minimal assistance/contact guard assist.  6.  Patient will participate in upper extremity therapeutic exercise/activities with supervision/set-up for 8 minutes. 7.  Patient will utilize energy conservation techniques during functional activities with verbal cues. Prior Level of Function: Patient reports she needed assist with self-care prn from her brothers and used a rollator for functional mobility PTA. Outcome: Progressing Towards Goal   OCCUPATIONAL THERAPY TREATMENT    Patient: Yana Mohan (32 y.o. female)  Date: 8/8/2022  Diagnosis: Sepsis ([address] [A41.9]  Leukocytosis [D72.829]  Open wound of lower back [S31.000A] Severe sepsis [facility])      Precautions: Fall, Skin    Chart, occupational therapy assessment, plan of care, and goals were reviewed. ASSESSMENT:  Pt now w/large fluid filled blister on RLE at ankle requiring Total assist w/LB dressing tasks. Pt requires increase time and 2 person assist w/bed mobility to maneuver to EOB. Pt tolerates ~ 15 minutes at EOB performing UB dressing tasks, donning/doffing hospital gown. Pt exhibiting self limiting behaviors stating \"you do it\". Reinforced importance of increasing activity tolerance for carryover w/ADLs. Pt returned to supine.  Deferred functional transfer training in light on new blister and alerted Noel Roberto and Felicia Delgadillo. Progression toward goals:  []          Improving appropriately and progressing toward goals  [x]          Improving slowly and progressing toward goals  []          Not making progress toward goals and plan of care will be adjusted     PLAN:  Patient continues to benefit from skilled intervention to address the above impairments. Continue treatment per established plan of care. Further Equipment Recommendations for Discharge:  TBD by next level of care    AMPAC: Based on an AM-PAC score of 14/24 and their current ADL deficits; it is recommended that the patient have 3-5 sessions per week of Occupational Therapy at d/c to increase the patient's independence. This AMPAC score should be considered in conjunction with interdisciplinary team recommendations to determine the most appropriate discharge setting. Patient's social support, diagnosis, medical stability, and prior level of function should also be taken into consideration. SUBJECTIVE:   Patient stated You are being too rough.     OBJECTIVE DATA SUMMARY:   Cognitive/Behavioral Status:  Neurologic State: Alert  Orientation Level: Disoriented to time  Cognition: Decreased attention/concentration  Safety/Judgement: Fall prevention, Lack of insight into deficits    Functional Mobility and Transfers for ADLs:   Bed Mobility:  Supine to Sit: Additional time; Moderate assistance;Assist x2  Sit to Supine: Maximum assistance;Assist x2    Balance:  Sitting: Impaired; Without support    ADL Intervention:  Upper Body [address]: Minimum  assistance    Lower Body Dressing Assistance  Socks: Total assistance (dependent)    Neuro Re-Education:  Sitting EOB ~ 15 minutes    Pain:  Pain level pre-treatment: 0/10   Pain level post-treatment: 0/10    Activity Tolerance:    Fair    Please refer to the flowsheet for vital signs taken during this treatment.   After treatment:   []  Patient left in no apparent distress sitting up in chair  [x]  Patient left in no apparent distress in bed  [x]  Call bell left within reach  []  Nursing notified  []  Caregiver present  []  Bed alarm activated    COMMUNICATION/EDUCATION:   [] Role of Occupational Therapy in the acute care setting  [] Home safety education was provided and the patient/caregiver indicated understanding. [] Patient/family have participated as able in working towards goals and plan of care. [x] Patient/family agree to work toward stated goals and plan of care. [] Patient understands intent and goals of therapy, but is neutral about his/her participation. [] Patient is unable to participate in goal setting and plan of care. Thank you for this referral.  KIKI Villarreal  Time Calculation: 35 mins    Ema Martins -PAC® Daily Activity Inpatient Short Form (6-Clicks)*    How much HELP from another person does the patient currently need    (If the patient hasn't done an activity recently, how much help from another person do you think he/she would need if he/she tried?)   Total (Total A or Dep)   A Lot  (Mod to Max A)   A Little (Sup or Min A)   None (Mod I to I)   Putting on and taking off regular lower body clothing? [x] 1 [] 2 [] 3 [] 4   2. Bathing (including washing, rinsing,      drying)? [] 1 [x] 2 [] 3 [] 4   3. Toileting, which includes using toilet, bedpan or urinal?   [] 1 [x] 2 [] 3 [] 4   4. Putting on and taking off regular upper body clothing? [] 1 [] 2 [x] 3 [] 4   5. Taking care of personal grooming such as brushing teeth? [] 1 [] 2 [x] 3 [] 4   6. Eating meals?    [] 1 [] 2 [x] 3 [] 4

## 2022-08-08 NOTE — CONSULTS
[facility]: [phone]  Former[facility]: [phone]     Patient Name: Ras Antonio  YOB: 1955    Date of consult : 8/8/22  Reason for Consult: establish goals of care  Requesting Provider: Pamela Kenney MD    Primary Care Physician: Fran Thomas MD      SUMMARY:   Ras Antonio is a 74 y.o. female with a past history of breast cancer, obesity, primary hypertension, who was admitted on 8/1/2022 from home with a diagnosis of open wound lower back and left leg. Current medical issues leading to Palliative Medicine involvement include: Patient with likely glioblastoma with resulting healthcare related issues. CHIEF COMPLAINT: Left leg pain    HPI/SUBJECTIVE:    Patient is a 24-year-old -American female that lives at home alone. She reports that she has 4 siblings to that she is close to. She was recently diagnosed with a brain mass and was supposed to undergo a brain mass biopsy for possible glioblastoma but has not yet undergone the procedure. Pt's primary oncologist is Dr Princess Briones and her Neurosurgeon is Dr Robert Cox. The patient is:   [x] Verbal and participatory  [] Non-participatory due to:     GOALS OF CARE:  Patient/Health Care Proxy Stated Goals: Prolong life      TREATMENT PREFERENCES:   Code Status: Full Code         PALLIATIVE DIAGNOSES:   Goals of care/ACP  Brain mass  Breast Cancer  Wounds  Debility       PLAN:   Goals of care/ACP  This NP along with America Ibarra RN in to see patient at the bedside. She has an obvious impairment of her right eye and when asked about her vision status she stated \"cannot you tell that I am blind\" I asked if she was totally blind and she stated \"well I can see you\" patient seemed easily irritated by questions and when we introduced ourselves as palliative medicine she looked extremely pensive.   We explained that we are supportive service here and were looking to ensure that she had a voice in her goals of care and that she had the proper documentation for decision makers. She stated \"that all sounds nice but is it true? \"  This was stated by her several times during our conversation. Patient believes she has an AMD on file but our team was not able to locate one. She told us that she would want her brother Wendi Kendrick and her Sister Greg Dunn to be her decision makers. We will attempt to go back tomorrow and get her to sign an advanced medical directive. Patient does appear very suspicious and has difficulty trusting what people come in to tell her. She stated \"people keep coming in here all the time telling me things that are not true\"  Goals of care: Patient is full code with full interventions  2. 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 Patient is apparently scheduled with neurosurgery Dr. Nicole Bliss  3. Breast cancer  Ductal carcinoma of breast is followed by Dr. Telma Rosado seen in his clinic last month. Patient's when asked about her oncologist stated she sees him once a year for a mammogram.  4.  Wounds  Seen by wound care nurse noted large wounds right upper back unstageable pressure with 95% eschar and slough, also on lumbar back, left buttocks, right buttocks, and left pretibial blister with a large left dorsal foot intact serous blister. This would indicate some level of debility and that patient has been sitting for long periods of time for pressure wounds to develop. 5.  Debility  Patient appears to have a palliative performance score of around 50% she has mainly chair to bed existence unable to do any work due to extensive disease progression.   She is going to need a considerable amount of assistance for her functional ADLs and self-care moving forward she has a normal intake it appears of nutrition however she has protein malnutrition with an albumin of 2.6 today and 2.3 on arrival which would indicate longer-term lower level of protein intake. Have concerns for her independence and ability to care for herself at home at this time. Care manager is working on skilled nursing placement. 6.  Initial consult note routed to primary continuity provider  7. Communicated plan of care with: Palliative IDT      Advance Care Planning:  [] [facility] Interdisciplinary Team has updated the ACP Navigator with [address] and Patient Capacity    Primary Decision Maker ([address]): Needs an AMD     Medical Interventions: Full interventions   Other Instructions:         As far as possible, the palliative care team has discussed with patient / health care proxy about goals of care / treatment preferences for patient. HISTORY:     History obtained from: Chart review   Principal Problem:    Severe sepsis ([address] (8/1/2022)    Active Problems:    History of breast cancer (5/16/2016)      Open wound of lower back (8/1/2022)      Wound of left leg (8/1/2022)      Glaucoma (8/1/2022)      Primary hypertension (8/1/2022)      Acquired hypothyroidism (8/1/2022)      Obesity (BMI 30.0-34.9) (8/1/2022)      Overview: BMI 30.70 on 8/01/2022      Impaired mobility and ADLs (8/1/2022)      Overview: Patient is mobile with use of Rolator and has significant debility.     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    Cancer ([address] 01/01/2014    breast    Ductal carcinoma in situ of breast     Functional quadriplegia ([address]     as of 7/2022    Glaucoma     History of seizure disorder     Open back wound 07/2022    Pressure wound for lying on a table for indeterminate amount of time    Pituitary adenoma with extrasellar extension ([address]     Primary hypertension       Past Surgical History:   Procedure Laterality Date    HX HYSTERECTOMY partial    HX MASTECTOMY  4/30/2014    RIGHT PARTIAL MASTECTOMY WITH NEEDLE LOCALIZATION MAMMOGRAM SENTINEL LYMPH NODE BIOPSY performed by Jayna Umaña MD at [facility] MAIN OR      Family History   Problem Relation Age of Onset    Colon Cancer Mother      History reviewed, no pertinent family history.   Social History     Tobacco Use    Smoking status: Never    Smokeless tobacco: Never   Substance Use Topics    Alcohol use: No     Alcohol/week: 0.0 standard drinks     No Known Allergies   Current Facility-Administered Medications   Medication Dose Route Frequency    collagenase (SANTYL) 250 unit/gram ointment   Topical BID    sodium hypochlorite (QUARTER STRENGTH DAKIN'S) 0.125% irrigation (bottle)   Topical BID    melatonin (rapid dissolve) tablet 5 mg  5 mg Oral QHS PRN    piperacillin-tazobactam (ZOSYN) 3.375 g in 0.9% sodium chloride (MBP/ADV) 100 mL MBP  3.375 g IntraVENous Q8H    sodium chloride (NS) flush 5-40 mL  5-40 mL IntraVENous Q8H    sodium chloride (NS) flush 5-40 mL  5-40 mL IntraVENous PRN    acetaminophen (TYLENOL) tablet 650 mg  650 mg Oral Q6H PRN    Or    acetaminophen (TYLENOL) suppository 650 mg  650 mg Rectal Q6H PRN    polyethylene glycol (MIRALAX) packet 17 g  17 g Oral DAILY PRN    ondansetron (ZOFRAN ODT) tablet 4 mg  4 mg Oral Q8H PRN    Or    ondansetron (ZOFRAN) injection 4 mg  4 mg IntraVENous Q6H PRN    albuterol-ipratropium (DUO-NEB) 2.5 MG-0.5 MG/3 ML  3 mL Nebulization Q6H PRN    nitroglycerin (NITROBID) 2 % ointment 0.5 Inch  0.5 Inch Topical Q6H PRN    naloxone (NARCAN) injection 0.4 mg  0.4 mg IntraVENous EVERY 2 MINUTES AS NEEDED    amLODIPine (NORVASC) tablet 10 mg  10 mg Oral DAILY    cyanocobalamin (VITAMIN B12) sublingual tablet 2,500 mcg  2,500 mcg Oral DAILY    dexAMETHasone (DECADRON) tablet 2 mg  2 mg Oral Q12H    divalproex ER (DEPAKOTE ER) 24 hour tablet 500 mg  500 mg Oral DAILY    gabapentin (NEURONTIN) capsule 300 mg  300 mg Oral BID    hydrALAZINE (APRESOLINE) tablet 25 mg  25 mg Oral TID    levothyroxine (SYNTHROID) tablet 88 mcg  88 mcg Oral 6am    atenoloL (TENORMIN) tablet 100 mg  100 mg Oral DAILY    oxyCODONE-acetaminophen (PERCOCET) 5-325 mg per tablet 1 Tablet  1 Tablet Oral Q4H PRN    pyridoxine (vitamin B6) (VITAMIN B-6) tablet 200 mg  200 mg Oral DAILY          Clinical Pain Assessment (nonverbal scale for nonverbal patients): Clinical Pain Assessment  Severity: 3          Duration: for how long has pt been experiencing pain (e.g., 2 days, 1 month, years)  Frequency: how often pain is an issue (e.g., several times per day, once every few days, constant)     FUNCTIONAL ASSESSMENT:     Palliative Performance Scale (PPS):  PPS: 50    ECOG  ECOG Status : Ambulatory, but unable to carry out work activities     PSYCHOSOCIAL/SPIRITUAL SCREENING:      Any spiritual / Orthodoxy concerns:  [] Yes /  [x] No    Caregiver Burnout:  [] Yes /  [] No /  [x] No Caregiver Present      Anticipatory grief assessment:   [x] Normal  / [] Maladaptive        REVIEW OF SYSTEMS:     Systems: constitutional, ears/nose/mouth/throat, respiratory, gastrointestinal, genitourinary, musculoskeletal, integumentary, neurologic, psychiatric, endocrine. Positive findings noted below. Modified ESAS Completed by: provider           Pain: 3           Dyspnea: 0           Stool Occurrence(s): 1   Positive and pertinent negative findings in ROS are noted above in HPI. The following systems were [x] reviewed / [] unable to be reviewed as noted in HPI  Other findings are noted below. 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    ENMT: no nasal discharge, moist mucous membranes  Cardiovascular: regular rhythm, distal pulses intact  Respiratory: breathing not labored, symmetric  Gastrointestinal: soft non-tender, +bowel sounds  Last bowel movement:   Musculoskeletal: no deformity, no tenderness to palpation  Skin: warm, dry  Neurologic: following commands, moving all extremities  Psychiatric: full affect, no hallucinations    Other: Wt Readings from Last 3 Encounters:   08/07/22 81.7 kg (180 lb 3.2 oz)   06/26/19 89.6 kg (197 lb 9.6 oz)   05/22/19 89.5 kg (197 lb 6.4 oz)     Blood pressure 130/71, pulse 64, temperature 97.9 °F (36.6 °C), resp. rate 18, height 5' 3\" (1.6 m), weight 81.7 kg (180 lb 3.2 oz), SpO2 99 %. Pain:  Pain Scale 1: Numeric (0 - 10)  Pain Intensity 1: 0     Pain Location 1: Back  Pain Orientation 1: Left  Pain Description 1: Aching  Pain Intervention(s) 1: Medication (see MAR)       LAB AND IMAGING FINDINGS:     Lab Results   Component Value Date/Time    WBC 15.0 (H) 08/08/2022 11:09 AM    HGB 13.0 08/08/2022 11:09 AM    PLATELET 465 20/15/1320 11:09 AM     Lab Results   Component Value Date/Time    Sodium 138 08/08/2022 11:09 AM    Potassium 5.8 (H) 08/08/2022 11:09 AM    Chloride 105 08/08/2022 11:09 AM    CO2 27 08/08/2022 11:09 AM    BUN 34 (H) 08/08/2022 11:09 AM    Creatinine 1.11 08/08/2022 11:09 AM    Calcium 9.5 08/08/2022 11:09 AM    Magnesium 2.2 08/01/2022 02:14 PM      Lab Results   Component Value Date/Time    Alk.  phosphatase 109 08/08/2022 11:09 AM    Protein, total 7.1 08/08/2022 11:09 AM    Albumin 2.6 (L) 08/08/2022 11:09 AM    Globulin 4.5 (H) 08/08/2022 11:09 AM     Lab Results   Component Value Date/Time    INR 1.1 08/02/2022 08:21 AM    Prothrombin time 14.5 08/02/2022 08:21 AM      No results found for: IRON, FE, TIBC, IBCT, PSAT, FERR   No results found for: PH, PCO2, PO2  No components found for: [place]   Lab Results   Component Value Date/Time     (H) 08/01/2022 02:14 PM              Total time: 50 minutes   Counseling / coordination time, spent as noted above:   > 50% counseling / coordination:  Time spent in direct consultation with the patient, medical team, and family     Prolonged service was provided for  []30 min   []75 min in face to face time in the presence of the patient, spent as noted above. Time Start:   Time End:     Disclaimer: Sections of this note are dictated using utilizing voice recognition software, which may have resulted in some phonetic based errors in grammar and contents. Even though attempts were made to correct all the mistakes, some may have been missed, and remained in the body of the document. If questions arise, please contact our department.

## 2022-08-09 LAB
ALBUMIN SERPL-MCNC: 2.4 G/DL (ref 3.4–5)
ALBUMIN/GLOB SERPL: 0.6 {RATIO} (ref 0.8–1.7)
ALP SERPL-CCNC: 88 U/L (ref 45–117)
ALT SERPL-CCNC: 48 U/L (ref 13–56)
ANION GAP SERPL CALC-SCNC: 5 MMOL/L (ref 3–18)
AST SERPL-CCNC: 38 U/L (ref 10–38)
BACTERIA SPEC CULT: ABNORMAL
BACTERIA SPEC CULT: NORMAL
BASOPHILS # BLD: 0 K/UL (ref 0–0.1)
BASOPHILS NFR BLD: 0 % (ref 0–2)
BILIRUB SERPL-MCNC: 0.7 MG/DL (ref 0.2–1)
BUN SERPL-MCNC: 34 MG/DL (ref 7–18)
BUN/CREAT SERPL: 35 (ref 12–20)
CALCIUM SERPL-MCNC: 9.1 MG/DL (ref 8.5–10.1)
CHLORIDE SERPL-SCNC: 105 MMOL/L (ref 100–111)
CO2 SERPL-SCNC: 29 MMOL/L (ref 21–32)
CREAT SERPL-MCNC: 0.98 MG/DL (ref 0.6–1.3)
DIFFERENTIAL METHOD BLD: ABNORMAL
EOSINOPHIL # BLD: 0 K/UL (ref 0–0.4)
EOSINOPHIL NFR BLD: 0 % (ref 0–5)
ERYTHROCYTE [DISTWIDTH] IN BLOOD BY AUTOMATED COUNT: 14.6 % (ref 11.6–14.5)
GLOBULIN SER CALC-MCNC: 3.8 G/DL (ref 2–4)
GLUCOSE SERPL-MCNC: 136 MG/DL (ref 74–99)
GRAM STN SPEC: ABNORMAL
HCT VFR BLD AUTO: 37.3 % (ref 35–45)
HGB BLD-MCNC: 11.9 G/DL (ref 12–16)
IMM GRANULOCYTES # BLD AUTO: 0.3 K/UL (ref 0–0.04)
IMM GRANULOCYTES NFR BLD AUTO: 3 % (ref 0–0.5)
LYMPHOCYTES # BLD: 2.2 K/UL (ref 0.9–3.6)
LYMPHOCYTES NFR BLD: 23 % (ref 21–52)
MCH RBC QN AUTO: 29.2 PG (ref 24–34)
MCHC RBC AUTO-ENTMCNC: 31.9 G/DL (ref 31–37)
MCV RBC AUTO: 91.4 FL (ref 78–100)
MONOCYTES # BLD: 0.7 K/UL (ref 0.05–1.2)
MONOCYTES NFR BLD: 7 % (ref 3–10)
NEUTS SEG # BLD: 6.5 K/UL (ref 1.8–8)
NEUTS SEG NFR BLD: 67 % (ref 40–73)
NRBC # BLD: 0 K/UL (ref 0–0.01)
NRBC BLD-RTO: 0 PER 100 WBC
PLATELET # BLD AUTO: 169 K/UL (ref 135–420)
PMV BLD AUTO: 10.8 FL (ref 9.2–11.8)
POTASSIUM SERPL-SCNC: 5.6 MMOL/L (ref 3.5–5.5)
PROT SERPL-MCNC: 6.2 G/DL (ref 6.4–8.2)
RBC # BLD AUTO: 4.08 M/UL (ref 4.2–5.3)
SERVICE CMNT-IMP: ABNORMAL
SERVICE CMNT-IMP: NORMAL
SODIUM SERPL-SCNC: 139 MMOL/L (ref 136–145)
WBC # BLD AUTO: 9.6 K/UL (ref 4.6–13.2)

## 2022-08-09 PROCEDURE — 74011000258 HC RX REV CODE- 258: Performed by: INTERNAL MEDICINE

## 2022-08-09 PROCEDURE — 99232 SBSQ HOSP IP/OBS MODERATE 35: CPT | Performed by: HOSPITALIST

## 2022-08-09 PROCEDURE — 99233 SBSQ HOSP IP/OBS HIGH 50: CPT | Performed by: PSYCHIATRY & NEUROLOGY

## 2022-08-09 PROCEDURE — 74011250636 HC RX REV CODE- 250/636: Performed by: INTERNAL MEDICINE

## 2022-08-09 PROCEDURE — 97530 THERAPEUTIC ACTIVITIES: CPT

## 2022-08-09 PROCEDURE — 36415 COLL VENOUS BLD VENIPUNCTURE: CPT

## 2022-08-09 PROCEDURE — 80053 COMPREHEN METABOLIC PANEL: CPT

## 2022-08-09 PROCEDURE — 74011000250 HC RX REV CODE- 250: Performed by: INTERNAL MEDICINE

## 2022-08-09 PROCEDURE — 74011250637 HC RX REV CODE- 250/637: Performed by: INTERNAL MEDICINE

## 2022-08-09 PROCEDURE — 2709999900 HC NON-CHARGEABLE SUPPLY

## 2022-08-09 PROCEDURE — 65270000046 HC RM TELEMETRY

## 2022-08-09 PROCEDURE — 85025 COMPLETE CBC W/AUTO DIFF WBC: CPT

## 2022-08-09 PROCEDURE — 97164 PT RE-EVAL EST PLAN CARE: CPT

## 2022-08-09 RX ADMIN — ATENOLOL 100 MG: 50 TABLET ORAL at 09:24

## 2022-08-09 RX ADMIN — AMLODIPINE BESYLATE 10 MG: 10 TABLET ORAL at 09:25

## 2022-08-09 RX ADMIN — MEROPENEM 1 G: 1 INJECTION INTRAVENOUS at 13:31

## 2022-08-09 RX ADMIN — LEVOTHYROXINE SODIUM 88 MCG: 88 TABLET ORAL at 06:51

## 2022-08-09 RX ADMIN — Medication 2500 MCG: at 09:24

## 2022-08-09 RX ADMIN — GABAPENTIN 300 MG: 300 CAPSULE ORAL at 09:25

## 2022-08-09 RX ADMIN — SODIUM CHLORIDE, PRESERVATIVE FREE 10 ML: 5 INJECTION INTRAVENOUS at 21:44

## 2022-08-09 RX ADMIN — MEROPENEM 1 G: 1 INJECTION INTRAVENOUS at 21:44

## 2022-08-09 RX ADMIN — HYDRALAZINE HYDROCHLORIDE 25 MG: 50 TABLET, FILM COATED ORAL at 21:44

## 2022-08-09 RX ADMIN — DEXAMETHASONE 2 MG: 2 TABLET ORAL at 09:24

## 2022-08-09 RX ADMIN — OXYCODONE HYDROCHLORIDE AND ACETAMINOPHEN 1 TABLET: 5; 325 TABLET ORAL at 09:54

## 2022-08-09 RX ADMIN — HYDRALAZINE HYDROCHLORIDE 25 MG: 50 TABLET, FILM COATED ORAL at 09:24

## 2022-08-09 RX ADMIN — DEXAMETHASONE 2 MG: 2 TABLET ORAL at 21:44

## 2022-08-09 RX ADMIN — HYDRALAZINE HYDROCHLORIDE 25 MG: 50 TABLET, FILM COATED ORAL at 17:14

## 2022-08-09 RX ADMIN — SODIUM CHLORIDE, PRESERVATIVE FREE 10 ML: 5 INJECTION INTRAVENOUS at 15:32

## 2022-08-09 RX ADMIN — COLLAGENASE SANTYL: 250 OINTMENT TOPICAL at 10:43

## 2022-08-09 RX ADMIN — SODIUM CHLORIDE, PRESERVATIVE FREE 10 ML: 5 INJECTION INTRAVENOUS at 07:00

## 2022-08-09 RX ADMIN — DAKIN'S SOLUTION 0.125% (QUARTER STRENGTH): 0.12 SOLUTION at 10:43

## 2022-08-09 RX ADMIN — PYRIDOXINE HCL TAB 50 MG 200 MG: 50 TAB at 09:24

## 2022-08-09 RX ADMIN — GABAPENTIN 300 MG: 300 CAPSULE ORAL at 17:16

## 2022-08-09 RX ADMIN — DIVALPROEX SODIUM 500 MG: 500 TABLET, EXTENDED RELEASE ORAL at 09:24

## 2022-08-09 RX ADMIN — POLYETHYLENE GLYCOL 3350 17 G: 17 POWDER, FOR SOLUTION ORAL at 12:02

## 2022-08-09 NOTE — PROGRESS NOTES
The CM spoke with the patient advising of her upcoming discharge, and inquired had the patient re-thought their choice to opt for home-based home therapy as the recommendation is therapy within a SNF. The patient informed \"No, I have not changed my mind. ..and I still want home health\". The CM asked if the patient's brother could be contacted to update the brother of the patient's choice as the patient also has a wound and that a SNF would be a better option, and the patient stated \"No, I don't want my brother contacted\".     Chica Mendoza, MSW, [place]

## 2022-08-09 NOTE — PROGRESS NOTES
Infectious Disease progress Note        Reason: Gram-negative bloodstream infection    Current abx Prior abx   Zosyn since 8/1/2022  levofloxacin, vancomycin 8/1-8/3     Lines:       Assessment :     74 y.o. female with PMHx significant for htn, seizures, breast cancer, recently diagnosed glioblastoma with vasogenic edema (7/2022), recent hospitalization at [facility] who presented to [facility] ER on 8/1/22 with complaint of Left Foot Blister and Left Ankle Pain. [facility] [place] 7/1-7/4 for fall, diagnosed to have glioblastoma multiforme with mass effect s/p decadron    Hospitalization at [address] [facility] 7/14/22-7/22/22 for fall, AMS (declined hospice during that admission)    Clinical presentation concerning for sepsis-present on admission due to Bacteroides bloodstream infection. Of intera-abdominal source- No evidence of GI infection noted clinically , but CT a/p showed   4.8 x 3.1 cm fluid collection in the right lateral lumbar region-  Lumbar soft tissue abscess as a source of Bacteroides bloodstream infection-  ->S/p bedside I&D. 8/5. grossly necrotic wounds with juanjose pus to bone- cx positive for polyorganism- Enterobacter cloacae complex, Proteus, and Citrobacter spp as well as Bacteroides. Immunocompromise state due to concurrent steroids can mask the full clinical presentation    Surgery follow up appreciated. Plans for bedside I&D noted    Necrotic lower back ulcers, pressure necrosis right heel-likely due to prolonged period of unresponsiveness/pressure injury July 2022    bilateral foot blisters, left leg ulcer-no clinical evidence of infection noted on today's exam.  Wound culture 8/1/2022-Klebsiella, proteus, e.coli, MSSA-likely colonizer    Glioblastoma multiforme-plans for outpatient neurosurgery noted. Currently on Decadron    Recommendations:    Continue meropenem, given R for Piptazo demonstrated by Enterobacter. Cx still in progress. Low risk of increased seizure activity with meropenem.   Monitor   given extent of cx to the the bone- she will need a long course of IV abx- 6 weeks. Follow up repeat blood culture for clearance   Continue local wound care left leg ulcer, pressure offloading right heel, wound care back ulcers       Poor chances of wound healing looking at the extent of infection, and need for steroids. Follow up  wound cx & modify abx accordingly. F/u palliative care and psych consults to discuss goals of care. Above plan was discussed in details with patient. Please call me if any further questions or concerns. Will continue to participate in the care of this patient. HPI:    No fevers, or ever before. No n/v/d. NO abd pain reported;     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    Cancer ([facility] [address] 01/01/2014    breast    Ductal carcinoma in situ of breast     Functional quadriplegia ([facility] [address]     as of 7/2022    Glaucoma     History of seizure disorder     Open back wound 07/2022    Pressure wound for lying on a table for indeterminate amount of time    Pituitary adenoma with extrasellar extension ([address]     Primary hypertension        Past Surgical History:   Procedure Laterality Date    HX HYSTERECTOMY      partial    HX MASTECTOMY  4/30/2014    RIGHT PARTIAL MASTECTOMY WITH NEEDLE LOCALIZATION MAMMOGRAM SENTINEL LYMPH NODE BIOPSY performed by Marnette Dakin, MD at [address] [place] [address] Medication List      Details   amLODIPine (NORVASC) 10 mg tablet Take 10 mg by mouth in the morning. dexAMETHasone (DECADRON) 2 mg tablet Take  by mouth every twelve (12) hours. levothyroxine (SYNTHROID) 88 mcg tablet Take 88 mcg by mouth Daily (before breakfast). divalproex ER (DEPAKOTE ER) 500 mg ER tablet Take 500 mg by mouth. hydrALAZINE (APRESOLINE) 25 mg tablet Take 25 mg by mouth three (3) times daily.       gabapentin (NEURONTIN) 300 mg capsule Take 1 tab PO QHS x1 week then increase to BID thereafter  Qty: 60 Cap, Refills: 1      baclofen (LIORESAL) 10 mg tablet Take 1 Tab by mouth three (3) times daily. Qty: 90 Tab, Refills: 0      tamoxifen (NOLVADEX) 10 mg tablet Take  by mouth daily. oxyCODONE-acetaminophen (PERCOCET) 5-325 mg per tablet 1 or 2 tablets by mouth every 4 hours as needed  Qty: 40 Tab, Refills: 0      !! OTHER Vitamin D Po      nebivoloL (BYSTOLIC) 20 mg tablet Take  by mouth daily. torsemide (DEMADEX) 20 mg tablet Take  by mouth daily. !! OTHER Occuvite eye vitamin OTC      cyanocobalamin (VITAMIN B12) 2,500 mcg sublingual tablet Take 2,500 mcg by mouth daily. pyridoxine, vitamin B6, 200 mg tablet Take 200 mg by mouth daily. BIOTIN PO Take 1,000 mg by mouth. diazePAM (VALIUM) 10 mg tablet TAKE 1 TAB PO 30 MINS PRIOR TO MRI (MUST HAVE SOMEONE TO DRIVE TO AND FROM THE FACILITY)  Qty: 1 Tab, Refills: 0    Associated Diagnoses: Lumbar radiculitis; Lumbar spondylosis      diclofenac (VOLTAREN) 1 % gel QID to affected areas PRN for pain  Qty: 500 g, Refills: 5       !! - Potential duplicate medications found. Please discuss with provider.           Current Facility-Administered Medications   Medication Dose Route Frequency    meropenem (MERREM) 1 g in 0.9% sodium chloride (MBP/ADV) 50 mL MBP  1 g IntraVENous Q8H    collagenase (SANTYL) 250 unit/gram ointment   Topical BID    sodium hypochlorite (QUARTER STRENGTH DAKIN'S) 0.125% irrigation (bottle)   Topical BID    melatonin (rapid dissolve) tablet 5 mg  5 mg Oral QHS PRN    sodium chloride (NS) flush 5-40 mL  5-40 mL IntraVENous Q8H    sodium chloride (NS) flush 5-40 mL  5-40 mL IntraVENous PRN    acetaminophen (TYLENOL) tablet 650 mg  650 mg Oral Q6H PRN    Or    acetaminophen (TYLENOL) suppository 650 mg  650 mg Rectal Q6H PRN    polyethylene glycol (MIRALAX) packet 17 g  17 g Oral DAILY PRN    ondansetron (ZOFRAN ODT) tablet 4 mg  4 mg Oral Q8H PRN Or    ondansetron (ZOFRAN) injection 4 mg  4 mg IntraVENous Q6H PRN    albuterol-ipratropium (DUO-NEB) 2.5 MG-0.5 MG/3 ML  3 mL Nebulization Q6H PRN    nitroglycerin (NITROBID) 2 % ointment 0.5 Inch  0.5 Inch Topical Q6H PRN    naloxone (NARCAN) injection 0.4 mg  0.4 mg IntraVENous EVERY 2 MINUTES AS NEEDED    amLODIPine (NORVASC) tablet 10 mg  10 mg Oral DAILY    cyanocobalamin (VITAMIN B12) sublingual tablet 2,500 mcg  2,500 mcg Oral DAILY    dexAMETHasone (DECADRON) tablet 2 mg  2 mg Oral Q12H    divalproex ER (DEPAKOTE ER) 24 hour tablet 500 mg  500 mg Oral DAILY    gabapentin (NEURONTIN) capsule 300 mg  300 mg Oral BID    hydrALAZINE (APRESOLINE) tablet 25 mg  25 mg Oral TID    levothyroxine (SYNTHROID) tablet 88 mcg  88 mcg Oral 6am    atenoloL (TENORMIN) tablet 100 mg  100 mg Oral DAILY    oxyCODONE-acetaminophen (PERCOCET) 5-325 mg per tablet 1 Tablet  1 Tablet Oral Q4H PRN    pyridoxine (vitamin B6) (VITAMIN B-6) tablet 200 mg  200 mg Oral DAILY       Allergies: Patient has no known allergies.     Family History   Problem Relation Age of Onset    Colon Cancer Mother      Social History     Socioeconomic History    Marital status:      Spouse name: Not on file    Number of children: Not on file    Years of education: Not on file    Highest education level: Not on file   Occupational History    Not on file   Tobacco Use    Smoking status: Never    Smokeless tobacco: Never   Substance and Sexual Activity    Alcohol use: No     Alcohol/week: 0.0 standard drinks    Drug use: No    Sexual activity: Not on file   Other Topics Concern    Not on file   Social History Narrative    Not on file     Social Determinants of Health     Financial Resource Strain: Not on file   Food Insecurity: Not on file   Transportation Needs: Not on file   Physical Activity: Not on file   Stress: Not on file   Social Connections: Not on file   Intimate Partner Violence: Not on file   Housing Stability: Not on file     Social History     Tobacco Use   Smoking Status Never   Smokeless Tobacco Never        Temp (24hrs), Av.1 °F (36.7 °C), Min:97.4 °F (36.3 °C), Max:99.2 °F (37.3 °C)    Visit Vitals  BP (!) 92/54 (BP 1 Location: Right upper arm, BP Patient Position: At rest)   Pulse 79   Temp 98.1 °F (36.7 °C)   Resp 18   Ht 5' 3\" (1.6 m)   Wt 83.1 kg (183 lb 3.2 oz)   SpO2 95%   BMI 32.45 kg/m²       ROS: 12 point ROS obtained in details. Pertinent positives as mentioned in HPI,   otherwise negative    Physical Exam:    General:  female patient laying on the bed AAOx3 in no acute distress. General:   awake alert and oriented   HEENT:  Normocephalic, atraumatic, right eye cataract?,  no scleral icterus or pallor; no conjunctival hemmohage;  nasal and oral mucous are moist and without evidence of lesions. Dentition good. Neck supple, no bruits. Lymph Nodes:   Not examined   Lungs:   non-labored, bilateral chest movements equal, no audible wheezing   Heart:  RRR, s1 and s2; no  rubs or gallops, no edema   Abdomen:  soft, non-distended,  no hepatomegaly, no splenomegaly. Non-tender   Genitourinary:  No galvez   Extremities:   no clubbing, cyanosis; superficial pressure necrosis right heel, blister on dorsal aspect of left foot-no surrounding erythema. Superficial ulceration medial aspect of right leg with red granulation tissue-no purulent drainage/no surrounding erythema, muscle mass appropriate for age   Neurologic:  No gross focal sensory abnormalities; 5/5 muscle strength to upper and lower extremities. Speech appropriate. Cranial nerves intact                        Skin:  Skin changes bilateral lower extremity as mentioned above.   Linear ulceration lumbar region with dry necrotic tissue-no surrounding erythema, no purulent drainage; dry necrotic ulcer upper back; superficial ulcer right buttock- no drainage   Back: Not examined   Psychiatric:  No suicidal or homicidal ideations, appropriate mood and affect         Labs: Results:   Chemistry Recent Labs     08/09/22  0328 08/08/22  1109   * 137*    138   K 5.6* 5.8*    105   CO2 29 27   BUN 34* 34*   CREA 0.98 1.11   CA 9.1 9.5   AGAP 5 6   BUCR 35* 31*   AP 88 109   TP 6.2* 7.1   ALB 2.4* 2.6*   GLOB 3.8 4.5*   AGRAT 0.6* 0.6*      CBC w/Diff Recent Labs     08/09/22  0620 08/08/22  1109   WBC 9.6 15.0*   RBC 4.08* 4.36   HGB 11.9* 13.0   HCT 37.3 40.0    136   GRANS 67 59   LYMPH 23 34   EOS 0 0      Microbiology No results for input(s): CULT in the last 72 hours. RADIOLOGY:    All available imaging studies/reports in [facility] for this admission were reviewed    Total time spent >35 minutes. High complexity decision making was performed during the evaluation of this patient at high risk for decompensation     Above mentioned total time spent on reviewing the case/medical record/data/notes/EMR/patient examination/documentation/coordinating care with nurse/consultants, exclusive of procedures with complex decision making performed and > 50% time spent in face to face evaluation. Disclaimer: Sections of this note are dictated utilizing voice recognition software, which may have resulted in some phonetic based errors in grammar and contents. Even though attempts were made to correct all the mistakes, some may have been missed, and remained in the body of the document. If questions arise, please contact our department.     Dr. Robert Greenwood, Infectious Disease Specialist    August 9, 2022

## 2022-08-09 NOTE — PROGRESS NOTES
[facility]  [facility]    Patient: Oh Villarreal Age: 74 y.o. : 1955 MR#: [id number] SSN: [id number]  Date/Time: 2022     Subjective:     Patient seen and evaluated, lying in bed, no acute distress. 25-year-old female presents with complaints of left foot blister and left ankle pain. Patient reports a blister on her left ankle which ruptured and drained serous fluid with a large blister on her left foot currently. Patient also has chronic wounds on her lower back. Patient was recently diagnosed with a brain mass for which she was supposed to undergo a brain mass biopsy for possible glioblastoma however it appears that she has not undergone that procedure yet. 8/3-patient seen and evaluated, lying in bed, no acute distress. Blood cultures positive for Bacteroides, wound culture shows heavy gram-negative rods, sensitivities still pending cultures. ID on board, continue broad-spectrum IV antibiotics. -patient seen and evaluated, sitting up in bed, no acute distress. Blood cultures positive-growing Bacteroides, likely beta-lactamase positive, ID on board for antibiotic therapy management. Culture from wound growing Klebsiella and Proteus mirabilis, continue IV Zosyn. CT abdomen and pelvis shows soft tissue stranding and ill-defined fluid in the right paralumbar region measuring 4.8 x 3.1 cm. ID recommended general surgery consultation, general surgery note reviewed, plan for bedside I&D in a.m.     -patient seen and evaluated, lying in bed, no acute distress. Patient worked with PT and OT today. Awaiting input from general surgery regarding right paralumbar region abscess. -patient seen and evaluated, lying in bed, no acute distress. Family at bedside. Appreciate general surgery input, patient underwent I&D of para lumbar region abscess, noted large abscess with increased purulence.   There was necrotic muscle, skin subcutaneous tissue to bone. May require wound VAC in future however given patient's underlying medical problems this is going to take a long time to heal.  Spoke with patient and her brothers at bedside, they verbalized understanding and are trying to convince patient about possible hospice    8/7 - No new events overnight , continue current Rx Plan     8/8 -no new events overnight, labs reviewed, continue current treatment plan. 8/9-continue IV antibiotics, ID changed to meropenem. Psych consulted to see if patient is capable of medical decision-making. I spoke with patient regarding need for skilled nursing facility on discharge she verbalized understanding. Assessment/Plan:     Sepsis 2y to Left foot blister, left ankle pain with chronic wounds on her lower back-continue broad-spectrum IV antibiotics, appreciate wound care input. ID consultation. CT abdomen and pelvis soft tissue stranding and ill-defined fluid collection along the right paralumbar region measuring 4.8 x 3.1 cm. General surgery consulted, patient underwent I&D with juanjose pus, necrotic muscle skin and subcutaneous tissue to bone. Bacteremia with wound cultures growing Klebsiella and Proteus mirabilis-continue IV antibiotics, will defer duration to infectious disease. Brain mass-patient currently has a neurosurgical appointment, was supposed to undergo biopsy for possible glioblastoma however it is currently pending. Continue dexamethasone 2 mg twice daily. Per patient's brothers she has been given timeframe of approximately 9 months to live. History of hypertension-resume home medications, monitor blood pressure  History of hypothyroidism-continue Synthroid  History of seizure disorder-continue gabapentin and Depakote  History of breast cancer-continue tamoxifen  DVT prophylaxis-Lovenox  Full code        I had a long discussion with the patient after I met with her brothers yesterday, patient is agreeable to go to skilled nursing facility. Continue current treatment plan, will follow. Palliative care consulted. Mireya Gotti MD  22      Case discussed with:  [x]Patient  []Family  []Nursing  []Case Management  DVT Prophylaxis:  [x]Lovenox  []Hep SQ  []SCDs  []Coumadin   []On Heparin gtt    Objective:   VS: Visit Vitals  BP (!) 92/54 (BP 1 Location: Right upper arm, BP Patient Position: At rest)   Pulse 79   Temp 98.1 °F (36.7 °C)   Resp 18   Ht 5' 3\" (1.6 m)   Wt 83.1 kg (183 lb 3.2 oz)   SpO2 95%   BMI 32.45 kg/m²        Tmax/24hrs: Temp (24hrs), Av.1 °F (36.7 °C), Min:97.4 °F (36.3 °C), Max:99.2 °F (37.3 °C)  IOBRIEF  Intake/Output Summary (Last 24 hours) at 2022 1252  Last data filed at 2022 0135  Gross per 24 hour   Intake 360 ml   Output 600 ml   Net -240 ml         General:  Alert, cooperative, no acute distress, blind in right eye  Pulmonary:  CTA Bilaterally. No Wheezing/Rhonchi/Rales. Cardiovascular: Regular rate and Rhythm. GI:  Soft, Non distended, Non tender. + Bowel sounds. Extremities: Bilateral lower extremity edema with open wounds  Neurologic: Alert and oriented X 3. No acute neuro deficits.   Additional:    Medications:   Current Facility-Administered Medications   Medication Dose Route Frequency    meropenem (MERREM) 1 g in 0.9% sodium chloride (MBP/ADV) 50 mL MBP  1 g IntraVENous Q8H    collagenase (SANTYL) 250 unit/gram ointment   Topical BID    sodium hypochlorite (QUARTER [employer]EN[employer] DAKIN'S) 0.125% irrigation (bottle)   Topical BID    melatonin (rapid dissolve) tablet 5 mg  5 mg Oral QHS PRN    sodium chloride (NS) flush 5-40 mL  5-40 mL IntraVENous Q8H    sodium chloride (NS) flush 5-40 mL  5-40 mL IntraVENous PRN    acetaminophen (TYLENOL) tablet 650 mg  650 mg Oral Q6H PRN    Or    acetaminophen (TYLENOL) suppository 650 mg  650 mg Rectal Q6H PRN    polyethylene glycol (MIRALAX) packet 17 g  17 g Oral DAILY PRN    ondansetron (ZOFRAN ODT) tablet 4 mg  4 mg Oral Q8H PRN    Or    ondansetron [facility]) injection 4 mg  4 mg IntraVENous Q6H PRN    albuterol-ipratropium (DUO-NEB) 2.5 MG-0.5 MG/3 ML  3 mL Nebulization Q6H PRN    nitroglycerin (NITROBID) 2 % ointment 0.5 Inch  0.5 Inch Topical Q6H PRN    naloxone (NARCAN) injection 0.4 mg  0.4 mg IntraVENous EVERY 2 MINUTES AS NEEDED    amLODIPine (NORVASC) tablet 10 mg  10 mg Oral DAILY    cyanocobalamin (VITAMIN B12) sublingual tablet 2,500 mcg  2,500 mcg Oral DAILY    dexAMETHasone (DECADRON) tablet 2 mg  2 mg Oral Q12H    divalproex ER (DEPAKOTE ER) 24 hour tablet 500 mg  500 mg Oral DAILY    gabapentin (NEURONTIN) capsule 300 mg  300 mg Oral BID    hydrALAZINE (APRESOLINE) tablet 25 mg  25 mg Oral TID    levothyroxine (SYNTHROID) tablet 88 mcg  88 mcg Oral 6am    atenoloL (TENORMIN) tablet 100 mg  100 mg Oral DAILY    oxyCODONE-acetaminophen (PERCOCET) 5-325 mg per tablet 1 Tablet  1 Tablet Oral Q4H PRN    pyridoxine (vitamin B6) (VITAMIN B-6) tablet 200 mg  200 mg Oral DAILY       Imaging:   XR Results (most recent):  Results from Hospital Encounter encounter on 08/01/22    XR CHEST PORT    Narrative  EXAM: XR CHEST PORT    CLINICAL INDICATION/HISTORY: 74 years Female. chest pain. Additional History: None    TECHNIQUE: Frontal view of the chest    COMPARISON: Chest radiograph 3/2/2020    FINDINGS:    The cardiac silhouette appears within normal limits. Tortuosity of the thoracic  aorta, unchanged in appearance. Pulmonary vasculature appears within normal  limits. No confluent airspace opacity is appreciated. No definite evidence of  pleural effusion or pneumothorax. No acute osseous abnormality appreciated. Impression  No radiographic evidence of acute cardiopulmonary process. CT Results (most recent):  Results from Hospital Encounter encounter on 08/01/22    CT ABD PELV W CONT    Narrative  CT Abdomen And Pelvis with Intravenous Contrast    INDICATION: Lumbar soft tissue abscess.     TECHNIQUE: 5 mm collimation axial images obtained from the diaphragm to the  level of the pubic symphysis following the uneventful administration of  100 cc  of low osmolar, nonionic intravenous contrast.    Dose reduction techniques used: Automated exposure control, adjustment of the  mAs and/or kVp according to patient size, standardized low-dose protocol, and/or  iterative reconstruction technique. COMPARISON: None. ABDOMEN FINDINGS:    Lung Bases: Bibasilar atelectasis. Small pleural effusions. Liver: Normal attenuation. No evidence for mass. Gallbladder: Cholelithiasis. No biliary ductal dilatation. Pancreas: Normal attenuation without mass or ductal dilatation. Spleen: Normal in size. No evidence of mass. .    Adrenal Glands: No evidence for mass. Kidneys:    Right: Normal enhancement. No cortical mass. No hydronephrosis. Left:  Normal enhancement. Left renal cyst.  No hydronephrosis. Lymph Nodes: Upper abdominal lymph nodes. There is a 1.3 cm lymph node in the  jesse hepatis. Aorta:   Normal in caliber    PELVIS FINDINGS:    Bowel: Small Bowel: Normal in caliber with normal wall thickness. Large Bowel: Normal in caliber with normal wall thickness. Moderate stool  burden. Appendix: Normal appendix. Bladder: Underdistended. The uterus is surgically absent. There is no suspicious adnexal mass. There is no free air. There is a small volume of ascites. There is soft tissue stranding and some ill-defined fluid in the deep  subcutaneous tissues which measures approximately 4.8 x 3.1 cm. This is in the  right lateral lumbar region. Bones: No acute finding. Impression  Soft tissue stranding and ill-defined fluid in the right paralumbar region  measuring 4.8 x 3.1 cm. Small volume of ascites in the abdomen and pelvis. No definite acute bowel abnormality. Moderate stool burden. Additional incidentals as above.            MRI Results (most recent):  Results from [place] encounter on 03/31/14    MRI BREAST BX VAC ASSIST RT    Addendum 4/3/2014  4:54 PM  Addendum: Pathology changes from right breast biopsy 12:00 position-  Fibrocystic changes with florid ductal epithelial hyperplasia. The findings are benign and concordant. Narrative  MR guided left breast biopsy    HISTORY: Left breast cancer, abnormal MRI    COMPARISON: MRI March 2014    FINDINGS:    Informed consent was obtained. The risks of the procedure including but not  limited to bleeding and infection were reviewed with the patient who wished to  proceed. Preliminary pre and post contrast images were performed. 20cc  Magnevist IV was administered. With the patient positioned prone, and following placement in the compression  device, the left breast was imaged, and the lesion in the 12:00 left breast was  targeted. Appropriate coordinates records were obtained. The skin was  cleansed and local anesthesia administered. After making a small incision in  the skin with a # 11 scalpel blade, the trocar and biopsy sheath were advanced  to the appropriate depth. After confirmation of appropriate positioning the 9  gauge 20 mm vacuum assisted biopsy needle was advanced to the appropriate  depth. Subsequently a series of approximately 12 vacuum assisted samples were  acquired in a radial fashion. Prior to removal of the sheath, using coaxial technique, a [employer]  cylinder shaped clip was placed to oscar area sampled. The patient was removed from the biopsy device, the wound was dressed and post  biopsy instructions were given to the patient. The samples were sent to the  laboratory for analysis. Diagnostic Mammogram left Breast: CC and MLO views were obtained of the left  breast and document the clip at the site of sampling in the 12:00 left breast.    Complications: No complications. Specimen: 12 vacuum assisted samples  Assistant: None.   EBL: 5 cc    Impression  :    Status post MRI guided biopsy of an abnormal area of enhancement in the 12:00  left breast.  Area sampled marked with a     clip. Samples sent to the  laboratory for analysis. Labs:    Recent Results (from the past 48 hour(s))   CBC WITH AUTOMATED DIFF    Collection Time: 08/08/22 11:09 AM   Result Value Ref Range    WBC 15.0 (H) 4.6 - 13.2 K/uL    RBC 4.36 4.20 - 5.30 M/uL    HGB 13.0 12.0 - 16.0 g/dL    HCT 40.0 35.0 - 45.0 %    MCV 91.7 78.0 - 100.0 FL    MCH 29.8 24.0 - 34.0 PG    MCHC 32.5 31.0 - 37.0 g/dL    RDW 14.0 11.6 - 14.5 %    PLATELET 557 374 - 074 K/uL    MPV 12.3 (H) 9.2 - 11.8 FL    NRBC 0.2 (H) 0  WBC    ABSOLUTE NRBC 0.03 (H) 0.00 - 0.01 K/uL    NEUTROPHILS 59 40 - 73 %    LYMPHOCYTES 34 21 - 52 %    MONOCYTES 5 3 - 10 %    EOSINOPHILS 0 0 - 5 %    BASOPHILS 1 0 - 2 %    IMMATURE GRANULOCYTES 3 (H) 0.0 - 0.5 %    ABS. NEUTROPHILS 8.8 (H) 1.8 - 8.0 K/UL    ABS. LYMPHOCYTES 5.0 (H) 0.9 - 3.6 K/UL    ABS. MONOCYTES 0.7 0.05 - 1.2 K/UL    ABS. EOSINOPHILS 0.0 0.0 - 0.4 K/UL    ABS. BASOPHILS 0.1 0.0 - 0.1 K/UL    ABS. IMM. GRANS. 0.4 (H) 0.00 - 0.04 K/UL    DF AUTOMATED     METABOLIC PANEL, COMPREHENSIVE    Collection Time: 08/08/22 11:09 AM   Result Value Ref Range    Sodium 138 136 - 145 mmol/L    Potassium 5.8 (H) 3.5 - 5.5 mmol/L    Chloride 105 100 - 111 mmol/L    CO2 27 21 - 32 mmol/L    Anion gap 6 3.0 - 18 mmol/L    Glucose 137 (H) 74 - 99 mg/dL    BUN 34 (H) 7.0 - 18 MG/DL    Creatinine 1.11 0.6 - 1.3 MG/DL    BUN/Creatinine ratio 31 (H) 12 - 20      GFR est AA 59 (L) >60 ml/min/1.73m2    GFR est non-AA 49 (L) >60 ml/min/1.73m2    Calcium 9.5 8.5 - 10.1 MG/DL    Bilirubin, total 0.4 0.2 - 1.0 MG/DL    ALT (SGPT) 44 13 - 56 U/L    AST (SGOT) 34 10 - 38 U/L    Alk.  phosphatase 109 45 - 117 U/L    Protein, total 7.1 6.4 - 8.2 g/dL    Albumin 2.6 (L) 3.4 - 5.0 g/dL    Globulin 4.5 (H) 2.0 - 4.0 g/dL    A-G Ratio 0.6 (L) 0.8 - 1.7     METABOLIC PANEL, COMPREHENSIVE    Collection Time: 08/09/22  3:28 AM   Result Value Ref Range    Sodium 139 136 - 145 mmol/L    Potassium 5.6 (H) 3.5 - 5.5 mmol/L    Chloride 105 100 - 111 mmol/L    CO2 29 21 - 32 mmol/L    Anion gap 5 3.0 - 18 mmol/L    Glucose 136 (H) 74 - 99 mg/dL    BUN 34 (H) 7.0 - 18 MG/DL    Creatinine 0.98 0.6 - 1.3 MG/DL    BUN/Creatinine ratio 35 (H) 12 - 20      GFR est AA >60 >60 ml/min/1.73m2    GFR est non-AA 56 (L) >60 ml/min/1.73m2    Calcium 9.1 8.5 - 10.1 MG/DL    Bilirubin, total 0.7 0.2 - 1.0 MG/DL    ALT (SGPT) 48 13 - 56 U/L    AST (SGOT) 38 10 - 38 U/L    Alk. phosphatase 88 45 - 117 U/L    Protein, total 6.2 (L) 6.4 - 8.2 g/dL    Albumin 2.4 (L) 3.4 - 5.0 g/dL    Globulin 3.8 2.0 - 4.0 g/dL    A-G Ratio 0.6 (L) 0.8 - 1.7     CBC WITH AUTOMATED DIFF    Collection Time: 08/09/22  6:20 AM   Result Value Ref Range    WBC 9.6 4.6 - 13.2 K/uL    RBC 4.08 (L) 4.20 - 5.30 M/uL    HGB 11.9 (L) 12.0 - 16.0 g/dL    HCT 37.3 35.0 - 45.0 %    MCV 91.4 78.0 - 100.0 FL    MCH 29.2 24.0 - 34.0 PG    MCHC 31.9 31.0 - 37.0 g/dL    RDW 14.6 (H) 11.6 - 14.5 %    PLATELET 195 792 - 857 K/uL    MPV 10.8 9.2 - 11.8 FL    NRBC 0.0 0  WBC    ABSOLUTE NRBC 0.00 0.00 - 0.01 K/uL    NEUTROPHILS 67 40 - 73 %    LYMPHOCYTES 23 21 - 52 %    MONOCYTES 7 3 - 10 %    EOSINOPHILS 0 0 - 5 %    BASOPHILS 0 0 - 2 %    IMMATURE GRANULOCYTES 3 (H) 0.0 - 0.5 %    ABS. NEUTROPHILS 6.5 1.8 - 8.0 K/UL    ABS. LYMPHOCYTES 2.2 0.9 - 3.6 K/UL    ABS. MONOCYTES 0.7 0.05 - 1.2 K/UL    ABS. EOSINOPHILS 0.0 0.0 - 0.4 K/UL    ABS. BASOPHILS 0.0 0.0 - 0.1 K/UL    ABS. IMM. GRANS. 0.3 (H) 0.00 - 0.04 K/UL    DF AUTOMATED         Signed By: Mireya Gotti MD     August 9, 2022      I spent 25 minutes with the patient in face-to-face consultation, of which greater than 50% was spent in counseling and coordination of care as described above    Disclaimer: Sections of this note are dictated using utilizing voice recognition software. Minor typographical errors may be present.  If questions arise, please do not hesitate to contact me or call our department.

## 2022-08-09 NOTE — ROUTINE PROCESS
Dressing changed to back - Santyl applied, with Dakins soaked Kerlix and ABD pad. Client tolerated well.

## 2022-08-09 NOTE — PROGRESS NOTES
Problem: Mobility Impaired (Adult and Pediatric)  Goal: *Acute Goals and Plan of Care (Insert Text)  Description: Physical Therapy Goals  Initiated 8/2/2022, re-evaluated 8/9/22 and goals adjusted and to be accomplished within 7 day(s)  1. Patient will move from supine to sit and sit to supine , scoot up and down, and roll side to side in bed with minimal assistance/contact guard assist.    2.  Patient will transfer from bed to chair and chair to bed with SBA using the least restrictive device. 3.  Patient will perform sit to stand with CGA  4. Patient will ambulate with minimal assistance/contact guard assist for 25 feet with the least restrictive device. 5.  Patient will ascend/descend stairs  as needed. 6.  Patient will perform BLE therex as prescribed    PLOF: Pt lives alone, reports she has a rollator, receives some assist from brothers      Outcome: Progressing Towards Goal   PHYSICAL THERAPY RE-EVALUATION    Patient: Geno Alvarado (13 y.o. female)  Date: 8/9/2022  Primary Diagnosis: Sepsis ([facility] [address] [A41.9]  Leukocytosis [D72.829]  Open wound of lower back [S31.000A]       Precautions:  Fall, Skin    ASSESSMENT :  Pt cleared to participate in PT session, pt received semi-reclined in bed and agreeable to therapy session. Completing with rehab tech to maximize safety and mobility. Based on the objective data described below, the patient presents with decreased endurance, decreased strength, decreased balance reactions, gait deviations, and decreased independence in functional mobility. Pt agreeable to sitting EOB but declined OOB to chair. Pt needing increased time this session, decreased motor planning. ModA for supine to sit. Sitting EOB with fair balance, assisted with donning socks, both foot blisters had popped prior to session. Pt then standing x2 reps with verbal cues and modA. Upon second stand pt with BM on bed and floor.  Returned to supine with modA, rolling with min-modA and dependent for jacinto. Pt positioned for comfort and educated to call for assist before getting up, pt verbalized understanding. Pt left with all needs met and call bell in reach. RN notified of position and participation. Patient will benefit from skilled intervention to address the above impairments. Patient's rehabilitation potential is considered to be Fair  Factors which may influence rehabilitation potential include:   []         None noted  []         Mental ability/status  [x]         Medical condition  [x]         Home/family situation and support systems  []         Safety awareness  []         Pain tolerance/management  []         Other:      PLAN :  Recommendations and Planned Interventions:   [x]           Bed Mobility Training             []    Neuromuscular Re-Education  [x]           Transfer Training                   []    Orthotic/Prosthetic Training  [x]           Gait Training                          []    Modalities  [x]           Therapeutic Exercises           []    Edema Management/Control  [x]           Therapeutic Activities            [x]    Family Training/Education  [x]           Patient Education  []           Other (comment):    Frequency/Duration: Patient will be followed by physical therapy 1-2 times per day/4-7 days per week to address goals. Further Equipment Recommendations for Discharge: rolling walker    AMPAC:   Based on an AM-PAC score of 13/24 and their current functional mobility deficits, it is recommended that the patient have 3-5 sessions per week of Physical Therapy at d/c to increase the patient's independence. This AMPAC score should be considered in conjunction with interdisciplinary team recommendations to determine the most appropriate discharge setting. Patient's social support, diagnosis, medical stability, and prior level of function should also be taken into consideration.      SUBJECTIVE:   Patient stated I think I can do everything I need to.    OBJECTIVE DATA SUMMARY:   Hospital course since last seen and reason for re-evaluation: Pt due for re-evaluation, pt demonstrating varied performance over the last week, declining OOB to chair this session. Pt seems to have intermittent confusion. Pt would continue to benefit from skilled PT to continue to progress towards goals.      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    Cancer ([facility] [address] 01/01/2014    breast    Ductal carcinoma in situ of breast     Functional quadriplegia (HCC)     as of 7/2022    Glaucoma     History of seizure disorder     Open back wound 07/2022    Pressure wound for lying on a table for indeterminate amount of time    Pituitary adenoma with extrasellar extension ([facility] [address]     Primary hypertension      Past Surgical History:   Procedure Laterality Date    HX HYSTERECTOMY      partial    HX MASTECTOMY  4/30/2014    RIGHT PARTIAL MASTECTOMY WITH NEEDLE LOCALIZATION MAMMOGRAM SENTINEL LYMPH NODE BIOPSY performed by Pasquale Orona MD at [facility] MAIN OR     Barriers to Learning/Limitations: yes;  physical and altered mental status (i.e.Sedation, Confusion)  Compensate with: Visual Cues, Verbal Cues, and Tactile Cues  Home Situation:   Home Situation  Home Environment: Private residence  # Steps to Enter: 0  One/Two Story Residence: One story  Living Alone: Yes  Support Systems: Other Family Member(s)  Patient Expects to be Discharged to[de-identified] Skilled nursing facility  Current DME Used/Available at Home: [address], [place], quad, Walker, rolling, [employer], rollator  Tub or Shower Type: Tub/Shower combination  Critical Behavior:  Neurologic State: Alert  Orientation Level: Oriented to person  Cognition: Follows commands  Safety/Judgement: Fall prevention  Psychosocial  Patient Behaviors: Cooperative  Purposeful Interaction: Yes  Pt Identified Daily Priority: Clinical issues (comment)  Chau Process: Nurture loving kindness  Caring Interventions: Reassure  Reassure: Therapeutic listening  Therapeutic Modalities: Intentional therapeutic touch    Strength:    Strength: Generally decreased, functional    Tone & Sensation:   Tone: Normal    Sensation: Intact    Range Of Motion:  AROM: Generally decreased, functional    Posture:  Posture (WDL): Within defined limits     Functional Mobility:  Bed Mobility:     Supine to Sit: Moderate assistance  Sit to Supine: Moderate assistance  Scooting: Maximum assistance  Transfers:  Sit to Stand: Moderate assistance  Stand to Sit: Minimum assistance    Balance:   Sitting: Impaired; With support  Sitting - Static: Good (unsupported)  Sitting - Dynamic: Fair (occasional) (+)  Standing: Impaired; With support  Standing - Static: Fair  Standing - Dynamic : Fair    Pain:  Pain level pre-treatment: 0/10   Pain level post-treatment: 0/10     Activity Tolerance:   Fair     Please refer to the flowsheet for vital signs taken during this treatment. After treatment:   []         Patient left in no apparent distress sitting up in chair  [x]         Patient left in no apparent distress in bed  [x]         Call bell left within reach  [x]         Nursing notified  []         Caregiver present  []         Bed alarm activated  []         SCDs applied    COMMUNICATION/EDUCATION:   [x]         Role of Physical Therapy in the acute care setting. [x]         Fall prevention education was provided and the patient/caregiver indicated understanding. [x]         Patient/family have participated as able in goal setting and plan of care. [x]         Patient/family agree to work toward stated goals and plan of care. []         Patient understands intent and goals of therapy, but is neutral about his/her participation. []         Patient is unable to participate in goal setting/plan of care: ongoing with therapy staff.  []         Other:     Thank you for this referral.  Kalpesh Peña PT   Time Calculation: 46 mins    [facility] AM-PAC® Basic Mobility Inpatient Short Form (6-Clicks) Version 2    How much HELP from another person does the patient currently need    (If the patient hasn't done an activity recently, how much help from another person do you think he/she would need if he/she tried?)   Total (Total A or Dep)   A Lot  (Mod to Max A)   A Little (Sup or Min A)   None (Mod I to I)   Turning from your back to your side while in a flat bed without using bedrails? [] 1 [] 2 [x] 3 [] 4   2. Moving from lying on your back to sitting on the side of a flat bed without using bedrails? [] 1 [x] 2 [] 3 [] 4   3. Moving to and from a bed to a chair (including a wheelchair)? [] 1 [x] 2 [] 3 [] 4   4. Standing up from a chair using your arms (e.g., wheelchair, or bedside chair)? [] 1 [x] 2 [] 3 [] 4   5. Walking in hospital room? [] 1 [x] 2 [] 3 [] 4   6. Climbing 3-5 steps with a railing?+   [] 1 [x] 2 [] 3 [] 4   +If stair climbing cannot be assessed, skip item #6. Sum responses from items 1-5.

## 2022-08-09 NOTE — PROGRESS NOTES
Behavioral Health Progress Note    Admit Date: 8/1/2022      Vitals : Patient Vitals for the past 8 hrs:   BP Temp Pulse Resp SpO2   08/09/22 1631 117/69 98.1 °F (36.7 °C) 76 18 98 %   08/09/22 1217 (!) 92/54 98.1 °F (36.7 °C) 79 18 95 %     Labs:    Recent Results (from the past 24 hour(s))   METABOLIC PANEL, COMPREHENSIVE    Collection Time: 08/09/22  3:28 AM   Result Value Ref Range    Sodium 139 136 - 145 mmol/L    Potassium 5.6 (H) 3.5 - 5.5 mmol/L    Chloride 105 100 - 111 mmol/L    CO2 29 21 - 32 mmol/L    Anion gap 5 3.0 - 18 mmol/L    Glucose 136 (H) 74 - 99 mg/dL    BUN 34 (H) 7.0 - 18 MG/DL    Creatinine 0.98 0.6 - 1.3 MG/DL    BUN/Creatinine ratio 35 (H) 12 - 20      GFR est AA >60 >60 ml/min/1.73m2    GFR est non-AA 56 (L) >60 ml/min/1.73m2    Calcium 9.1 8.5 - 10.1 MG/DL    Bilirubin, total 0.7 0.2 - 1.0 MG/DL    ALT (SGPT) 48 13 - 56 U/L    AST (SGOT) 38 10 - 38 U/L    Alk. phosphatase 88 45 - 117 U/L    Protein, total 6.2 (L) 6.4 - 8.2 g/dL    Albumin 2.4 (L) 3.4 - 5.0 g/dL    Globulin 3.8 2.0 - 4.0 g/dL    A-G Ratio 0.6 (L) 0.8 - 1.7     CBC WITH AUTOMATED DIFF    Collection Time: 08/09/22  6:20 AM   Result Value Ref Range    WBC 9.6 4.6 - 13.2 K/uL    RBC 4.08 (L) 4.20 - 5.30 M/uL    HGB 11.9 (L) 12.0 - 16.0 g/dL    HCT 37.3 35.0 - 45.0 %    MCV 91.4 78.0 - 100.0 FL    MCH 29.2 24.0 - 34.0 PG    MCHC 31.9 31.0 - 37.0 g/dL    RDW 14.6 (H) 11.6 - 14.5 %    PLATELET 816 816 - 725 K/uL    MPV 10.8 9.2 - 11.8 FL    NRBC 0.0 0  WBC    ABSOLUTE NRBC 0.00 0.00 - 0.01 K/uL    NEUTROPHILS 67 40 - 73 %    LYMPHOCYTES 23 21 - 52 %    MONOCYTES 7 3 - 10 %    EOSINOPHILS 0 0 - 5 %    BASOPHILS 0 0 - 2 %    IMMATURE GRANULOCYTES 3 (H) 0.0 - 0.5 %    ABS. NEUTROPHILS 6.5 1.8 - 8.0 K/UL    ABS. LYMPHOCYTES 2.2 0.9 - 3.6 K/UL    ABS. MONOCYTES 0.7 0.05 - 1.2 K/UL    ABS. EOSINOPHILS 0.0 0.0 - 0.4 K/UL    ABS. BASOPHILS 0.0 0.0 - 0.1 K/UL    ABS. IMM.  GRANS. 0.3 (H) 0.00 - 0.04 K/UL    DF AUTOMATED       Meds: Current Facility-Administered Medications   Medication Dose Route Frequency    meropenem (MERREM) 1 g in 0.9% sodium chloride (MBP/ADV) 50 mL MBP  1 g IntraVENous Q8H    collagenase (SANTYL) 250 unit/gram ointment   Topical BID    sodium hypochlorite (QUARTER STRENGTH DAKIN'S) 0.125% irrigation (bottle)   Topical BID    melatonin (rapid dissolve) tablet 5 mg  5 mg Oral QHS PRN    sodium chloride (NS) flush 5-40 mL  5-40 mL IntraVENous Q8H    sodium chloride (NS) flush 5-40 mL  5-40 mL IntraVENous PRN    acetaminophen (TYLENOL) tablet 650 mg  650 mg Oral Q6H PRN    Or    acetaminophen (TYLENOL) suppository 650 mg  650 mg Rectal Q6H PRN    polyethylene glycol (MIRALAX) packet 17 g  17 g Oral DAILY PRN    ondansetron (ZOFRAN ODT) tablet 4 mg  4 mg Oral Q8H PRN    Or    ondansetron (ZOFRAN) injection 4 mg  4 mg IntraVENous Q6H PRN    albuterol-ipratropium (DUO-NEB) 2.5 MG-0.5 MG/3 ML  3 mL Nebulization Q6H PRN    nitroglycerin (NITROBID) 2 % ointment 0.5 Inch  0.5 Inch Topical Q6H PRN    naloxone (NARCAN) injection 0.4 mg  0.4 mg IntraVENous EVERY 2 MINUTES AS NEEDED    amLODIPine (NORVASC) tablet 10 mg  10 mg Oral DAILY    cyanocobalamin (VITAMIN B12) sublingual tablet 2,500 mcg  2,500 mcg Oral DAILY    dexAMETHasone (DECADRON) tablet 2 mg  2 mg Oral Q12H    divalproex ER (DEPAKOTE ER) 24 hour tablet 500 mg  500 mg Oral DAILY    gabapentin (NEURONTIN) capsule 300 mg  300 mg Oral BID    hydrALAZINE (APRESOLINE) tablet 25 mg  25 mg Oral TID    levothyroxine (SYNTHROID) tablet 88 mcg  88 mcg Oral 6am    atenoloL (TENORMIN) tablet 100 mg  100 mg Oral DAILY    oxyCODONE-acetaminophen (PERCOCET) 5-325 mg per tablet 1 Tablet  1 Tablet Oral Q4H PRN    pyridoxine (vitamin B6) (VITAMIN B-6) tablet 200 mg  200 mg Oral DAILY      Hospital Problems: Principal Problem:    Severe sepsis ([address] (8/1/2022)    Active Problems:    History of breast cancer (5/16/2016)      Open wound of lower back (8/1/2022)      Wound of left leg (8/1/2022)      Glaucoma (8/1/2022)      Primary hypertension (8/1/2022)      Acquired hypothyroidism (8/1/2022)      Obesity (BMI 30.0-34.9) (8/1/2022)      Overview: BMI 30.70 on 8/01/2022      Impaired mobility and ADLs (8/1/2022)      Overview: Patient is mobile with use of Rolator and has significant debility. Subjective:       Mental Status Exam  Sensorium: alert  Orientation: only aware of  time (year), place, person, and situation  Relations: guarded  Eye Contact: poor  Appearance: laying on bed with both feet elevated in padding  Thought Process: slow rate of thoughts and poor abstract reasoning/computation   Thought Content: poverty of thought   Suicidal: denies   Homicidal:  none    Mood: unhappy   Affect: stable  Memory: is impaired and is recent has little recall as to what happened prior to admission  Concentration: distractable  Abstraction: concrete  Insight: The patient shows little insight    OR Fair  Judgement: shows no evidence of impairment    Assessment/Plan:       Psychiatry note. We are seen in consultation for question of capacity. Patient does not have prior psychiatric contacts that we can say. 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 When seen on 7/14/2022 she was described to be perseverating and aware person and place and was described to have missed her appointment for biopsy of the brain mass. Patient had been admitted to [facility] that time and initially was seen is not able to have capacity since she had been more septic. She had subsequent repeat psychiatric evaluation as she improved and had been found to have capacity to make decisions regarding her own care. She had filled out DNR paperwork and gave notice that she wished to be at home and spend her remaining time at her home.   She could not give specifics as to who was going to be with her. Patient has been readmitted here after being found on the floor. She again is now saying that she wants to be able to spend her time at her home. She said that if there was any prospect of a brain surgery making any difference she would probably agree to it but has not heard that to be the case. She was saying that her brothers and her sister who live in [place] could spend time with her at her home or she can have someone come in to help her and be with her. She prefers to be in her own home rather than go stay with one of them. She says that she would agree to take antibiotics by mouth at home and does not want to stay in the hospital to take IV antibiotics. She denied suicidal ideas. She is denying hallucinations or delusions. Patient was aware she was in [facility] where her of her name year. She did have some confusion initially as to why she was in originally in the hospital but then did acknowledge that she was here for what may have been a seizure and infection. Assessment: At the current time the patient does have the capacity to make decisions regarding her medical care and living arrangements. That may not be the case at times if she were to be more infected or to be having a series of seizures or worsening of her brain cancer . At the current time she wants to be able to go home. She says that she would contact siblings to have someone stay with her. I did repeatedly emphasized to her that it would be important for her to have someone with her all day long. Voiced understanding of this. It does appear that they already began trying to make arrangements for hospice care but it does not appear that it actually went through or that it somehow got delayed. I would suggest that this occurs. I do not have any suggestions in regards to medications. We will sign off at this point.

## 2022-08-09 NOTE — PROGRESS NOTES
RENAL DOSING DAILY    Patient clinical status and labs ordered/reviewed. Pt Weight Weight: 83.1 kg (183 lb 3.2 oz)   Serum Creatinine Lab Results   Component Value Date/Time    Creatinine 0.98 08/09/2022 03:28 AM    Creatinine, POC 1.2 03/18/2014 11:01 AM       Creatinine Clearance Estimated Creatinine Clearance: 56.1 mL/min (based on SCr of 0.98 mg/dL). BUN Lab Results   Component Value Date/Time    BUN 34 (H) 08/09/2022 03:28 AM       WBC Lab Results   Component Value Date/Time    WBC 9.6 08/09/2022 06:20 AM      Temperature Temp: 97.9 °F (36.6 °C)   HR Pulse (Heart Rate): 65     BP BP: 135/75           MAR reviewed for dose administration times. Current Dose is inadequate as renal function has and improved . Dose will be changed to meropenem 1gm ivpb q8h extended infusion    Continue to monitor.     Signed: BILLY Campbell [facility]  Date 8/9/2022  Time 8:56 AM

## 2022-08-10 LAB
ALBUMIN SERPL-MCNC: 2.4 G/DL (ref 3.4–5)
ALBUMIN/GLOB SERPL: 0.6 {RATIO} (ref 0.8–1.7)
ALP SERPL-CCNC: 99 U/L (ref 45–117)
ALT SERPL-CCNC: 48 U/L (ref 13–56)
ANION GAP SERPL CALC-SCNC: 4 MMOL/L (ref 3–18)
AST SERPL-CCNC: 31 U/L (ref 10–38)
BASOPHILS # BLD: 0 K/UL (ref 0–0.1)
BASOPHILS NFR BLD: 0 % (ref 0–2)
BILIRUB SERPL-MCNC: 0.4 MG/DL (ref 0.2–1)
BUN SERPL-MCNC: 36 MG/DL (ref 7–18)
BUN/CREAT SERPL: 39 (ref 12–20)
CALCIUM SERPL-MCNC: 9.3 MG/DL (ref 8.5–10.1)
CHLORIDE SERPL-SCNC: 105 MMOL/L (ref 100–111)
CO2 SERPL-SCNC: 30 MMOL/L (ref 21–32)
CREAT SERPL-MCNC: 0.93 MG/DL (ref 0.6–1.3)
DIFFERENTIAL METHOD BLD: ABNORMAL
EOSINOPHIL # BLD: 0 K/UL (ref 0–0.4)
EOSINOPHIL NFR BLD: 0 % (ref 0–5)
ERYTHROCYTE [DISTWIDTH] IN BLOOD BY AUTOMATED COUNT: 14.3 % (ref 11.6–14.5)
GLOBULIN SER CALC-MCNC: 3.9 G/DL (ref 2–4)
GLUCOSE SERPL-MCNC: 101 MG/DL (ref 74–99)
HCT VFR BLD AUTO: 35.1 % (ref 35–45)
HGB BLD-MCNC: 11.5 G/DL (ref 12–16)
IMM GRANULOCYTES # BLD AUTO: 0.2 K/UL (ref 0–0.04)
IMM GRANULOCYTES NFR BLD AUTO: 3 % (ref 0–0.5)
LYMPHOCYTES # BLD: 3 K/UL (ref 0.9–3.6)
LYMPHOCYTES NFR BLD: 36 % (ref 21–52)
MCH RBC QN AUTO: 30 PG (ref 24–34)
MCHC RBC AUTO-ENTMCNC: 32.8 G/DL (ref 31–37)
MCV RBC AUTO: 91.6 FL (ref 78–100)
MONOCYTES # BLD: 0.9 K/UL (ref 0.05–1.2)
MONOCYTES NFR BLD: 10 % (ref 3–10)
NEUTS SEG # BLD: 4.3 K/UL (ref 1.8–8)
NEUTS SEG NFR BLD: 51 % (ref 40–73)
NRBC # BLD: 0.02 K/UL (ref 0–0.01)
NRBC BLD-RTO: 0.2 PER 100 WBC
PLATELET # BLD AUTO: 191 K/UL (ref 135–420)
PMV BLD AUTO: 11.3 FL (ref 9.2–11.8)
POTASSIUM SERPL-SCNC: 4.8 MMOL/L (ref 3.5–5.5)
PROT SERPL-MCNC: 6.3 G/DL (ref 6.4–8.2)
RBC # BLD AUTO: 3.83 M/UL (ref 4.2–5.3)
SODIUM SERPL-SCNC: 139 MMOL/L (ref 136–145)
WBC # BLD AUTO: 8.5 K/UL (ref 4.6–13.2)

## 2022-08-10 PROCEDURE — 65270000046 HC RM TELEMETRY

## 2022-08-10 PROCEDURE — 74011250636 HC RX REV CODE- 250/636: Performed by: INTERNAL MEDICINE

## 2022-08-10 PROCEDURE — 74011000258 HC RX REV CODE- 258: Performed by: INTERNAL MEDICINE

## 2022-08-10 PROCEDURE — 74011250637 HC RX REV CODE- 250/637: Performed by: HOSPITALIST

## 2022-08-10 PROCEDURE — 36573 INSJ PICC RS&I 5 YR+: CPT | Performed by: INTERNAL MEDICINE

## 2022-08-10 PROCEDURE — 74011000250 HC RX REV CODE- 250: Performed by: SURGERY

## 2022-08-10 PROCEDURE — 2709999900 HC NON-CHARGEABLE SUPPLY

## 2022-08-10 PROCEDURE — 99232 SBSQ HOSP IP/OBS MODERATE 35: CPT | Performed by: INTERNAL MEDICINE

## 2022-08-10 PROCEDURE — 36569 INSJ PICC 5 YR+ W/O IMAGING: CPT

## 2022-08-10 PROCEDURE — 36415 COLL VENOUS BLD VENIPUNCTURE: CPT

## 2022-08-10 PROCEDURE — 77030038269 HC DRN EXT URIN PURWCK BARD -A

## 2022-08-10 PROCEDURE — 77030040393 HC DRSG OPTIFOAM GENT MDII -B

## 2022-08-10 PROCEDURE — 80053 COMPREHEN METABOLIC PANEL: CPT

## 2022-08-10 PROCEDURE — 74011000250 HC RX REV CODE- 250: Performed by: INTERNAL MEDICINE

## 2022-08-10 PROCEDURE — 74011250637 HC RX REV CODE- 250/637: Performed by: INTERNAL MEDICINE

## 2022-08-10 PROCEDURE — 85025 COMPLETE CBC W/AUTO DIFF WBC: CPT

## 2022-08-10 PROCEDURE — 99232 SBSQ HOSP IP/OBS MODERATE 35: CPT | Performed by: NURSE PRACTITIONER

## 2022-08-10 RX ORDER — LIDOCAINE HYDROCHLORIDE 10 MG/ML
1 INJECTION, SOLUTION EPIDURAL; INFILTRATION; INTRACAUDAL; PERINEURAL ONCE
Status: COMPLETED | OUTPATIENT
Start: 2022-08-10 | End: 2022-08-10

## 2022-08-10 RX ADMIN — Medication 2500 MCG: at 08:01

## 2022-08-10 RX ADMIN — GABAPENTIN 300 MG: 300 CAPSULE ORAL at 08:02

## 2022-08-10 RX ADMIN — LEVOTHYROXINE SODIUM 88 MCG: 88 TABLET ORAL at 06:38

## 2022-08-10 RX ADMIN — HYDRALAZINE HYDROCHLORIDE 25 MG: 50 TABLET, FILM COATED ORAL at 21:55

## 2022-08-10 RX ADMIN — OXYCODONE HYDROCHLORIDE AND ACETAMINOPHEN 1 TABLET: 5; 325 TABLET ORAL at 14:26

## 2022-08-10 RX ADMIN — HYDRALAZINE HYDROCHLORIDE 25 MG: 50 TABLET, FILM COATED ORAL at 17:02

## 2022-08-10 RX ADMIN — OXYCODONE HYDROCHLORIDE AND ACETAMINOPHEN 1 TABLET: 5; 325 TABLET ORAL at 08:28

## 2022-08-10 RX ADMIN — Medication 5 MG: at 21:55

## 2022-08-10 RX ADMIN — SODIUM CHLORIDE, PRESERVATIVE FREE 10 ML: 5 INJECTION INTRAVENOUS at 06:38

## 2022-08-10 RX ADMIN — DEXAMETHASONE 2 MG: 2 TABLET ORAL at 08:01

## 2022-08-10 RX ADMIN — GABAPENTIN 300 MG: 300 CAPSULE ORAL at 17:29

## 2022-08-10 RX ADMIN — DIVALPROEX SODIUM 500 MG: 500 TABLET, EXTENDED RELEASE ORAL at 08:02

## 2022-08-10 RX ADMIN — MEROPENEM 1 G: 1 INJECTION INTRAVENOUS at 06:38

## 2022-08-10 RX ADMIN — AMLODIPINE BESYLATE 10 MG: 10 TABLET ORAL at 08:02

## 2022-08-10 RX ADMIN — LIDOCAINE HYDROCHLORIDE 1 ML: 10 INJECTION, SOLUTION EPIDURAL; INFILTRATION; INTRACAUDAL; PERINEURAL at 15:00

## 2022-08-10 RX ADMIN — PYRIDOXINE HCL TAB 50 MG 200 MG: 50 TAB at 08:01

## 2022-08-10 RX ADMIN — DAKIN'S SOLUTION 0.125% (QUARTER STRENGTH): 0.12 SOLUTION at 08:05

## 2022-08-10 RX ADMIN — DEXAMETHASONE 2 MG: 2 TABLET ORAL at 21:55

## 2022-08-10 RX ADMIN — HYDRALAZINE HYDROCHLORIDE 25 MG: 50 TABLET, FILM COATED ORAL at 08:02

## 2022-08-10 RX ADMIN — ATENOLOL 100 MG: 50 TABLET ORAL at 08:01

## 2022-08-10 RX ADMIN — COLLAGENASE SANTYL: 250 OINTMENT TOPICAL at 08:06

## 2022-08-10 NOTE — ROUTINE PROCESS
Bedside and Verbal shift change report given to Daphnie Corona RN (oncoming nurse) by Jovon Gaming RN (offgoing nurse). Report included the following information SBAR.

## 2022-08-10 NOTE — PROGRESS NOTES
Infectious Disease progress Note        Reason: Gram-negative bloodstream infection    Current abx Prior abx   Zosyn since 8/1/2022  levofloxacin, vancomycin 8/1-8/3     Lines:       Assessment :     74 y.o. female with PMHx significant for htn, seizures, breast cancer, recently diagnosed glioblastoma with vasogenic edema (7/2022), recent hospitalization at [facility] who presented to [facility] ER on 8/1/22 with complaint of Left Foot Blister and Left Ankle Pain. [address] 7/1-7/4 for fall, diagnosed to have glioblastoma multiforme with mass effect s/p decadron    Hospitalization at [address] 7/14/22-7/22/22 for fall, AMS (declined hospice during that admission)    Clinical presentation concerning for sepsis-present on admission due to Bacteroides bloodstream infection.  -> Blood culture negative at 6 days    ->intera-abdominal source- No evidence of GI infection noted clinically , but CT a/p showed   4.8 x 3.1 cm fluid collection in the right lateral lumbar region-  Lumbar soft tissue abscess as a source of Bacteroides bloodstream infection-  ->S/p bedside I&D. 8/5. grossly necrotic wounds with juanjose pus to bone- cx positive for polyorganism- Enterobacter cloacae complex, Proteus, and Citrobacter spp as well as Bacteroides. Immunocompromise state due to concurrent steroids can mask the full clinical presentation    Surgery follow up appreciated. Plans for bedside I&D noted    Necrotic lower back ulcers, pressure necrosis right heel-likely due to prolonged period of unresponsiveness/pressure injury July 2022    bilateral foot blisters, left leg ulcer-no clinical evidence of infection noted on today's exam.  Wound culture 8/1/2022-Klebsiella, proteus, e.coli, MSSA-likely colonizer    Glioblastoma multiforme-plans for outpatient neurosurgery noted. Currently on Decadron  Distant history of seizures-on Depakote    Recommendations:    Continue meropenem, given R for Piptazo demonstrated by Enterobacter.  Low risk of increased seizure activity with meropenem, drug interaction with Depakote-    -She had neurology's evaluation for need for AEDs  given extent of cx to the the bone- she will need a long course of IV abx- 6 weeks. End date: September 19    Okay to have PICC line-we will need home health IV antibiotics, with weekly labs to include CBC and CMP while on IV antibiotics. PCP to follow-up results  Continue local wound care left leg ulcer, pressure offloading right heel, wound care back ulcers       Poor chances of wound healing looking at the extent of infection, and need for steroids. Follow up  wound cx & modify abx accordingly. F/u palliative care and psych consults to discuss goals of care. Above plan was discussed in details with patient and primary team. Please call me if any further questions or concerns. Will continue to participate in the care of this patient. HPI:    No fevers, or ever before. No n/v/d. NO abd pain reported;     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    Cancer ([facility] [address] 01/01/2014    breast    Ductal carcinoma in situ of breast     Functional quadriplegia ([address]     as of 7/2022    Glaucoma     History of seizure disorder     Open back wound 07/2022    Pressure wound for lying on a table for indeterminate amount of time    Pituitary adenoma with extrasellar extension ([address]     Primary hypertension        Past Surgical History:   Procedure Laterality Date    HX HYSTERECTOMY      partial    HX MASTECTOMY  4/30/2014    RIGHT PARTIAL MASTECTOMY WITH NEEDLE LOCALIZATION MAMMOGRAM SENTINEL LYMPH NODE BIOPSY performed by Ramona Cano MD at [address] [place] [address] Medication List      Details   amLODIPine (NORVASC) 10 mg tablet Take 10 mg by mouth in the morning. dexAMETHasone (DECADRON) 2 mg tablet Take  by mouth every twelve (12) hours. levothyroxine (SYNTHROID) 88 mcg tablet Take 88 mcg by mouth Daily (before breakfast). divalproex ER (DEPAKOTE ER) 500 mg ER tablet Take 500 mg by mouth. hydrALAZINE (APRESOLINE) 25 mg tablet Take 25 mg by mouth three (3) times daily. gabapentin (NEURONTIN) 300 mg capsule Take 1 tab PO QHS x1 week then increase to BID thereafter  Qty: 60 Cap, Refills: 1      baclofen (LIORESAL) 10 mg tablet Take 1 Tab by mouth three (3) times daily. Qty: 90 Tab, Refills: 0      tamoxifen (NOLVADEX) 10 mg tablet Take  by mouth daily. oxyCODONE-acetaminophen (PERCOCET) 5-325 mg per tablet 1 or 2 tablets by mouth every 4 hours as needed  Qty: 40 Tab, Refills: 0      !! OTHER Vitamin D Po      nebivoloL (BYSTOLIC) 20 mg tablet Take  by mouth daily. torsemide (DEMADEX) 20 mg tablet Take  by mouth daily. !! OTHER Occuvite eye vitamin OTC      cyanocobalamin (VITAMIN B12) 2,500 mcg sublingual tablet Take 2,500 mcg by mouth daily. pyridoxine, vitamin B6, 200 mg tablet Take 200 mg by mouth daily. BIOTIN PO Take 1,000 mg by mouth. diazePAM (VALIUM) 10 mg tablet TAKE 1 TAB PO 30 MINS PRIOR TO MRI (MUST HAVE SOMEONE TO DRIVE TO AND FROM THE FACILITY)  Qty: 1 Tab, Refills: 0    Associated Diagnoses: Lumbar radiculitis; Lumbar spondylosis      diclofenac (VOLTAREN) 1 % gel QID to affected areas PRN for pain  Qty: 500 g, Refills: 5       !! - Potential duplicate medications found. Please discuss with provider.           Current Facility-Administered Medications   Medication Dose Route Frequency    meropenem (MERREM) 1 g in 0.9% sodium chloride (MBP/ADV) 50 mL MBP  1 g IntraVENous Q8H    collagenase (SANTYL) 250 unit/gram ointment   Topical BID    sodium hypochlorite (QUARTER STRENGTH DAKIN'S) 0.125% irrigation (bottle)   Topical BID    melatonin (rapid dissolve) tablet 5 mg  5 mg Oral QHS PRN    sodium chloride (NS) flush 5-40 mL  5-40 mL IntraVENous Q8H    sodium chloride (NS) flush 5-40 mL  5-40 mL IntraVENous PRN    acetaminophen (TYLENOL) tablet 650 mg  650 mg Oral Q6H PRN    Or    acetaminophen (TYLENOL) suppository 650 mg  650 mg Rectal Q6H PRN    polyethylene glycol (MIRALAX) packet 17 g  17 g Oral DAILY PRN    ondansetron (ZOFRAN ODT) tablet 4 mg  4 mg Oral Q8H PRN    Or    ondansetron (ZOFRAN) injection 4 mg  4 mg IntraVENous Q6H PRN    albuterol-ipratropium (DUO-NEB) 2.5 MG-0.5 MG/3 ML  3 mL Nebulization Q6H PRN    nitroglycerin (NITROBID) 2 % ointment 0.5 Inch  0.5 Inch Topical Q6H PRN    naloxone (NARCAN) injection 0.4 mg  0.4 mg IntraVENous EVERY 2 MINUTES AS NEEDED    amLODIPine (NORVASC) tablet 10 mg  10 mg Oral DAILY    cyanocobalamin (VITAMIN B12) sublingual tablet 2,500 mcg  2,500 mcg Oral DAILY    dexAMETHasone (DECADRON) tablet 2 mg  2 mg Oral Q12H    divalproex ER (DEPAKOTE ER) 24 hour tablet 500 mg  500 mg Oral DAILY    gabapentin (NEURONTIN) capsule 300 mg  300 mg Oral BID    hydrALAZINE (APRESOLINE) tablet 25 mg  25 mg Oral TID    levothyroxine (SYNTHROID) tablet 88 mcg  88 mcg Oral 6am    atenoloL (TENORMIN) tablet 100 mg  100 mg Oral DAILY    oxyCODONE-acetaminophen (PERCOCET) 5-325 mg per tablet 1 Tablet  1 Tablet Oral Q4H PRN    pyridoxine (vitamin B6) (VITAMIN B-6) tablet 200 mg  200 mg Oral DAILY       Allergies: Patient has no known allergies.     Family History   Problem Relation Age of Onset    Colon Cancer Mother      Social History     Socioeconomic History    Marital status:      Spouse name: Not on file    Number of children: Not on file    Years of education: Not on file    Highest education level: Not on file   Occupational History    Not on file   Tobacco Use    Smoking status: Never    Smokeless tobacco: Never   Substance and Sexual Activity    Alcohol use: No     Alcohol/week: 0.0 standard drinks    Drug use: No    Sexual activity: Not on file   Other Topics Concern    Not on file   Social History Narrative    Not on file     Social Determinants of Health Financial Resource Strain: Not on file   Food Insecurity: Not on file   Transportation Needs: Not on file   Physical Activity: Not on file   Stress: Not on file   Social Connections: Not on file   Intimate Partner Violence: Not on file   Housing Stability: Not on file     Social History     Tobacco Use   Smoking Status Never   Smokeless Tobacco Never        Temp (24hrs), Av.1 °F (36.7 °C), Min:97.3 °F (36.3 °C), Max:98.9 °F (37.2 °C)    Visit Vitals  /66   Pulse 63   Temp 98.2 °F (36.8 °C)   Resp 17   Ht 5' 3\" (1.6 m)   Wt 83.1 kg (183 lb 3.2 oz)   SpO2 97%   BMI 32.45 kg/m²       ROS: 12 point ROS obtained in details. Pertinent positives as mentioned in HPI,   otherwise negative    Physical Exam:    General:  female patient laying on the bed AAOx3 in no acute distress. General:   awake alert and oriented   HEENT:  Normocephalic, atraumatic, right eye cataract?,   Dentition good. Neck supple, no bruits. Lymph Nodes:   Not examined   Lungs:   non-labored, bilateral chest movements equal, no audible wheezing   Heart:  RRR, s1 and s2; no  rubs or gallops, no edema   Abdomen:  soft, non-distended,  no hepatomegaly, no splenomegaly. Non-tender   Genitourinary:  No galvez   Extremities:   superficial pressure necrosis right heel, blister on dorsal aspect of left foot-no surrounding erythema. Superficial ulceration medial aspect of right leg with red granulation tissue-no purulent drainage/no surrounding erythema, muscle mass appropriate for age   Neurologic:  No gross focal sensory abnormalities; 5/5 muscle strength to upper and lower extremities. Speech appropriate. Cranial nerves intact                        Skin:  Skin changes bilateral lower extremity as mentioned above.   Linear ulceration lumbar region with dry necrotic tissue-no surrounding erythema, no purulent drainage; dry necrotic ulcer upper back; superficial ulcer right buttock- no drainage   Back: Stage IV decubitus pressure ulcer of the back-down to muscle fascia-wound looks clean out any signs of active infection   Psychiatric:  No suicidal or homicidal ideations, appropriate mood and affect         Labs: Results:   Chemistry Recent Labs     08/10/22  0233 08/09/22  0328 08/08/22  1109   * 136* 137*    139 138   K 4.8 5.6* 5.8*    105 105   CO2 30 29 27   BUN 36* 34* 34*   CREA 0.93 0.98 1.11   CA 9.3 9.1 9.5   AGAP 4 5 6   BUCR 39* 35* 31*   AP 99 88 109   TP 6.3* 6.2* 7.1   ALB 2.4* 2.4* 2.6*   GLOB 3.9 3.8 4.5*   AGRAT 0.6* 0.6* 0.6*      CBC w/Diff Recent Labs     08/10/22  0233 08/09/22  0620 08/08/22  1109   WBC 8.5 9.6 15.0*   RBC 3.83* 4.08* 4.36   HGB 11.5* 11.9* 13.0   HCT 35.1 37.3 40.0    169 136   GRANS 51 67 59   LYMPH 36 23 34   EOS 0 0 0      Microbiology No results for input(s): CULT in the last 72 hours. RADIOLOGY:    All available imaging studies/reports in [facility] for this admission were reviewed    Total time spent >30 minutes. High complexity decision making was performed during the evaluation of this patient at high risk for decompensation     Above mentioned total time spent on reviewing the case/medical record/data/notes/EMR/patient examination/documentation/coordinating care with nurse/consultants, exclusive of procedures with complex decision making performed and > 50% time spent in face to face evaluation. Disclaimer: Sections of this note are dictated utilizing voice recognition software, which may have resulted in some phonetic based errors in grammar and contents. Even though attempts were made to correct all the mistakes, some may have been missed, and remained in the body of the document. If questions arise, please contact our department.     Dr. Consuelo Rhoades, Infectious Disease Specialist    August 10, 2022

## 2022-08-10 NOTE — ACP (ADVANCE CARE PLANNING)
[address] [phone]     General Advance Care Planning (ACP) Tiffany Mancia, HAWA and this SW met with pt for follow up. NP reintroduced Palliative team. Pt engaged in conversation appropriately. NP addressed MPoA, and pt reports she has filled out paperwork in the past. NP explained we don't have that paperwork available and asked if she'd completed it for our files. Pt reported yes. AMD completed for pt. The following identified:     NataA- Dina Gil  Relationship:  Brother   Contact Information:  [phone]      Pt confirmed she informed MD, she wants to be a DNR. Pt chose to complete POST to document this decision. POST completed by Fazal García, for DNR/DNI/Long-term FEEDING TUBE if required. POST signed by NP and pt. CODE STATUS:  DNR / DNI     Advanced Steps [facility] (Physician Orders for Scope of Treatment)       Date of conversation:  08/10/2022 Location:  [facility]   Length (minutes): 20    Participants:   [x] Patient     Advanced Steps® ACP Facilitator: Fazal García LMSW      Conversation Topics   (If Patient does not have decision making capacity, Agent/Surrogate responds based on understanding of how patient would respond if capable)    Understanding of Medical Condition/s AND Potential Complications:    Patient response:Pt verbalized understanding of medical condition and potential complications there of. Identifies the following as important for living well: Being at home. Hopes: Will return home soon       Cultural, Congregation, spiritual, or personal beliefs described as:None stated    Needs to discuss with spiritual/Congregation advisor: [] Yes  [x] No    Needs more information about illness and complications:  [] Yes  [x] No      Cardiopulmonary Resuscitation      \"What do you understand about CPR? \" Response: I do not want CPR    Order Elected for CPR:  []  Attempt Resuscitation [x]  Do Not Attempt Resuscitation      When NOT in Cardiopulmonary Arrest, Order Elected:      [] Comfort Measures  [x] Limited Additional Interventions  [] Full Interventions    Artificially Administered Nutrition, Order Elected:    [] No Feeding Tube   [] Feeding Tube for a defined trial period  [x] Feeding Tube long-term if indicated    Meeting Outcomes:   [x] ACP discussion completed   [x] Sabina form completed  [x] Sabina prepared for Provider review and signature   [x] Original placed on Chart, if in facility (form to be sent with patient at discharge)  [x] Copy given to healthcare agent    [x] Copy scanned to electronic medical record    Dami Shepherd, [facility]   Damon Anthony 74: [phone])

## 2022-08-10 NOTE — PROGRESS NOTES
Attempted to place double lumen PICC in left basilic. Unable to thread above level of axilla. Catheter cont to coil. Call made to Dr Tawanda Tao to recommend referral to IR for PICC placement. 1528 spoke to Dr Dante Deleon will refer to IR for placement.

## 2022-08-10 NOTE — CONSULTS
Neurology    Today I had the pleasure of interviewing and examining Jeremy Sinclair a 53-year-old woman who is hospitalized for wound care. He has a history of seizure disorder since 2008 she is treated with low-dose Depakote  mg a day. According to her she is not had a seizure in the past 22 years. Is followed by Go Cisneros at [facility]. On her neurological examination she is alert and oriented x3. Speech is fluent without any evidence of dysphasia or dysarthria. Her Mini-Mental status examination is normal.  Cranial nerves are intact. There is no pronator drift. She has good strength in all her extremities. Sensory exam is intact to all modalities. Reflexes are symmetric. Assessment and plan: The question is whether she would be maintained on low-dose Depakote or not.   We could get a EEG on her before she leaves and discuss continuing medication after the test.

## 2022-08-10 NOTE — PROGRESS NOTES
[facility]: [phone]  [facility]: [phone]     Patient Name: Rae Bertrand  YOB: 1955    Date of progress note: 8/10/22  Reason for Consult: establish goals of care  Requesting Provider: Derrell Giang MD    Primary Care Physician: Christiane Gupta MD      SUMMARY:   Rae Bertrand is a 74 y.o. female with a past history of breast cancer, obesity, primary hypertension, who was admitted on 8/1/2022 from home with a diagnosis of open wound lower back and left leg. Current medical issues leading to Palliative Medicine involvement include: Patient with likely glioblastoma with resulting healthcare related issues. CHIEF COMPLAINT: Left leg pain    HPI/SUBJECTIVE:    Patient is a 75-year-old -American female that lives at home alone. She reports that she has 4 siblings to that she is close to. She was recently diagnosed with a brain mass and was supposed to undergo a brain mass biopsy for possible glioblastoma but has not yet undergone the procedure. Pt's primary oncologist is Dr Levon Rodriguez and her Neurosurgeon is Dr Checo Clark. The patient is:   [x] Verbal and participatory  [] Non-participatory due to:     GOALS OF CARE:  Patient/Health Care Proxy Stated Goals: Prolong life      TREATMENT PREFERENCES:   Code Status: DNR         PALLIATIVE DIAGNOSES:   Goals of care/ACP  Brain mass  Breast Cancer  Wounds  Debility       PLAN:   8/10/22: This NP along with Antoine Chaidez LMSW in to see the patient at the bedside. She is alert and interactive remains skeptical of medical people coming in her room. We explained the reason for our visit is to ensure that we have the right information on file and that we understand from the Psychiatrist who saw her yesterday that she desired to be a DNR. She confirmed and stated that she has the paperwork at home.  Since unable to confirm this she was agreeable to complete a new POST today and do another AMD to ensure that it is filed in our system. POST done for DNR/DNI limited interventions and OK with long term feeding tube. AMD completed naming her Brother Tiffanie Wing. She does not wish for us to contact him at this time. She is awaiting discharge disposition which is being handled through the case management department. Since her [address] are established our team will sign off. Please feel free to re-consult us if there is a change in her overall status that requires revisiting her goals of care. Goals of care: DNR/DNI Limited interventions, long term feeding tube if warranted. MD hospitalist and [facility] made aware. POST is on file. See below for prior discussions:      Goals of care/ACP  This NP along with Leif Phillip RN in to see patient at the bedside. She has an obvious impairment of her right eye and when asked about her vision status she stated \"cannot you tell that I am blind\" I asked if she was totally blind and she stated \"well I can see you\" patient seemed easily irritated by questions and when we introduced ourselves as palliative medicine she looked extremely pensive. We explained that we are supportive service here and were looking to ensure that she had a voice in her goals of care and that she had the proper documentation for decision makers. She stated \"that all sounds nice but is it true? \"  This was stated by her several times during our conversation. Patient believes she has an AMD on file but our team was not able to locate one. She told us that she would want her brother Tiffanie Wing and her Sister Smooth Bingham to be her decision makers. We will attempt to go back tomorrow and get her to sign an advanced medical directive. Patient does appear very suspicious and has difficulty trusting what people come in to tell her.   She stated \"people keep coming in here all the time telling me things that are not true\"  Goals of care: Patient is full code with full interventions  2. 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 Patient is apparently scheduled with neurosurgery Dr. Kelly Joel  3. Breast cancer  Ductal carcinoma of breast is followed by Dr. Calderon Johnson seen in his clinic last month. Patient's when asked about her oncologist stated she sees him once a year for a mammogram.  4.  Wounds  Seen by wound care nurse noted large wounds right upper back unstageable pressure with 95% eschar and slough, also on lumbar back, left buttocks, right buttocks, and left pretibial blister with a large left dorsal foot intact serous blister. This would indicate some level of debility and that patient has been sitting for long periods of time for pressure wounds to develop. 5.  Debility  Patient appears to have a palliative performance score of around 50% she has mainly chair to bed existence unable to do any work due to extensive disease progression. She is going to need a considerable amount of assistance for her functional ADLs and self-care moving forward she has a normal intake it appears of nutrition however she has protein malnutrition with an albumin of 2.6 today and 2.3 on arrival which would indicate longer-term lower level of protein intake. Have concerns for her independence and ability to care for herself at home at this time. Care manager is working on skilled nursing placement. 6.  Initial consult note routed to primary continuity provider  7.   Communicated plan of care with: Palliative IDT      Advance Care Planning:  [] The [facility] Interdisciplinary Team has updated the ACP Navigator with [address] and Patient Capacity    Primary Decision Maker ([address]): Needs an AMD     Medical Interventions: Full interventions   Other Instructions:         As far as possible, the palliative care team has discussed with patient / health care proxy about goals of care / treatment preferences for patient. HISTORY:     History obtained from: Chart review   Principal Problem:    Severe sepsis ([address] (8/1/2022)    Active Problems:    History of breast cancer (5/16/2016)      Open wound of lower back (8/1/2022)      Wound of left leg (8/1/2022)      Glaucoma (8/1/2022)      Primary hypertension (8/1/2022)      Acquired hypothyroidism (8/1/2022)      Obesity (BMI 30.0-34.9) (8/1/2022)      Overview: BMI 30.70 on 8/01/2022      Impaired mobility and ADLs (8/1/2022)      Overview: Patient is mobile with use of Rolator and has significant debility. 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    Cancer ([address] 01/01/2014    breast    Ductal carcinoma in situ of breast     Functional quadriplegia ([address]     as of 7/2022    Glaucoma     History of seizure disorder     Open back wound 07/2022    Pressure wound for lying on a table for indeterminate amount of time    Pituitary adenoma with extrasellar extension ([address]     Primary hypertension       Past Surgical History:   Procedure Laterality Date    HX HYSTERECTOMY      partial    HX MASTECTOMY  4/30/2014    RIGHT PARTIAL MASTECTOMY WITH NEEDLE LOCALIZATION MAMMOGRAM SENTINEL LYMPH NODE BIOPSY performed by Michael Dasilva MD at [facility] MAIN OR      Family History   Problem Relation Age of Onset    Colon Cancer Mother      History reviewed, no pertinent family history.   Social History     Tobacco Use    Smoking status: Never    Smokeless tobacco: Never   Substance Use Topics    Alcohol use: No     Alcohol/week: 0.0 standard drinks     No Known Allergies   Current Facility-Administered Medications   Medication Dose Route Frequency    meropenem (MERREM) 1 g in 0.9% sodium chloride (MBP/ADV) 50 mL MBP  1 g IntraVENous Q8H    collagenase (SANTYL) 250 unit/gram ointment   Topical BID    sodium hypochlorite (QUARTER STRENGTH DAKIN'S) 0.125% irrigation (bottle)   Topical BID    melatonin (rapid dissolve) tablet 5 mg  5 mg Oral QHS PRN    sodium chloride (NS) flush 5-40 mL  5-40 mL IntraVENous Q8H    sodium chloride (NS) flush 5-40 mL  5-40 mL IntraVENous PRN    acetaminophen (TYLENOL) tablet 650 mg  650 mg Oral Q6H PRN    Or    acetaminophen (TYLENOL) suppository 650 mg  650 mg Rectal Q6H PRN    polyethylene glycol (MIRALAX) packet 17 g  17 g Oral DAILY PRN    ondansetron (ZOFRAN ODT) tablet 4 mg  4 mg Oral Q8H PRN    Or    ondansetron (ZOFRAN) injection 4 mg  4 mg IntraVENous Q6H PRN    albuterol-ipratropium (DUO-NEB) 2.5 MG-0.5 MG/3 ML  3 mL Nebulization Q6H PRN    nitroglycerin (NITROBID) 2 % ointment 0.5 Inch  0.5 Inch Topical Q6H PRN    naloxone (NARCAN) injection 0.4 mg  0.4 mg IntraVENous EVERY 2 MINUTES AS NEEDED    amLODIPine (NORVASC) tablet 10 mg  10 mg Oral DAILY    cyanocobalamin (VITAMIN B12) sublingual tablet 2,500 mcg  2,500 mcg Oral DAILY    dexAMETHasone (DECADRON) tablet 2 mg  2 mg Oral Q12H    divalproex ER (DEPAKOTE ER) 24 hour tablet 500 mg  500 mg Oral DAILY    gabapentin (NEURONTIN) capsule 300 mg  300 mg Oral BID    hydrALAZINE (APRESOLINE) tablet 25 mg  25 mg Oral TID    levothyroxine (SYNTHROID) tablet 88 mcg  88 mcg Oral 6am    atenoloL (TENORMIN) tablet 100 mg  100 mg Oral DAILY    oxyCODONE-acetaminophen (PERCOCET) 5-325 mg per tablet 1 Tablet  1 Tablet Oral Q4H PRN    pyridoxine (vitamin B6) (VITAMIN B-6) tablet 200 mg  200 mg Oral DAILY          Clinical Pain Assessment (nonverbal scale for nonverbal patients): Clinical Pain Assessment  Severity: 3          Duration: for how long has pt been experiencing pain (e.g., 2 days, 1 month, years)  Frequency: how often pain is an issue (e.g., several times per day, once every few days, constant)     FUNCTIONAL ASSESSMENT:     Palliative Performance Scale (PPS):  PPS: 50    ECOG  ECOG Status : Ambulatory, but unable to carry out work activities     PSYCHOSOCIAL/SPIRITUAL SCREENING:      Any spiritual / Cheondoism concerns:  [] Yes /  [x] No    Caregiver Burnout:  [] Yes /  [] No /  [x] No Caregiver Present      Anticipatory grief assessment:   [x] Normal  / [] Maladaptive        REVIEW OF SYSTEMS:     Systems: constitutional, ears/nose/mouth/throat, respiratory, gastrointestinal, genitourinary, musculoskeletal, integumentary, neurologic, psychiatric, endocrine. Positive findings noted below. Modified ESAS Completed by: provider           Pain: 3           Dyspnea: 0           Stool Occurrence(s): 1   Positive and pertinent negative findings in ROS are noted above in HPI. The following systems were [x] reviewed / [] unable to be reviewed as noted in HPI  Other findings are noted below. 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 ENMT: no nasal discharge, moist mucous membranes  Cardiovascular: regular rhythm, distal pulses intact  Respiratory: breathing not labored, symmetric  Gastrointestinal: soft non-tender, +bowel sounds  Last bowel movement:   Musculoskeletal: no deformity, no tenderness to palpation  Skin: warm, dry  Neurologic: following commands, moving all extremities  Psychiatric: full affect, no hallucinations    Other: Wt Readings from Last 3 Encounters:   08/09/22 83.1 kg (183 lb 3.2 oz)   06/26/19 89.6 kg (197 lb 9.6 oz)   05/22/19 89.5 kg (197 lb 6.4 oz)     Blood pressure 118/67, pulse 66, temperature 97.6 °F (36.4 °C), resp. rate 16, height 5' 3\" (1.6 m), weight 83.1 kg (183 lb 3.2 oz), SpO2 98 %.   Pain:  Pain Scale 1: Numeric (0 - 10)  Pain Intensity 1: 2     Pain Location 1: Generalized  Pain Orientation 1: Left  Pain Description 1: Other (comment) (prophylactic dose for dressing change)  Pain Intervention(s) 1: Repositioned       LAB AND IMAGING FINDINGS:     Lab Results   Component Value Date/Time    WBC 8.5 08/10/2022 02:33 AM    HGB 11.5 (L) 08/10/2022 02:33 AM    PLATELET 194 53/31/5158 02:33 AM     Lab Results   Component Value Date/Time    Sodium 139 08/10/2022 02:33 AM    Potassium 4.8 08/10/2022 02:33 AM    Chloride 105 08/10/2022 02:33 AM    CO2 30 08/10/2022 02:33 AM    BUN 36 (H) 08/10/2022 02:33 AM    Creatinine 0.93 08/10/2022 02:33 AM    Calcium 9.3 08/10/2022 02:33 AM    Magnesium 2.2 08/01/2022 02:14 PM      Lab Results   Component Value Date/Time    Alk. phosphatase 99 08/10/2022 02:33 AM    Protein, total 6.3 (L) 08/10/2022 02:33 AM    Albumin 2.4 (L) 08/10/2022 02:33 AM    Globulin 3.9 08/10/2022 02:33 AM     Lab Results   Component Value Date/Time    INR 1.1 08/02/2022 08:21 AM    Prothrombin time 14.5 08/02/2022 08:21 AM      No results found for: IRON, FE, TIBC, IBCT, PSAT, FERR   No results found for: PH, PCO2, PO2  No components found for: [place]   Lab Results   Component Value Date/Time     (H) 08/01/2022 02:14 PM              Total time: 25 minutes   Counseling / coordination time, spent as noted above:   > 50% counseling / coordination:  Time spent in direct consultation with the patient, medical team, and family     Prolonged service was provided for  []30 min   []75 min in face to face time in the presence of the patient, spent as noted above. Time Start:   Time End:     Disclaimer: Sections of this note are dictated using utilizing voice recognition software, which may have resulted in some phonetic based errors in grammar and contents. Even though attempts were made to correct all the mistakes, some may have been missed, and remained in the body of the document. If questions arise, please contact our department.

## 2022-08-11 ENCOUNTER — APPOINTMENT (OUTPATIENT)
Dept: INTERVENTIONAL RADIOLOGY/VASCULAR | Age: 67
DRG: 853 | End: 2022-08-11
Attending: INTERNAL MEDICINE
Payer: MEDICARE

## 2022-08-11 LAB — VALPROATE SERPL-MCNC: 7 UG/ML (ref 50–100)

## 2022-08-11 PROCEDURE — C1751 CATH, INF, PER/CENT/MIDLINE: HCPCS

## 2022-08-11 PROCEDURE — 77030038269 HC DRN EXT URIN PURWCK BARD -A

## 2022-08-11 PROCEDURE — 97116 GAIT TRAINING THERAPY: CPT

## 2022-08-11 PROCEDURE — 02HV33Z INSERTION OF INFUSION DEVICE INTO SUPERIOR VENA CAVA, PERCUTANEOUS APPROACH: ICD-10-PCS | Performed by: PHYSICIAN ASSISTANT

## 2022-08-11 PROCEDURE — 77030040393 HC DRSG OPTIFOAM GENT MDII -B

## 2022-08-11 PROCEDURE — 97535 SELF CARE MNGMENT TRAINING: CPT

## 2022-08-11 PROCEDURE — 65270000046 HC RM TELEMETRY

## 2022-08-11 PROCEDURE — 97530 THERAPEUTIC ACTIVITIES: CPT

## 2022-08-11 PROCEDURE — 74011250636 HC RX REV CODE- 250/636: Performed by: INTERNAL MEDICINE

## 2022-08-11 PROCEDURE — 77030040392 HC DRSG OPTIFOAM MDII -A

## 2022-08-11 PROCEDURE — 77030041076 HC DRSG AG OPTICELL MDII -A

## 2022-08-11 PROCEDURE — 74011000250 HC RX REV CODE- 250: Performed by: INTERNAL MEDICINE

## 2022-08-11 PROCEDURE — 74011250637 HC RX REV CODE- 250/637: Performed by: INTERNAL MEDICINE

## 2022-08-11 PROCEDURE — 80164 ASSAY DIPROPYLACETIC ACD TOT: CPT

## 2022-08-11 PROCEDURE — 99233 SBSQ HOSP IP/OBS HIGH 50: CPT | Performed by: STUDENT IN AN ORGANIZED HEALTH CARE EDUCATION/TRAINING PROGRAM

## 2022-08-11 PROCEDURE — 99232 SBSQ HOSP IP/OBS MODERATE 35: CPT | Performed by: INTERNAL MEDICINE

## 2022-08-11 PROCEDURE — 74011000258 HC RX REV CODE- 258: Performed by: INTERNAL MEDICINE

## 2022-08-11 PROCEDURE — 97168 OT RE-EVAL EST PLAN CARE: CPT

## 2022-08-11 PROCEDURE — 2709999900 HC NON-CHARGEABLE SUPPLY

## 2022-08-11 PROCEDURE — 36415 COLL VENOUS BLD VENIPUNCTURE: CPT

## 2022-08-11 RX ORDER — DIVALPROEX SODIUM 500 MG/1
500 TABLET, EXTENDED RELEASE ORAL 2 TIMES DAILY
Status: DISCONTINUED | OUTPATIENT
Start: 2022-08-11 | End: 2022-08-17 | Stop reason: HOSPADM

## 2022-08-11 RX ADMIN — SODIUM CHLORIDE, PRESERVATIVE FREE 10 ML: 5 INJECTION INTRAVENOUS at 14:53

## 2022-08-11 RX ADMIN — MEROPENEM 1 G: 1 INJECTION INTRAVENOUS at 21:51

## 2022-08-11 RX ADMIN — DEXAMETHASONE 2 MG: 2 TABLET ORAL at 21:56

## 2022-08-11 RX ADMIN — LEVOTHYROXINE SODIUM 88 MCG: 88 TABLET ORAL at 06:29

## 2022-08-11 RX ADMIN — PYRIDOXINE HCL TAB 50 MG 200 MG: 50 TAB at 08:21

## 2022-08-11 RX ADMIN — Medication 2500 MCG: at 08:18

## 2022-08-11 RX ADMIN — DIVALPROEX SODIUM 500 MG: 500 TABLET, EXTENDED RELEASE ORAL at 08:25

## 2022-08-11 RX ADMIN — ATENOLOL 100 MG: 50 TABLET ORAL at 08:20

## 2022-08-11 RX ADMIN — HYDRALAZINE HYDROCHLORIDE 25 MG: 50 TABLET, FILM COATED ORAL at 18:08

## 2022-08-11 RX ADMIN — GABAPENTIN 300 MG: 300 CAPSULE ORAL at 08:19

## 2022-08-11 RX ADMIN — DAKIN'S SOLUTION 0.125% (QUARTER STRENGTH): 0.12 SOLUTION at 08:26

## 2022-08-11 RX ADMIN — HYDRALAZINE HYDROCHLORIDE 25 MG: 50 TABLET, FILM COATED ORAL at 21:55

## 2022-08-11 RX ADMIN — COLLAGENASE SANTYL: 250 OINTMENT TOPICAL at 08:26

## 2022-08-11 RX ADMIN — MEROPENEM 1 G: 1 INJECTION INTRAVENOUS at 14:53

## 2022-08-11 RX ADMIN — GABAPENTIN 300 MG: 300 CAPSULE ORAL at 18:08

## 2022-08-11 RX ADMIN — DIVALPROEX SODIUM 500 MG: 500 TABLET, EXTENDED RELEASE ORAL at 18:08

## 2022-08-11 RX ADMIN — DEXAMETHASONE 2 MG: 2 TABLET ORAL at 08:18

## 2022-08-11 RX ADMIN — DAKIN'S SOLUTION 0.125% (QUARTER STRENGTH) 1 ML: 0.12 SOLUTION at 22:13

## 2022-08-11 RX ADMIN — SODIUM CHLORIDE, PRESERVATIVE FREE 10 ML: 5 INJECTION INTRAVENOUS at 22:13

## 2022-08-11 RX ADMIN — COLLAGENASE SANTYL 250 TUBE: 250 OINTMENT TOPICAL at 22:14

## 2022-08-11 NOTE — PROGRESS NOTES
Re:  Klarissa Louis up visit     8/11/2022 3:54 PM    SSN: [id number]    Subjective:   Dillon Marin was seen on follow-up. A 74years old female patient admitted for sepsis secondary to left foot ulcer. She is currently on Meropenem. Neurology consulted for her seizure medication. Patient has been having longstanding seizures which are well controlled. She says that she is not taking any medications for seizures for a few years. But from her follow-up note at the outside hospital neurology, it seems that she is taking Depakote 500 mg p.o. per day. In addition, she has gabapentin 300 mg pO BID. She is following with neurosurgery for left frontal lobe infiltrative mass(DDX of GBM vs lymphoma) and pituitary macroadenoma.          Medications:    Current Facility-Administered Medications   Medication Dose Route Frequency Provider Last Rate Last Admin    meropenem (MERREM) 1 g in 0.9% sodium chloride (MBP/ADV) 50 mL MBP  1 g IntraVENous Q8H Alicja Xie MD 16.7 mL/hr at 08/11/22 1453 1 g at 08/11/22 1453    collagenase (SANTYL) 250 unit/gram ointment   Topical BID Ivy Sames, DO   Given at 08/11/22 0826    sodium hypochlorite (QUARTER STRENGTH DAKIN'S) 0.125% irrigation (bottle)   Topical BID Ivy Sames, DO   Given at 08/11/22 0826    melatonin (rapid dissolve) tablet 5 mg  5 mg Oral QHS PRN Valarie Gandara MD   5 mg at 08/10/22 2155    sodium chloride (NS) flush 5-40 mL  5-40 mL IntraVENous Q8H Willam Ramos, DO   10 mL at 08/11/22 1453    sodium chloride (NS) flush 5-40 mL  5-40 mL IntraVENous PRN Kristopher Raza DO        acetaminophen (TYLENOL) tablet 650 mg  650 mg Oral Q6H PRN Grayson BRENNAN, DO   650 mg at 08/06/22 0920    Or    acetaminophen (TYLENOL) suppository 650 mg  650 mg Rectal Q6H PRN Willam Ramos, DO        polyethylene glycol (MIRALAX) packet 17 g  17 g Oral DAILY PRN Grayson BRENNAN, DO   17 g at 08/09/22 1202    ondansetron (ZOFRAN ODT) tablet 4 mg  4 mg Oral Q8H PRN Zeus BRENNAN,         Or    ondansetron (ZOFRAN) injection 4 mg  4 mg IntraVENous Q6H PRN Willam Ramos DO        albuterol-ipratropium (DUO-NEB) 2.5 MG-0.5 MG/3 ML  3 mL Nebulization Q6H PRN Willam Ramos,         nitroglycerin (NITROBID) 2 % ointment 0.5 Inch  0.5 Inch Topical Q6H PRN Willam Ramos DO        naloxone (NARCAN) injection 0.4 mg  0.4 mg IntraVENous EVERY 2 MINUTES AS NEEDED Willam Ramos,         amLODIPine (NORVASC) tablet 10 mg  10 mg Oral DAILY Willam Ramos, DO   10 mg at 08/10/22 2465    cyanocobalamin (VITAMIN B12) sublingual tablet 2,500 mcg  2,500 mcg Oral DAILY Duard Record, DO   2,500 mcg at 08/11/22 0818    dexAMETHasone (DECADRON) tablet 2 mg  2 mg Oral Q12H Willam Ramos, DO   2 mg at 08/11/22 0818    divalproex ER (DEPAKOTE ER) 24 hour tablet 500 mg  500 mg Oral DAILY Willam Ramos, DO   500 mg at 08/11/22 0825    gabapentin (NEURONTIN) capsule 300 mg  300 mg Oral BID Duard Record, DO   300 mg at 08/11/22 2775    hydrALAZINE (APRESOLINE) tablet 25 mg  25 mg Oral TID Duard Record, DO   25 mg at 08/10/22 2155    levothyroxine (SYNTHROID) tablet 88 mcg  88 mcg Oral Jonah Lopez, DO   88 mcg at 08/11/22 0029    atenoloL (TENORMIN) tablet 100 mg  100 mg Oral DAILY Duard Record, DO   100 mg at 08/11/22 0820    oxyCODONE-acetaminophen (PERCOCET) 5-325 mg per tablet 1 Tablet  1 Tablet Oral Q4H PRN Duard Record, DO   1 Tablet at 08/10/22 1426    pyridoxine (vitamin B6) (VITAMIN B-6) tablet 200 mg  200 mg Oral DAILY Willam Ramos, DO   200 mg at 08/11/22 9504       Vital signs:  Visit Vitals  /75 (BP 1 Location: Right arm, BP Patient Position: At rest)   Pulse 64   Temp 98 °F (36.7 °C)   Resp 16   Ht 5' 3\" (1.6 m)   Wt 83.1 kg (183 lb 3.2 oz)   SpO2 99%   BMI 32.45 kg/m²         Patient Active Problem List   Diagnosis Code    History of breast cancer Z85.3    Severe obesity (HCC) E66.01    Severe sepsis (HCC) A41.9, R65.20    Open wound of lower back S31.000A    Wound of left leg S81.802A    Glaucoma H40.9    Primary hypertension I10    Acquired hypothyroidism E03.9    Obesity (BMI 30.0-34. 9) E66.9    Impaired mobility and ADLs Z74.09, Z78.9         Objective:   Extremities: Slightly swollen left leg with clean dressing over the distal aspect. Slight tenderness. Abdomen: soft and non tender  Mental status: Awake, alert, oriented x3, follows simple, complex and inverted commands, no neglect, no extinction . Speech and languge: fluent, coherent,  and comprehension intact  CN: VFF on the left side; opaqueright eye; EOMI, PERRLA, face sensation intact , no facial asymmetry noted, palate elevation symmetric bilat, SS+SCM 5/5 bilat, tongue midline  Motor: no pronator drift, tone normal throughout; moves both sides symmetrically. Sensory: intact to light touch throughout        CBC:   Lab Results   Component Value Date/Time    WBC 8.5 08/10/2022 02:33 AM    RBC 3.83 (L) 08/10/2022 02:33 AM    HGB 11.5 (L) 08/10/2022 02:33 AM    HCT 35.1 08/10/2022 02:33 AM    PLATELET 180 05/49/5423 02:33 AM     BMP:   Lab Results   Component Value Date/Time    Glucose 101 (H) 08/10/2022 02:33 AM    Sodium 139 08/10/2022 02:33 AM    Potassium 4.8 08/10/2022 02:33 AM    Chloride 105 08/10/2022 02:33 AM    CO2 30 08/10/2022 02:33 AM    BUN 36 (H) 08/10/2022 02:33 AM    Creatinine 0.93 08/10/2022 02:33 AM    Calcium 9.3 08/10/2022 02:33 AM     CMP:   Lab Results   Component Value Date/Time    Glucose 101 (H) 08/10/2022 02:33 AM    Sodium 139 08/10/2022 02:33 AM    Potassium 4.8 08/10/2022 02:33 AM    Chloride 105 08/10/2022 02:33 AM    CO2 30 08/10/2022 02:33 AM    BUN 36 (H) 08/10/2022 02:33 AM    Creatinine 0.93 08/10/2022 02:33 AM    Calcium 9.3 08/10/2022 02:33 AM    Anion gap 4 08/10/2022 02:33 AM    BUN/Creatinine ratio 39 (H) 08/10/2022 02:33 AM    Alk.  phosphatase 99 08/10/2022 02:33 AM    Protein, total 6.3 (L) 08/10/2022 02:33 AM Albumin 2.4 (L) 08/10/2022 02:33 AM    Globulin 3.9 08/10/2022 02:33 AM    A-G Ratio 0.6 (L) 08/10/2022 02:33 AM       Assessment/plan:  A 73 yo female patient with medical medical histories including longstanding seizure disorder on Depakote 500 mg p.o. daily [also has gabapentin 300 mg p.o. twice daily possibly for pain] and right frontal lobe mass which is infiltrative [GBM versus lymphoma] admitted to this hospital for sepsis from foot ulcer. She is started on meropenem. Neurology consulted for her seizure medication. Valproic acid level is 7. Since patient is on meropenem which decreases the pulmonary level significantly, need to increase the dose of Depakote temporarily to 500 mg p.o. twice daily if there is no other alternative medication for the meropenem. She can go back to her previous dose once his antibiotic is discontinued. She needs to follow-up with her outpatient neurologist and also neurosurgery. Call for questions. Sincerely,      Adan Ely M.D. PLEASE NOTE:   This document has been produced using voice recognition software. Unrecognized errors in transcription may be present.

## 2022-08-11 NOTE — PROGRESS NOTES
Problem: Mobility Impaired (Adult and Pediatric)  Goal: *Acute Goals and Plan of Care (Insert Text)  Description: Physical Therapy Goals  Initiated 8/2/2022, re-evaluated 8/9/22 and goals adjusted and to be accomplished within 7 day(s)  1. Patient will move from supine to sit and sit to supine , scoot up and down, and roll side to side in bed with minimal assistance/contact guard assist.    2.  Patient will transfer from bed to chair and chair to bed with SBA using the least restrictive device. 3.  Patient will perform sit to stand with CGA  4. Patient will ambulate with minimal assistance/contact guard assist for 25 feet with the least restrictive device. 5.  Patient will ascend/descend stairs  as needed. 6.  Patient will perform BLE therex as prescribed    PLOF: Pt lives alone, reports she has a rollator, receives some assist from brothers      Outcome: Progressing Towards Goal   PHYSICAL THERAPY TREATMENT    Patient: Dustin Remy (77 y.o. female)  Date: 8/11/2022  Diagnosis: Sepsis ([facility] [address] [A41.9]  Leukocytosis [D72.829]  Open wound of lower back [S31.000A] Severe sepsis ([address]      Precautions: Fall, Skin      ASSESSMENT:  Pt able to make functional progression this visit with PT and OT co-tx to maximize safety and mobility. Pt cont to req frequent cueing to maintain on task and also req frequent reassurance to motivation as pt's confidence with mobility is impaired. With additional time, pt was able to display improvements in her mobility, amb from her bed to the bathroom and back today. Pt displayed most difficulty in sit <> stands from lower height surfaces such as the low bed height and low toilet height. This was done to mimic home environment. With lower surfaces, pt req mod A X2, however with more elevated surfaces, less assistance was req. Pt frequently cueing when amb to bathroom to increase step height and length, to improve efficiency and safety, fair carryover displayed.  Pt req increased time in education on benefits of mobility and regaining independence as pt seems to display impaired insight to importance of self care and independence with mobility to improve overall health. Pt left in nursing care in supine with all needs in reach. Progression toward goals: good   [x]      Improving appropriately and progressing toward goals  []      Improving slowly and progressing toward goals  []      Not making progress toward goals and plan of care will be adjusted     PLAN:  Patient continues to benefit from skilled intervention to address the above impairments. Continue treatment per established plan of care. Further Equipment Recommendations for Discharge:  bedside commode, transfer bench, rolling walker, and N/A    AMPAC: Based on an AM-PAC score of 13/24 (12/20 if omitting stairs) and their current functional mobility deficits, it is recommended that the patient have 3-5 sessions per week of Physical Therapy at d/c to increase the patient's independence. This AMPAC score should be considered in conjunction with interdisciplinary team recommendations to determine the most appropriate discharge setting. Patient's social support, diagnosis, medical stability, and prior level of function should also be taken into consideration. SUBJECTIVE:   Patient stated Can I use that [address] at home?     OBJECTIVE DATA SUMMARY:   Critical Behavior:  Neurologic State: Alert  Orientation Level: Oriented X4  Cognition: Follows commands  Safety/Judgement: Fall prevention  Functional Mobility Training:  Bed Mobility:  Supine to Sit: Moderate assistance  Sit to Supine: Moderate assistance  Scooting: Minimum assistance  Transfers:  Sit to Stand: Moderate assistance (from low toilet)  Stand to Sit: Moderate assistance (from low toilet)  Balance:  Sitting: Impaired; With support  Sitting - Static: Good (unsupported)  Sitting - Dynamic: Fair (occasional) (+)  Standing: Impaired; With support  Standing - Static: Fair  Standing - Dynamic : Fair  Ambulation/Gait Training:  Distance (ft): 15 Feet (ft) (X2)  Assistive Device: Walker, rolling  Ambulation - Level of Assistance: Minimal assistance;Assist x2  Gait Abnormalities: Decreased step clearance  Base of Support: Center of gravity altered;Narrowed  Speed/[place]: Slow;Shuffled  Step Length: Right shortened;Left shortened      Pain:  Pain level pre-treatment: 0/10  Pain level post-treatment: 0/10       Activity Tolerance:   Good   Please refer to the flowsheet for vital signs taken during this treatment. After treatment:   [] Patient left in no apparent distress sitting up in chair  [x] Patient left in no apparent distress in bed  [x] Call bell left within reach  [x] Nursing notified  [x] Caregiver present  [] Bed alarm activated  [] SCDs applied      COMMUNICATION/EDUCATION:   [x]         Role of Physical Therapy in the acute care setting. [x]         Fall prevention education was provided and the patient/caregiver indicated understanding. [x]         Patient/family have participated as able in working toward goals and plan of care. [x]         Patient/family agree to work toward stated goals and plan of care. []         Patient understands intent and goals of therapy, but is neutral about his/her participation. []         Patient is unable to participate in stated goals/plan of care: ongoing with therapy staff.  []         Other:        [place], [facility]   Time Calculation: 60 mins    Laurie Lester AM-PAC® Basic Mobility Inpatient Short Form (6-Clicks) Version 2    How much HELP from another person does the patient currently need    (If the patient hasn't done an activity recently, how much help from another person do you think he/she would need if he/she tried?)   Total (Total A or Dep)   A Lot  (Mod to Max A)   A Little (Sup or Min A)   None (Mod I to I)   Turning from your back to your side while in a flat bed without using bedrails?    [] 1 [] 2 [x] 3 [] 4   2. Moving from lying on your back to sitting on the side of a flat bed without using bedrails? [] 1 [x] 2 [] 3 [] 4   3. Moving to and from a bed to a chair (including a wheelchair)? [] 1 [x] 2 [] 3 [] 4   4. Standing up from a chair using your arms (e.g., wheelchair, or bedside chair)? [] 1 [x] 2 [] 3 [] 4   5. Walking in hospital room? [] 1 [] 2 [x] 3 [] 4   6. Climbing 3-5 steps with a railing?+   [x] 1 [] 2 [] 3 [] 4   +If stair climbing cannot be assessed, skip item #6. Sum responses from items 1-5. Based on an AM-PAC score of 13/24 (12/20 if omitting stairs) and their current functional mobility deficits, it is recommended that the patient have 3-5 sessions per week of Physical Therapy at d/c to increase the patient's independence.

## 2022-08-11 NOTE — ROUTINE PROCESS
0720- Bedside, Verbal and Written shift change report received by Kassie Ibrahim (oncoming nurse) by Asia(offgoing nurse). Report included the following information SBAR, Kardex, Intake/Output, MAR and Recent Results. 0820-Assessment completed, call bell within reach, no distress noted, voiced no complaints at this time. AM  medications administered, pt tolerated with ease, will continue to monitor. 1000-dressings completed to all wounds  1200 -- Shift reassessment, pt condition unchanged, will continue to monitor. 1600-  Shift reassessment, pt condition unchanged, will continue to monitor. 1915-- Bedside, Verbal and Written shift change report given to Rossana(oncoming nurse) by Kassie Ibrahim (offgoing nurse). Report included the following information SBAR, Kardex, Intake/Output, MAR and Recent Results. Skin assessment completed.

## 2022-08-11 NOTE — PROGRESS NOTES
The CM spoke with the patient and explained to the patient of the concerns of the care-team as the recommendation is LTC. The CM posed the suggestion of having a PCA assist the patient in their home to aid in their ADL. The CM informed that the patient would have to pay for PCA service as their Medicare would not pay for PCA service. The patient stated \"How much would it cost?\", and the CM informed that  home-health agencies will be contacted by the CM to inquire about cost for PCA services.     ALONA Luz, HP

## 2022-08-11 NOTE — PROGRESS NOTES
[facility]  Progress Note    Patient: Minerva An Age: 74 y.o. : 1955 MR#: [id number] SSN: [id number]  Date/Time: 2022     Subjective:   Pt has no complaints. Declined my evaluation stating she has been evaluated by another doctor already. Assessment/Plan:   74year old female admitted after presenting to [facility] ED w/ c/o left foot blister and left ankle pain. Also had findings of back wound that was malodorous and was admitted for management of severe sepsis secondary to lower back wound and draining left ankle blister. Sepsis 2y to Left foot blister, left ankle pain with chronic wounds on her lower back. CT abdomen and pelvis soft tissue stranding and ill-defined fluid collection along the right paralumbar region. S/p I&D with juanjose pus, necrotic muscle skin and subcutaneous tissue to bone. Continues broad-spectrum IV antibiotics, currently on Meropenem    Concern raised by ID specialist regarding interaction between Meropenem and Depakote. Discussed w/ pharmacist, level of Depakote might be lower in setting of concomitant Meropenem use, and increase risk of seizure episode. Neurology consulted for guidance on AED choice. Discussed w/ neurologist, Arvind Many made to double dose of depakote while on Meropenem. Appreciate assistance. Discussed w/ ID, okay at this time for PICC line insertion as last blood cx is negative. PICC line team unable to insert line, PLAN to consult IR for PICC line placement. Bacteremia with wound cultures growing Klebsiella and Proteus mirabilis- continues on IV antibiotics, will defer duration and choice of ABx to infectious disease. Brain mass-patient currently has a neurosurgical appointment, was supposed to undergo biopsy for possible glioblastoma however it is currently pending. Continue dexamethasone 2 mg twice daily. Per patient's brothers she has been given timeframe of approximately 9 months to live.   History of hypertension-resume home medications, monitor blood pressure  History of hypothyroidism-continue Synthroid  History of seizure disorder-continue gabapentin will double dose of Depakote, please see above. History of breast cancer-continue tamoxifen  DVT prophylaxis-Lovenox  Full code        DISPO: discussed during IDR. Pt at this point has declined SNF and wants to go home, however, home health for home IV ABx via PICC line not possible, per CM team. Per psych consultant, pt able to make medical decisions at this time.  Will cont to work w/ CM for safe d/c.      Sandra Colvin MD  22      Case discussed with:  [x]Patient  []Family  [x]Nursing  [x]Case Management  DVT Prophylaxis:  [x]Lovenox  []Hep SQ  []SCDs  []Coumadin   []On Heparin gtt    Objective:   VS: Visit Vitals  /75 (BP 1 Location: Right arm, BP Patient Position: At rest)   Pulse 64   Temp 98 °F (36.7 °C)   Resp 16   Ht 5' 3\" (1.6 m)   Wt 83.1 kg (183 lb 3.2 oz)   SpO2 99%   BMI 32.45 kg/m²        Tmax/24hrs: Temp (24hrs), Av.2 °F (36.8 °C), Min:97.9 °F (36.6 °C), Max:98.6 °F (37 °C)  IOBRIEF  Intake/Output Summary (Last 24 hours) at 2022 1553  Last data filed at 2022 1057  Gross per 24 hour   Intake 240 ml   Output 1200 ml   Net -960 ml       General:  Alert, non cooperative, no acute distress, blind in right eye  Declined exam    Medications:   Current Facility-Administered Medications   Medication Dose Route Frequency    meropenem (MERREM) 1 g in 0.9% sodium chloride (MBP/ADV) 50 mL MBP  1 g IntraVENous Q8H    collagenase (SANTYL) 250 unit/gram ointment   Topical BID    sodium hypochlorite (QUARTER STRENGTH DAKIN'S) 0.125% irrigation (bottle)   Topical BID    melatonin (rapid dissolve) tablet 5 mg  5 mg Oral QHS PRN    sodium chloride (NS) flush 5-40 mL  5-40 mL IntraVENous Q8H    sodium chloride (NS) flush 5-40 mL  5-40 mL IntraVENous PRN    acetaminophen (TYLENOL) tablet 650 mg  650 mg Oral Q6H PRN    Or    acetaminophen (TYLENOL) suppository 650 mg  650 mg Rectal Q6H PRN    polyethylene glycol (MIRALAX) packet 17 g  17 g Oral DAILY PRN    ondansetron (ZOFRAN ODT) tablet 4 mg  4 mg Oral Q8H PRN    Or    ondansetron (ZOFRAN) injection 4 mg  4 mg IntraVENous Q6H PRN    albuterol-ipratropium (DUO-NEB) 2.5 MG-0.5 MG/3 ML  3 mL Nebulization Q6H PRN    nitroglycerin (NITROBID) 2 % ointment 0.5 Inch  0.5 Inch Topical Q6H PRN    naloxone (NARCAN) injection 0.4 mg  0.4 mg IntraVENous EVERY 2 MINUTES AS NEEDED    amLODIPine (NORVASC) tablet 10 mg  10 mg Oral DAILY    cyanocobalamin (VITAMIN B12) sublingual tablet 2,500 mcg  2,500 mcg Oral DAILY    dexAMETHasone (DECADRON) tablet 2 mg  2 mg Oral Q12H    divalproex ER (DEPAKOTE ER) 24 hour tablet 500 mg  500 mg Oral DAILY    gabapentin (NEURONTIN) capsule 300 mg  300 mg Oral BID    hydrALAZINE (APRESOLINE) tablet 25 mg  25 mg Oral TID    levothyroxine (SYNTHROID) tablet 88 mcg  88 mcg Oral 6am    atenoloL (TENORMIN) tablet 100 mg  100 mg Oral DAILY    oxyCODONE-acetaminophen (PERCOCET) 5-325 mg per tablet 1 Tablet  1 Tablet Oral Q4H PRN    pyridoxine (vitamin B6) (VITAMIN B-6) tablet 200 mg  200 mg Oral DAILY       Imaging:   XR Results (most recent):  Results from Hospital Encounter encounter on 08/01/22    XR CHEST PORT    Narrative  EXAM: XR CHEST PORT    CLINICAL INDICATION/HISTORY: 74 years Female. chest pain. Additional History: None    TECHNIQUE: Frontal view of the chest    COMPARISON: Chest radiograph 3/2/2020    FINDINGS:    The cardiac silhouette appears within normal limits. Tortuosity of the thoracic  aorta, unchanged in appearance. Pulmonary vasculature appears within normal  limits. No confluent airspace opacity is appreciated. No definite evidence of  pleural effusion or pneumothorax. No acute osseous abnormality appreciated. Impression  No radiographic evidence of acute cardiopulmonary process.        CT Results (most recent):  [place] [facility] [place]    CT ABD PELV W CONT    Narrative  CT Abdomen And Pelvis with Intravenous Contrast    INDICATION: Lumbar soft tissue abscess. TECHNIQUE: 5 mm collimation axial images obtained from the diaphragm to the  level of the pubic symphysis following the uneventful administration of  100 cc  of low osmolar, nonionic intravenous contrast.    Dose reduction techniques used: Automated exposure control, adjustment of the  mAs and/or kVp according to patient size, standardized low-dose protocol, and/or  iterative reconstruction technique. COMPARISON: None. ABDOMEN FINDINGS:    Lung Bases: Bibasilar atelectasis. Small pleural effusions. Liver: Normal attenuation. No evidence for mass. Gallbladder: Cholelithiasis. No biliary ductal dilatation. Pancreas: Normal attenuation without mass or ductal dilatation. Spleen: Normal in size. No evidence of mass. .    Adrenal Glands: No evidence for mass. Kidneys:    Right: Normal enhancement. No cortical mass. No hydronephrosis. Left:  Normal enhancement. Left renal cyst.  No hydronephrosis. Lymph Nodes: Upper abdominal lymph nodes. There is a 1.3 cm lymph node in the  jesse hepatis. Aorta:   Normal in caliber    PELVIS FINDINGS:    Bowel: Small Bowel: Normal in caliber with normal wall thickness. Large Bowel: Normal in caliber with normal wall thickness. Moderate stool  burden. Appendix: Normal appendix. Bladder: Underdistended. The uterus is surgically absent. There is no suspicious adnexal mass. There is no free air. There is a small volume of ascites. There is soft tissue stranding and some ill-defined fluid in the deep  subcutaneous tissues which measures approximately 4.8 x 3.1 cm. This is in the  right lateral lumbar region. Bones: No acute finding. Impression  Soft tissue stranding and ill-defined fluid in the right paralumbar region  measuring 4.8 x 3.1 cm. Small volume of ascites in the abdomen and pelvis.     No definite acute bowel abnormality. Moderate stool burden. Additional incidentals as above. MRI Results (most recent):  Results from [place] encounter on 03/31/14    MRI BREAST BX VAC ASSIST RT    Addendum 4/3/2014  4:54 PM  Addendum: Pathology changes from right breast biopsy 12:00 position-  Fibrocystic changes with florid ductal epithelial hyperplasia. The findings are benign and concordant. Narrative  MR guided left breast biopsy    HISTORY: Left breast cancer, abnormal MRI    COMPARISON: MRI March 2014    FINDINGS:    Informed consent was obtained. The risks of the procedure including but not  limited to bleeding and infection were reviewed with the patient who wished to  proceed. Preliminary pre and post contrast images were performed. 20cc  Magnevist IV was administered. With the patient positioned prone, and following placement in the compression  device, the left breast was imaged, and the lesion in the 12:00 left breast was  targeted. Appropriate coordinates records were obtained. The skin was  cleansed and local anesthesia administered. After making a small incision in  the skin with a # 11 scalpel blade, the trocar and biopsy sheath were advanced  to the appropriate depth. After confirmation of appropriate positioning the 9  gauge 20 mm vacuum assisted biopsy needle was advanced to the appropriate  depth. Subsequently a series of approximately 12 vacuum assisted samples were  acquired in a radial fashion. Prior to removal of the sheath, using coaxial technique, a Trip4real  cylinder shaped clip was placed to oscar area sampled. The patient was removed from the biopsy device, the wound was dressed and post  biopsy instructions were given to the patient. The samples were sent to the  laboratory for analysis.     Diagnostic Mammogram left Breast: CC and MLO views were obtained of the left  breast and document the clip at the site of sampling in the 12:00 left breast.    Complications: No complications. Specimen: 12 vacuum assisted samples  Assistant: None. EBL: 5 cc    Impression  :    Status post MRI guided biopsy of an abnormal area of enhancement in the 12:00  left breast.  Area sampled marked with a     clip. Samples sent to the  laboratory for analysis. Labs:    Recent Results (from the past 48 hour(s))   CBC WITH AUTOMATED DIFF    Collection Time: 08/10/22  2:33 AM   Result Value Ref Range    WBC 8.5 4.6 - 13.2 K/uL    RBC 3.83 (L) 4.20 - 5.30 M/uL    HGB 11.5 (L) 12.0 - 16.0 g/dL    HCT 35.1 35.0 - 45.0 %    MCV 91.6 78.0 - 100.0 FL    MCH 30.0 24.0 - 34.0 PG    MCHC 32.8 31.0 - 37.0 g/dL    RDW 14.3 11.6 - 14.5 %    PLATELET 381 346 - 883 K/uL    MPV 11.3 9.2 - 11.8 FL    NRBC 0.2 (H) 0  WBC    ABSOLUTE NRBC 0.02 (H) 0.00 - 0.01 K/uL    NEUTROPHILS 51 40 - 73 %    LYMPHOCYTES 36 21 - 52 %    MONOCYTES 10 3 - 10 %    EOSINOPHILS 0 0 - 5 %    BASOPHILS 0 0 - 2 %    IMMATURE GRANULOCYTES 3 (H) 0.0 - 0.5 %    ABS. NEUTROPHILS 4.3 1.8 - 8.0 K/UL    ABS. LYMPHOCYTES 3.0 0.9 - 3.6 K/UL    ABS. MONOCYTES 0.9 0.05 - 1.2 K/UL    ABS. EOSINOPHILS 0.0 0.0 - 0.4 K/UL    ABS. BASOPHILS 0.0 0.0 - 0.1 K/UL    ABS. IMM. GRANS. 0.2 (H) 0.00 - 0.04 K/UL    DF AUTOMATED     METABOLIC PANEL, COMPREHENSIVE    Collection Time: 08/10/22  2:33 AM   Result Value Ref Range    Sodium 139 136 - 145 mmol/L    Potassium 4.8 3.5 - 5.5 mmol/L    Chloride 105 100 - 111 mmol/L    CO2 30 21 - 32 mmol/L    Anion gap 4 3.0 - 18 mmol/L    Glucose 101 (H) 74 - 99 mg/dL    BUN 36 (H) 7.0 - 18 MG/DL    Creatinine 0.93 0.6 - 1.3 MG/DL    BUN/Creatinine ratio 39 (H) 12 - 20      GFR est AA >60 >60 ml/min/1.73m2    GFR est non-AA 60 (L) >60 ml/min/1.73m2    Calcium 9.3 8.5 - 10.1 MG/DL    Bilirubin, total 0.4 0.2 - 1.0 MG/DL    ALT (SGPT) 48 13 - 56 U/L    AST (SGOT) 31 10 - 38 U/L    Alk.  phosphatase 99 45 - 117 U/L    Protein, total 6.3 (L) 6.4 - 8.2 g/dL    Albumin 2.4 (L) 3.4 - 5.0 g/dL    Globulin 3.9 2.0 - 4.0 g/dL    A-G Ratio 0.6 (L) 0.8 - 1.7     VALPROIC ACID    Collection Time: 08/11/22  1:18 AM   Result Value Ref Range    Valproic acid 7 (L) 50 - 100 ug/ml       Signed By: Tramaine Almodovar MD     August 11, 2022      I spent 25 minutes with the patient in face-to-face consultation, of which greater than 50% was spent in counseling and coordination of care as described above    Disclaimer: Sections of this note are dictated using utilizing voice recognition software. Minor typographical errors may be present. If questions arise, please do not hesitate to contact me or call our department.

## 2022-08-11 NOTE — PROGRESS NOTES
Problem: Self Care Deficits Care Plan (Adult)  Goal: *Acute Goals and Plan of Care (Insert Text)  Description: Occupational Therapy Goals  Initiated 8/2/2022 within 7 day(s), re-evaluation 8/11/2022 - pt making steady progress towards goals, goals # 1 & 4 upgraded      1. Patient will perform bed mobility in preparation for self-care with Supv bed simulated to home environment . 2.  Patient will perform upper body dressing with supervision/set-up. 3.  Patient will perform functional activity sitting EOB with modified independence > 5 minutes, G balance. 4.  Patient will perform toilet transfers with contact guard assist using RW. 5.  Patient will perform all aspects of toileting with minimal assistance/contact guard assist.  6.  Patient will participate in upper extremity therapeutic exercise/activities with supervision/set-up for 8 minutes. 7.  Patient will utilize energy conservation techniques during functional activities with verbal cues. Prior Level of Function: Patient reports she needed assist with self-care prn from her brothers and used a rollator for functional mobility PTA. Outcome: Progressing Towards Goal   OCCUPATIONAL THERAPY RE-EVALUATION    Patient: Idalia Wagner (55 y.o. female)  Date: 8/11/2022  Primary Diagnosis: Sepsis ([facility] [address] [A41.9]  Leukocytosis [D72.829]  Open wound of lower back [S31.000A]       Precautions:  Fall, Skin    ASSESSMENT :  Nursing/RN cleared for pt to participate in OT tx session. Patient was seen with PT to maximize patient safety, participation, and functional mobility in preparation for self-care tasks. Pt provided encouragement for out of bed activity d/t pt report of poor confidence in her functional ability. Bed mobility: Mod A supine <-> sit edge of bed. Toilet transfer: Min A x 2 using RW. Toileting: Mod A for thoroughness, Mod A STS from toilet with vc's for use of wall grab bar, nursing notified of recommendation for bedside commode with RW.  Pt laying supine in bed at end of tx session, transport arrived for SKY procedure. Patient will benefit from skilled intervention to address the above impairments. Patient's rehabilitation potential is considered to be Good  Factors which may influence rehabilitation potential include:   []             None noted  []             Mental ability/status  [x]             Medical condition  []             Home/family situation and support systems  []             Safety awareness  []             Pain tolerance/management  []             Other:      PLAN :  Recommendations and Planned Interventions:   [x]               Self Care Training                  [x]      Therapeutic Activities  [x]               Functional Mobility Training   []      Cognitive Retraining  [x]               Therapeutic Exercises           [x]      Endurance Activities  [x]               Balance Training                    [x]      Neuromuscular Re-Education  []               Visual/Perceptual Training     [x]      Home Safety Training  [x]               Patient Education                   [x]      Family Training/Education  [x]               Other (comment):    Frequency/Duration: Patient will be followed by occupational therapy 3-5 times a week to address goals. Further Equipment Recommendations for Discharge: bedside commode, shower chair, rolling walker, and wheelchair     AMPAC: Based on an AM-PAC score of 13/24 and their current ADL deficits; it is recommended that the patient have 3-5 sessions per week of Occupational Therapy at d/c to increase the patient's independence. This AMPAC score should be considered in conjunction with interdisciplinary team recommendations to determine the most appropriate discharge setting. Patient's social support, diagnosis, medical stability, and prior level of function should also be taken into consideration. SUBJECTIVE:   Patient stated I have lots of friends and family to help me. 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    Cancer ([facility] [address] 01/01/2014    breast    Ductal carcinoma in situ of breast     Functional quadriplegia (HCC)     as of 7/2022    Glaucoma     History of seizure disorder     Open back wound 07/2022    Pressure wound for lying on a table for indeterminate amount of time    Pituitary adenoma with extrasellar extension ([facility] [address]     Primary hypertension      Past Surgical History:   Procedure Laterality Date    HX HYSTERECTOMY      partial    HX MASTECTOMY  4/30/2014    RIGHT PARTIAL MASTECTOMY WITH NEEDLE LOCALIZATION MAMMOGRAM SENTINEL LYMPH NODE BIOPSY performed by Abdulaziz Maza MD at [address] [facility] MAIN OR     Barriers to Learning/Limitations: None  Compensate with: visual, verbal, tactile, kinesthetic cues/model    Home Situation:   Home Situation  Home Environment: Private residence  # Steps to Enter: 0  One/Two Story Residence: One story  Living Alone: Yes  Support Systems: Other Family Member(s)  Patient Expects to be Discharged to[de-identified] Skilled nursing facility  Current DME Used/Available at Home: [address] [facility], [place], quad, Walker, rolling, [place], rollator  Tub or Shower Type: Tub/Shower combination  []  Right hand dominant   []  Left hand dominant    Cognitive/Behavioral Status:  Neurologic State: Alert  Orientation Level: Oriented X4  Cognition: Follows commands  Safety/Judgement: Fall prevention    Skin: multiple wounds  Edema: BLEs    Vision/Perceptual:  appears intact, able to tell correct time on wall clock       Coordination: BUE  Coordination: Within functional limits  Fine Motor Skills-Upper: Left Intact; Right Intact    Gross Motor Skills-Upper: Left Intact; Right Intact    Balance:  Sitting: Impaired; With support  Sitting - Static: Good (unsupported)  Sitting - Dynamic: Fair (occasional) (+)  Standing: Impaired; With support  Standing - Static: Fair  Standing - Dynamic : Fair    Strength: BUE  Strength: Generally decreased, functional     Tone & Sensation: BUE  Tone: Normal  Sensation: Intact  Range of Motion: BUE  AROM: Within functional limits     Functional Mobility and Transfers for ADLs:  Bed Mobility:     Supine to Sit: Moderate assistance  Sit to Supine: Moderate assistance  Scooting: Minimum assistance  Transfers:  Sit to Stand: Moderate assistance (from low toilet)  Stand to Sit: Moderate assistance (from low toilet)     Toilet Transfer : Minimum assistance;Assist x2      ADL Assessment:   Feeding: Contact guard assistance    Oral Facial Hygiene/Grooming: Stand-by assistance    Bathing: Maximum assistance    Upper Body Dressing: Minimum assistance    Lower Body Dressing: Total assistance    Toileting: Moderate assistance     ADL Intervention:  Grooming  Position Performed: Seated edge of bed  Washing Hands:  ([place]ands wipes)    Lower Body Dressing Assistance  Socks: Total assistance (dependent)  Position Performed: Seated edge of bed    Toileting  Toileting Assistance: Moderate assistance    Cognitive Retraining  Safety/Judgement: Fall prevention    Pain:  Pain level pre-treatment: 0/10   Pain level post-treatment: 0/10    Activity Tolerance:   fair  Please refer to the flowsheet for vital signs taken during this treatment. After treatment:   [] Patient left in no apparent distress sitting up in chair  [x] Patient left in no apparent distress in bed  [x] Call bell left within reach  [x] Nursing notified  [] Caregiver present  [] Bed alarm activated    COMMUNICATION/EDUCATION:   [x] Role of Occupational Therapy in the acute care setting  [x] Home safety education was provided and the patient/caregiver indicated understanding.   [x] Patient/family have participated as able in goal setting and plan of care.  [x] Patient/family agree to work toward stated goals and plan of care. [] Patient understands intent and goals of therapy, but is neutral about his/her participation. [] Patient is unable to participate in goal setting and plan of care. Thank you for this referral.  Karis Ptaton  Time Calculation: 63 mins    Daniel Myers AM-PAC® Daily Activity Inpatient Short Form (6-Clicks)*    How much HELP from another person does the patient currently need    (If the patient hasn't done an activity recently, how much help from another person do you think he/she would need if he/she tried?)   Total (Total A or Dep)   A Lot  (Mod to Max A)   A Little (Sup or Min A)   None (Mod I to I)   Putting on and taking off regular lower body clothing? [x] 1 [] 2 [] 3 [] 4   2. Bathing (including washing, rinsing,      drying)? [] 1 [x] 2 [] 3 [] 4   3. Toileting, which includes using toilet, bedpan or urinal?   [] 1 [x] 2 [] 3 [] 4   4. Putting on and taking off regular upper body clothing? [] 1 [x] 2 [] 3 [] 4   5. Taking care of personal grooming such as brushing teeth? [] 1 [] 2 [x] 3 [] 4   6. Eating meals? [] 1 [] 2 [x] 3 [] 4      Based on an AM-PAC score of **/24 and their current ADL deficits; it is recommended that the patient have 5-7 sessions per week of Occupational Therapy at d/c to increase the patient's independence. Currently, this patient demonstrates the potential endurance, and/or tolerance for 3 hours of therapy each day at d/c. Based on an AM-PAC score of **/24 and their current ADL deficits; it is recommended that the patient have 3-5 sessions per week of Occupational Therapy at d/c to increase the patient's independence. Based on an AM-PAC score of **/24 and their current ADL deficits; it is recommended that the patient have 2-3 sessions per week of Occupational Therapy at d/c to increase the patient's independence.       At this time and based on an AM-PAC score of **/24, no further OT is recommended upon discharge due to (i.e. patient at baseline functional statusetc). Recommend patient returns to prior setting with prior services.

## 2022-08-11 NOTE — ROUTINE PROCESS
Bedside and Verbal shift change report given to CRYSTAL Connors (oncoming nurse) by Idania Marie RN (offgoing nurse). Report included the following information SBAR.

## 2022-08-11 NOTE — PROGRESS NOTES
Problem: Falls - Risk of  Goal: *Absence of Falls  Description: Document [place] Fall Risk and appropriate interventions in the flowsheet. Outcome: Progressing Towards Goal  Note: Fall Risk Interventions:  Mobility Interventions: Bed/chair exit alarm, Patient to call before getting OOB    Mentation Interventions: Bed/chair exit alarm, Increase mobility, Toileting rounds    Medication Interventions: Patient to call before getting OOB, Evaluate medications/consider consulting pharmacy    Elimination Interventions: Call light in reach, Patient to call for help with toileting needs    History of Falls Interventions: Bed/chair exit alarm, Door open when patient unattended         Problem: Pressure Injury - Risk of  Goal: *Prevention of pressure injury  Description: Document Bret Scale and appropriate interventions in the flowsheet. Outcome: Progressing Towards Goal  Note: Pressure Injury Interventions:  Sensory Interventions: Float heels, Suspension boots, Turn and reposition approx. every two hours (pillows and wedges if needed), Assess changes in LOC    Moisture Interventions: Absorbent underpads, Check for incontinence Q2 hours and as needed, Internal/External urinary devices    Activity Interventions: PT/OT evaluation, Pressure redistribution bed/mattress(bed type)    Mobility Interventions: PT/OT evaluation, Turn and reposition approx.  every two hours(pillow and wedges), Float heels    Nutrition Interventions: Document food/fluid/supplement intake    Friction and Shear Interventions: Apply protective barrier, creams and emollients, Feet elevated on foot rest                Problem: Pain  Goal: *Control of Pain  Outcome: Progressing Towards Goal

## 2022-08-11 NOTE — PROGRESS NOTES
Comprehensive Nutrition Assessment    Type and Reason for Visit: Reassess, Positive nutrition screen, Wound    Nutrition Recommendations/Plan:   Update diet order with breakfast preferences: english muffins, coffee, and cereal (hot or cold). Continue with snack and Ensure BID. Continue to monitor intake/tolerance of diet/supplements, weight, labs, and POC. Malnutrition Assessment:  Malnutrition Status: At risk for malnutrition (specify) (increased nutrient needs r/t wounds and reports of poor intake PTA) (08/04/22 1229)      Nutrition Assessment:    No PO data for pt. Per physicians notes, pt with wounds with necrotic tissue are not anticipated to heal. Per palliative medicine notes, pt currently DNR with limited interventions with long-term feeding tube if indicated. PT set to have PICC placement 8/10, being referred to IR for placement because of difficulty with placement. Spoke to pt- endorsed appetite Improvement, though expressed frustration over foods sometimes being overcooked. Pt enjoys the Ensure and drinks. Expressed food preferences for breakfast, claiming to be sick of eggs. Nutrition Related Findings:    BM 8/10. Labs reviewed. Pertinent meds- cyanocobalamin, Synthroid, Zofran, Miralax, Pyridoxine. Wound Type: Multiple, Pressure injury, Unstageable (multiple blisters and pressure injuries)    Current Nutrition Intake & Therapies:  Average Meal Intake: 51-75%  Average Supplement Intake: %  ADULT DIET Regular; Low Fat/Low Chol/High Fiber/MILAGRO; Low Sodium (2 gm); 2000 ml; Pt requested ginger ale with L/D, fruit with each meal. Pt likes tomato soup, salads, chicken/tuna/egg salad. Thanks! ADULT ORAL NUTRITION SUPPLEMENT HS Snack, PM Snack; Snack;  Whatever you got  ADULT ORAL NUTRITION SUPPLEMENT Breakfast, Dinner; Standard High Calorie/High Protein    Anthropometric Measures:  Height: 5' 3\" (160 cm)  Ideal Body Weight (IBW): 115 lbs (52 kg)  Admission Body Weight: 173 lb 4.8 oz (bed scale)  Current Body Wt:  83.1 kg (183 lb 3.2 oz), 159.3 % IBW. Bed scale  Current BMI (kg/m2): 32.5  Usual Body Weight: 89.4 kg (197 lb) (6/26/19- pt unable to tell usual wt)  % Weight Change (Calculated): -6.8  Weight Adjustment: No adjustment                 BMI Category: Obese class 1 (BMI 30.0-34. 9)    Estimated Daily Nutrient Needs:  Energy Requirements Based On: Kcal/kg (25-30)  Weight Used for Energy Requirements: Current  Energy (kcal/day): 2083 - 2499  Weight Used for Protein Requirements: Current (1.2-1.5)  Protein (g/day): 100 - 125  Method Used for Fluid Requirements: 1 ml/kcal (restricted to 2L per diet order)  Fluid (ml/day): 2083 - 2499    Nutrition Diagnosis:   Increased nutrient needs related to increased demand for energy/nutrients as evidenced by wounds    Nutrition Interventions:   Food and/or Nutrient Delivery: Continue current diet, Modify current diet, Continue oral nutrition supplement  Nutrition Education/Counseling: No recommendations at this time  Coordination of Nutrition Care: Continue to monitor while inpatient  Plan of Care discussed with: Patient    Goals:  Previous Goal Met: Progressing toward goal(s)  Goals: Meet at least 75% of estimated needs, by next RD assessment       Nutrition Monitoring and Evaluation:   Behavioral-Environmental Outcomes: None identified  Food/Nutrient Intake Outcomes: Diet advancement/tolerance, Food and nutrient intake, Supplement intake  Physical Signs/Symptoms Outcomes: Biochemical data, Meal time behavior, Weight, Skin    Discharge Planning:    Continue current diet, Continue oral nutrition supplement    Santo Lester  Contact: [phone]

## 2022-08-11 NOTE — PROGRESS NOTES
Palliative Medicine    Goals of care defined. Patient has AMD and POST forms on file. Will sign off. Please reconsult team as needed/if appropriate. CODE STATUS:DNR/DNI, LIMITED INTERVENTIONS, OK WITH FEEDING TUBE    Thank you for the Palliative Medicine consult and allowing us to participate in the care of Ms. Georgina Lewis.       Shantel Nolasco RN  Palliative Medicine Inpatient RN  [facility]  Palliative COPE Line: [phone] ([phone])

## 2022-08-11 NOTE — PROGRESS NOTES
[facility]: [phone] ([phone])  [facility]: [phone]    Goals of care defined. POST form on file. Palliative team will sign off. Please consult team as needed, if appropriate. Thank you for the Palliative Medicine consult and allowing us to participate in the care of Ms. Suha Hanna. CODE STATUS - DNR/DNI OK WITH LONG TERM FEEDING TUBE.        Katia JACKN, RN, [facility]  Palliative Medicine Inpatient RN  [address]: [phone]

## 2022-08-11 NOTE — PROGRESS NOTES
[facility] Hospitalist Group  Progress Note    Patient: Angela Butt Age: 74 y.o. : 1955 MR#: [id number] SSN: [id number]  Date/Time: 8/10/2022     Subjective:   Pt has no complaints. States she has no intentions of going to SNF, wants to go home. Appears comfortable. Assessment/Plan:   74year old female admitted after presenting to [facility] ED w/ c/o left foot blister and left ankle pain. Also had findings of back wound that was malodorous and was admitted for management of severe sepsis secondary to lower back wound and draining left ankle blister. Sepsis 2y to Left foot blister, left ankle pain with chronic wounds on her lower back. CT abdomen and pelvis soft tissue stranding and ill-defined fluid collection along the right paralumbar region. S/p I&D with juanjose pus, necrotic muscle skin and subcutaneous tissue to bone. Continues broad-spectrum IV antibiotics, currently on Meropenem    Concern raised by ID specialist regarding interaction between Meropenem and Depakote. Discussed w/ pharmacist, level of Depakote might be lower in setting of concomitant Meropenem use, and increase risk of seizure episode. Neurology consulted for guidance on AED choice. Appreciate assistance. Discussed w/ ID, okay at this time for PICC line insertion as last blood cx is negative. PICC line team unable to insert line, PLAN to consult IR for PICC line placement. Bacteremia with wound cultures growing Klebsiella and Proteus mirabilis- continues on IV antibiotics, will defer duration and choice of ABx to infectious disease. Brain mass-patient currently has a neurosurgical appointment, was supposed to undergo biopsy for possible glioblastoma however it is currently pending. Continue dexamethasone 2 mg twice daily. Per patient's brothers she has been given timeframe of approximately 9 months to live.   History of hypertension-resume home medications, monitor blood pressure  History of hypothyroidism-continue Synthroid  History of seizure disorder-continue gabapentin and Depakote  History of breast cancer-continue tamoxifen  DVT prophylaxis-Lovenox  Full code        DISPO: discussed during IDR. Pt at this point has declined SNF and wants to go home, however, home health for home IV ABx via PICC line not possible, per CM team. Per psych consultant, pt able to make medical decisions at this time. Will cont to work w/ CM for safe d/c.      Estrella Thurman MD  08/10/22      Case discussed with:  [x]Patient  []Family  [x]Nursing  [x]Case Management  DVT Prophylaxis:  [x]Lovenox  []Hep SQ  []SCDs  []Coumadin   []On Heparin gtt    Objective:   VS: Visit Vitals  /69 (BP 1 Location: Right arm, BP Patient Position: At rest)   Pulse 65   Temp 98.6 °F (37 °C)   Resp 16   Ht 5' 3\" (1.6 m)   Wt 83.1 kg (183 lb 3.2 oz)   SpO2 99%   BMI 32.45 kg/m²        Tmax/24hrs: Temp (24hrs), Av.1 °F (36.7 °C), Min:97.3 °F (36.3 °C), Max:98.9 °F (37.2 °C)  IOBRIEF  Intake/Output Summary (Last 24 hours) at 8/10/2022 2136  Last data filed at 8/10/2022 3683  Gross per 24 hour   Intake 600 ml   Output --   Net 600 ml       General:  Alert, cooperative, no acute distress, blind in right eye  Pulmonary:  CTA Bilaterally. No Wheezing/Rhonchi/Rales. Cardiovascular: Regular rate and Rhythm. GI:  Soft, Non distended, Non tender. + Bowel sounds. Extremities: Bilateral lower extremity edema with open wounds  Neurologic: Alert and oriented X 3. No acute neuro deficits.   Additional:    Medications:   Current Facility-Administered Medications   Medication Dose Route Frequency    meropenem (MERREM) 1 g in 0.9% sodium chloride (MBP/ADV) 50 mL MBP  1 g IntraVENous Q8H    collagenase (SANTYL) 250 unit/gram ointment   Topical BID    sodium hypochlorite (QUARTER STRENGTH DAKIN'S) 0.125% irrigation (bottle)   Topical BID    melatonin (rapid dissolve) tablet 5 mg  5 mg Oral QHS PRN    sodium chloride (NS) flush 5-40 mL  5-40 mL IntraVENous Q8H    sodium chloride (NS) flush 5-40 mL  5-40 mL IntraVENous PRN    acetaminophen (TYLENOL) tablet 650 mg  650 mg Oral Q6H PRN    Or    acetaminophen (TYLENOL) suppository 650 mg  650 mg Rectal Q6H PRN    polyethylene glycol (MIRALAX) packet 17 g  17 g Oral DAILY PRN    ondansetron (ZOFRAN ODT) tablet 4 mg  4 mg Oral Q8H PRN    Or    ondansetron (ZOFRAN) injection 4 mg  4 mg IntraVENous Q6H PRN    albuterol-ipratropium (DUO-NEB) 2.5 MG-0.5 MG/3 ML  3 mL Nebulization Q6H PRN    nitroglycerin (NITROBID) 2 % ointment 0.5 Inch  0.5 Inch Topical Q6H PRN    naloxone (NARCAN) injection 0.4 mg  0.4 mg IntraVENous EVERY 2 MINUTES AS NEEDED    amLODIPine (NORVASC) tablet 10 mg  10 mg Oral DAILY    cyanocobalamin (VITAMIN B12) sublingual tablet 2,500 mcg  2,500 mcg Oral DAILY    dexAMETHasone (DECADRON) tablet 2 mg  2 mg Oral Q12H    divalproex ER (DEPAKOTE ER) 24 hour tablet 500 mg  500 mg Oral DAILY    gabapentin (NEURONTIN) capsule 300 mg  300 mg Oral BID    hydrALAZINE (APRESOLINE) tablet 25 mg  25 mg Oral TID    levothyroxine (SYNTHROID) tablet 88 mcg  88 mcg Oral 6am    atenoloL (TENORMIN) tablet 100 mg  100 mg Oral DAILY    oxyCODONE-acetaminophen (PERCOCET) 5-325 mg per tablet 1 Tablet  1 Tablet Oral Q4H PRN    pyridoxine (vitamin B6) (VITAMIN B-6) tablet 200 mg  200 mg Oral DAILY       Imaging:   XR Results (most recent):  Results from Hospital Encounter encounter on 08/01/22    XR CHEST PORT    Narrative  EXAM: XR CHEST PORT    CLINICAL INDICATION/HISTORY: 74 years Female. chest pain. Additional History: None    TECHNIQUE: Frontal view of the chest    COMPARISON: Chest radiograph 3/2/2020    FINDINGS:    The cardiac silhouette appears within normal limits. Tortuosity of the thoracic  aorta, unchanged in appearance. Pulmonary vasculature appears within normal  limits. No confluent airspace opacity is appreciated. No definite evidence of  pleural effusion or pneumothorax.  No acute osseous abnormality appreciated. Impression  No radiographic evidence of acute cardiopulmonary process. CT Results (most recent):  Results from Hospital Encounter encounter on 08/01/22    CT ABD PELV W CONT    Narrative  CT Abdomen And Pelvis with Intravenous Contrast    INDICATION: Lumbar soft tissue abscess. TECHNIQUE: 5 mm collimation axial images obtained from the diaphragm to the  level of the pubic symphysis following the uneventful administration of  100 cc  of low osmolar, nonionic intravenous contrast.    Dose reduction techniques used: Automated exposure control, adjustment of the  mAs and/or kVp according to patient size, standardized low-dose protocol, and/or  iterative reconstruction technique. COMPARISON: None. ABDOMEN FINDINGS:    Lung Bases: Bibasilar atelectasis. Small pleural effusions. Liver: Normal attenuation. No evidence for mass. Gallbladder: Cholelithiasis. No biliary ductal dilatation. Pancreas: Normal attenuation without mass or ductal dilatation. Spleen: Normal in size. No evidence of mass. .    Adrenal Glands: No evidence for mass. Kidneys:    Right: Normal enhancement. No cortical mass. No hydronephrosis. Left:  Normal enhancement. Left renal cyst.  No hydronephrosis. Lymph Nodes: Upper abdominal lymph nodes. There is a 1.3 cm lymph node in the  jesse hepatis. Aorta:   Normal in caliber    PELVIS FINDINGS:    Bowel: Small Bowel: Normal in caliber with normal wall thickness. Large Bowel: Normal in caliber with normal wall thickness. Moderate stool  burden. Appendix: Normal appendix. Bladder: Underdistended. The uterus is surgically absent. There is no suspicious adnexal mass. There is no free air. There is a small volume of ascites. There is soft tissue stranding and some ill-defined fluid in the deep  subcutaneous tissues which measures approximately 4.8 x 3.1 cm. This is in the  right lateral lumbar region.     Bones: No acute finding. Impression  Soft tissue stranding and ill-defined fluid in the right paralumbar region  measuring 4.8 x 3.1 cm. Small volume of ascites in the abdomen and pelvis. No definite acute bowel abnormality. Moderate stool burden. Additional incidentals as above. MRI Results (most recent):  Results from [place] encounter on 03/31/14    MRI BREAST BX VAC ASSIST RT    Addendum 4/3/2014  4:54 PM  Addendum: Pathology changes from right breast biopsy 12:00 position-  Fibrocystic changes with florid ductal epithelial hyperplasia. The findings are benign and concordant. Narrative  MR guided left breast biopsy    HISTORY: Left breast cancer, abnormal MRI    COMPARISON: MRI March 2014    FINDINGS:    Informed consent was obtained. The risks of the procedure including but not  limited to bleeding and infection were reviewed with the patient who wished to  proceed. Preliminary pre and post contrast images were performed. 20cc  Magnevist IV was administered. With the patient positioned prone, and following placement in the compression  device, the left breast was imaged, and the lesion in the 12:00 left breast was  targeted. Appropriate coordinates records were obtained. The skin was  cleansed and local anesthesia administered. After making a small incision in  the skin with a # 11 scalpel blade, the trocar and biopsy sheath were advanced  to the appropriate depth. After confirmation of appropriate positioning the 9  gauge 20 mm vacuum assisted biopsy needle was advanced to the appropriate  depth. Subsequently a series of approximately 12 vacuum assisted samples were  acquired in a radial fashion. Prior to removal of the sheath, using coaxial technique, a TargAnox trimark  cylinder shaped clip was placed to oscar area sampled. The patient was removed from the biopsy device, the wound was dressed and post  biopsy instructions were given to the patient.   The samples were sent to the  laboratory for analysis. Diagnostic Mammogram left Breast: CC and MLO views were obtained of the left  breast and document the clip at the site of sampling in the 12:00 left breast.    Complications: No complications. Specimen: 12 vacuum assisted samples  Assistant: None. EBL: 5 cc    Impression  :    Status post MRI guided biopsy of an abnormal area of enhancement in the 12:00  left breast.  Area sampled marked with a     clip. Samples sent to the  laboratory for analysis. Labs:    Recent Results (from the past 48 hour(s))   METABOLIC PANEL, COMPREHENSIVE    Collection Time: 08/09/22  3:28 AM   Result Value Ref Range    Sodium 139 136 - 145 mmol/L    Potassium 5.6 (H) 3.5 - 5.5 mmol/L    Chloride 105 100 - 111 mmol/L    CO2 29 21 - 32 mmol/L    Anion gap 5 3.0 - 18 mmol/L    Glucose 136 (H) 74 - 99 mg/dL    BUN 34 (H) 7.0 - 18 MG/DL    Creatinine 0.98 0.6 - 1.3 MG/DL    BUN/Creatinine ratio 35 (H) 12 - 20      GFR est AA >60 >60 ml/min/1.73m2    GFR est non-AA 56 (L) >60 ml/min/1.73m2    Calcium 9.1 8.5 - 10.1 MG/DL    Bilirubin, total 0.7 0.2 - 1.0 MG/DL    ALT (SGPT) 48 13 - 56 U/L    AST (SGOT) 38 10 - 38 U/L    Alk. phosphatase 88 45 - 117 U/L    Protein, total 6.2 (L) 6.4 - 8.2 g/dL    Albumin 2.4 (L) 3.4 - 5.0 g/dL    Globulin 3.8 2.0 - 4.0 g/dL    A-G Ratio 0.6 (L) 0.8 - 1.7     CBC WITH AUTOMATED DIFF    Collection Time: 08/09/22  6:20 AM   Result Value Ref Range    WBC 9.6 4.6 - 13.2 K/uL    RBC 4.08 (L) 4.20 - 5.30 M/uL    HGB 11.9 (L) 12.0 - 16.0 g/dL    HCT 37.3 35.0 - 45.0 %    MCV 91.4 78.0 - 100.0 FL    MCH 29.2 24.0 - 34.0 PG    MCHC 31.9 31.0 - 37.0 g/dL    RDW 14.6 (H) 11.6 - 14.5 %    PLATELET 585 202 - 223 K/uL    MPV 10.8 9.2 - 11.8 FL    NRBC 0.0 0  WBC    ABSOLUTE NRBC 0.00 0.00 - 0.01 K/uL    NEUTROPHILS 67 40 - 73 %    LYMPHOCYTES 23 21 - 52 %    MONOCYTES 7 3 - 10 %    EOSINOPHILS 0 0 - 5 %    BASOPHILS 0 0 - 2 %    IMMATURE GRANULOCYTES 3 (H) 0.0 - 0.5 %    ABS. NEUTROPHILS 6.5 1.8 - 8.0 K/UL    ABS. LYMPHOCYTES 2.2 0.9 - 3.6 K/UL    ABS. MONOCYTES 0.7 0.05 - 1.2 K/UL    ABS. EOSINOPHILS 0.0 0.0 - 0.4 K/UL    ABS. BASOPHILS 0.0 0.0 - 0.1 K/UL    ABS. IMM. GRANS. 0.3 (H) 0.00 - 0.04 K/UL    DF AUTOMATED     CBC WITH AUTOMATED DIFF    Collection Time: 08/10/22  2:33 AM   Result Value Ref Range    WBC 8.5 4.6 - 13.2 K/uL    RBC 3.83 (L) 4.20 - 5.30 M/uL    HGB 11.5 (L) 12.0 - 16.0 g/dL    HCT 35.1 35.0 - 45.0 %    MCV 91.6 78.0 - 100.0 FL    MCH 30.0 24.0 - 34.0 PG    MCHC 32.8 31.0 - 37.0 g/dL    RDW 14.3 11.6 - 14.5 %    PLATELET 564 836 - 358 K/uL    MPV 11.3 9.2 - 11.8 FL    NRBC 0.2 (H) 0  WBC    ABSOLUTE NRBC 0.02 (H) 0.00 - 0.01 K/uL    NEUTROPHILS 51 40 - 73 %    LYMPHOCYTES 36 21 - 52 %    MONOCYTES 10 3 - 10 %    EOSINOPHILS 0 0 - 5 %    BASOPHILS 0 0 - 2 %    IMMATURE GRANULOCYTES 3 (H) 0.0 - 0.5 %    ABS. NEUTROPHILS 4.3 1.8 - 8.0 K/UL    ABS. LYMPHOCYTES 3.0 0.9 - 3.6 K/UL    ABS. MONOCYTES 0.9 0.05 - 1.2 K/UL    ABS. EOSINOPHILS 0.0 0.0 - 0.4 K/UL    ABS. BASOPHILS 0.0 0.0 - 0.1 K/UL    ABS. IMM. GRANS. 0.2 (H) 0.00 - 0.04 K/UL    DF AUTOMATED     METABOLIC PANEL, COMPREHENSIVE    Collection Time: 08/10/22  2:33 AM   Result Value Ref Range    Sodium 139 136 - 145 mmol/L    Potassium 4.8 3.5 - 5.5 mmol/L    Chloride 105 100 - 111 mmol/L    CO2 30 21 - 32 mmol/L    Anion gap 4 3.0 - 18 mmol/L    Glucose 101 (H) 74 - 99 mg/dL    BUN 36 (H) 7.0 - 18 MG/DL    Creatinine 0.93 0.6 - 1.3 MG/DL    BUN/Creatinine ratio 39 (H) 12 - 20      GFR est AA >60 >60 ml/min/1.73m2    GFR est non-AA 60 (L) >60 ml/min/1.73m2    Calcium 9.3 8.5 - 10.1 MG/DL    Bilirubin, total 0.4 0.2 - 1.0 MG/DL    ALT (SGPT) 48 13 - 56 U/L    AST (SGOT) 31 10 - 38 U/L    Alk.  phosphatase 99 45 - 117 U/L    Protein, total 6.3 (L) 6.4 - 8.2 g/dL    Albumin 2.4 (L) 3.4 - 5.0 g/dL    Globulin 3.9 2.0 - 4.0 g/dL    A-G Ratio 0.6 (L) 0.8 - 1.7         Signed By: Gayle Coppola MD     August 10, 2022      I spent 25 minutes with the patient in face-to-face consultation, of which greater than 50% was spent in counseling and coordination of care as described above    Disclaimer: Sections of this note are dictated using utilizing voice recognition software. Minor typographical errors may be present. If questions arise, please do not hesitate to contact me or call our department.

## 2022-08-11 NOTE — PROCEDURES
INTERVENTIONAL RADIOLOGY POST PICC LINE NOTE       August 11, 2022       3:07 PM     Preoperative Diagnosis:   IV Access    Postoperative Diagnosis:  Same. : Jigar Wheeler PA-C    Assistant:  None. Attending Physician: Mckay Lozano    Type of Anesthesia:  1% Lidocaine local    Procedure/Description: right basilic  upper extremity PICC Line. Findings:  right basilic 1 Maldivian catheter placed. Tip at SVC/RA junction. OK to use. Length 35 cm. Estimated blood Loss: Minimal    Specimen Removed: None    Drains: None     Complications:  None. Condition:  Stable.     Discharge Plan:  continue present therapy    Elaine Fournier PA-C

## 2022-08-12 PROCEDURE — 2709999900 HC NON-CHARGEABLE SUPPLY

## 2022-08-12 PROCEDURE — 97530 THERAPEUTIC ACTIVITIES: CPT

## 2022-08-12 PROCEDURE — 77030038269 HC DRN EXT URIN PURWCK BARD -A

## 2022-08-12 PROCEDURE — 74011000258 HC RX REV CODE- 258: Performed by: INTERNAL MEDICINE

## 2022-08-12 PROCEDURE — 74011000250 HC RX REV CODE- 250: Performed by: INTERNAL MEDICINE

## 2022-08-12 PROCEDURE — 65270000046 HC RM TELEMETRY

## 2022-08-12 PROCEDURE — 74011250637 HC RX REV CODE- 250/637: Performed by: INTERNAL MEDICINE

## 2022-08-12 PROCEDURE — 74011250636 HC RX REV CODE- 250/636: Performed by: INTERNAL MEDICINE

## 2022-08-12 RX ADMIN — Medication 2500 MCG: at 09:47

## 2022-08-12 RX ADMIN — GABAPENTIN 300 MG: 300 CAPSULE ORAL at 17:31

## 2022-08-12 RX ADMIN — DIVALPROEX SODIUM 500 MG: 500 TABLET, EXTENDED RELEASE ORAL at 09:46

## 2022-08-12 RX ADMIN — COLLAGENASE SANTYL: 250 OINTMENT TOPICAL at 09:49

## 2022-08-12 RX ADMIN — HYDRALAZINE HYDROCHLORIDE 25 MG: 50 TABLET, FILM COATED ORAL at 09:46

## 2022-08-12 RX ADMIN — DEXAMETHASONE 2 MG: 2 TABLET ORAL at 09:46

## 2022-08-12 RX ADMIN — DAKIN'S SOLUTION 0.125% (QUARTER STRENGTH) 1 ML: 0.12 SOLUTION at 22:19

## 2022-08-12 RX ADMIN — ATENOLOL 100 MG: 50 TABLET ORAL at 09:47

## 2022-08-12 RX ADMIN — MEROPENEM 1 G: 1 INJECTION INTRAVENOUS at 13:39

## 2022-08-12 RX ADMIN — MEROPENEM 1 G: 1 INJECTION INTRAVENOUS at 22:15

## 2022-08-12 RX ADMIN — SODIUM CHLORIDE, PRESERVATIVE FREE 10 ML: 5 INJECTION INTRAVENOUS at 22:15

## 2022-08-12 RX ADMIN — LEVOTHYROXINE SODIUM 88 MCG: 88 TABLET ORAL at 05:55

## 2022-08-12 RX ADMIN — SODIUM CHLORIDE, PRESERVATIVE FREE 10 ML: 5 INJECTION INTRAVENOUS at 05:56

## 2022-08-12 RX ADMIN — DAKIN'S SOLUTION 0.125% (QUARTER STRENGTH): 0.12 SOLUTION at 09:49

## 2022-08-12 RX ADMIN — SODIUM CHLORIDE, PRESERVATIVE FREE 10 ML: 5 INJECTION INTRAVENOUS at 13:42

## 2022-08-12 RX ADMIN — MEROPENEM 1 G: 1 INJECTION INTRAVENOUS at 05:56

## 2022-08-12 RX ADMIN — DEXAMETHASONE 2 MG: 2 TABLET ORAL at 22:15

## 2022-08-12 RX ADMIN — DIVALPROEX SODIUM 500 MG: 500 TABLET, EXTENDED RELEASE ORAL at 17:31

## 2022-08-12 RX ADMIN — HYDRALAZINE HYDROCHLORIDE 25 MG: 50 TABLET, FILM COATED ORAL at 17:31

## 2022-08-12 RX ADMIN — PYRIDOXINE HCL TAB 50 MG 200 MG: 50 TAB at 09:46

## 2022-08-12 RX ADMIN — HYDRALAZINE HYDROCHLORIDE 25 MG: 50 TABLET, FILM COATED ORAL at 22:15

## 2022-08-12 RX ADMIN — GABAPENTIN 300 MG: 300 CAPSULE ORAL at 09:46

## 2022-08-12 RX ADMIN — AMLODIPINE BESYLATE 10 MG: 10 TABLET ORAL at 09:46

## 2022-08-12 NOTE — PROGRESS NOTES
Infectious Disease progress Note        Reason: Gram-negative bloodstream infection    Current abx Prior abx   Zosyn since 8/1/2022  levofloxacin, vancomycin 8/1-8/3     Lines:       Assessment :     74 y.o. female with PMHx significant for htn, seizures, breast cancer, recently diagnosed glioblastoma with vasogenic edema (7/2022), recent hospitalization at [facility] who presented to [facility] ER on 8/1/22 with complaint of Left Foot Blister and Left Ankle Pain. [address] 7/1-7/4 for fall, diagnosed to have glioblastoma multiforme with mass effect s/p decadron    Hospitalization at [address] 7/14/22-7/22/22 for fall, AMS (declined hospice during that admission)    Clinical presentation concerning for sepsis-present on admission due to Bacteroides bloodstream infection.  -> Blood culture negative at 6 days    ->intera-abdominal source- No evidence of GI infection noted clinically , but CT a/p showed   4.8 x 3.1 cm fluid collection in the right lateral lumbar region-  Lumbar soft tissue abscess as a source of Bacteroides bloodstream infection-  ->S/p bedside I&D. 8/5. grossly necrotic wounds with juanjose pus to bone- cx positive for polyorganism- Enterobacter cloacae complex, Proteus, and Citrobacter spp as well as Bacteroides. Immunocompromise state due to concurrent steroids can mask the full clinical presentation    Surgery follow up appreciated. Plans for bedside I&D noted    Necrotic lower back ulcers, pressure necrosis right heel-likely due to prolonged period of unresponsiveness/pressure injury July 2022    bilateral foot blisters, left leg ulcer-no clinical evidence of infection noted on today's exam.  Wound culture 8/1/2022-Klebsiella, proteus, e.coli, MSSA-likely colonizer    Glioblastoma multiforme-plans for outpatient neurosurgery noted. Currently on Decadron  Distant history of seizures-on Depakote    Recommendations:    Continue meropenem, given R for Piptazo demonstrated by Enterobacter.  Low risk of increased seizure activity with meropenem, drug interaction with Depakote exists-    -She had neurology's evaluation for need for AEDs  given infection extending to the the bone- she will need a long course of IV abx- 6 weeks. End date: September 19    will need home health IV antibiotics, with weekly labs to include CBC and CMP while on IV antibiotics. PCP to follow-up results  Continue local wound care left leg ulcer, pressure offloading right heel, wound care back ulcers       Poor chances of wound healing looking at the extent of infection, and need for steroids. Follow up  wound cx & modify abx accordingly. F/u palliative care and psych consults to discuss goals of care. Above plan was discussed in details with patient and primary team. Please call me if any further questions or concerns. Will continue to participate in the care of this patient. --> Infectious disease will sign off. Please call back if any further questions    HPI:    Denies any fevers, chills. No new complaints or events.       Current Facility-Administered Medications   Medication Dose Route Frequency    divalproex ER (DEPAKOTE ER) 24 hour tablet 500 mg  500 mg Oral BID    meropenem (MERREM) 1 g in 0.9% sodium chloride (MBP/ADV) 50 mL MBP  1 g IntraVENous Q8H    collagenase (SANTYL) 250 unit/gram ointment   Topical BID    sodium hypochlorite (QUARTER STRENGTH DAKIN'S) 0.125% irrigation (bottle)   Topical BID    melatonin (rapid dissolve) tablet 5 mg  5 mg Oral QHS PRN    sodium chloride (NS) flush 5-40 mL  5-40 mL IntraVENous Q8H    sodium chloride (NS) flush 5-40 mL  5-40 mL IntraVENous PRN    acetaminophen (TYLENOL) tablet 650 mg  650 mg Oral Q6H PRN    Or    acetaminophen (TYLENOL) suppository 650 mg  650 mg Rectal Q6H PRN    polyethylene glycol (MIRALAX) packet 17 g  17 g Oral DAILY PRN    ondansetron (ZOFRAN ODT) tablet 4 mg  4 mg Oral Q8H PRN    Or    ondansetron (ZOFRAN) injection 4 mg  4 mg IntraVENous Q6H PRN albuterol-ipratropium (DUO-NEB) 2.5 MG-0.5 MG/3 ML  3 mL Nebulization Q6H PRN    nitroglycerin (NITROBID) 2 % ointment 0.5 Inch  0.5 Inch Topical Q6H PRN    naloxone (NARCAN) injection 0.4 mg  0.4 mg IntraVENous EVERY 2 MINUTES AS NEEDED    amLODIPine (NORVASC) tablet 10 mg  10 mg Oral DAILY    cyanocobalamin (VITAMIN B12) sublingual tablet 2,500 mcg  2,500 mcg Oral DAILY    dexAMETHasone (DECADRON) tablet 2 mg  2 mg Oral Q12H    gabapentin (NEURONTIN) capsule 300 mg  300 mg Oral BID    hydrALAZINE (APRESOLINE) tablet 25 mg  25 mg Oral TID    levothyroxine (SYNTHROID) tablet 88 mcg  88 mcg Oral 6am    atenoloL (TENORMIN) tablet 100 mg  100 mg Oral DAILY    oxyCODONE-acetaminophen (PERCOCET) 5-325 mg per tablet 1 Tablet  1 Tablet Oral Q4H PRN    pyridoxine (vitamin B6) (VITAMIN B-6) tablet 200 mg  200 mg Oral DAILY       Allergies: Patient has no known allergies. Temp (24hrs), Av.4 °F (36.9 °C), Min:97.9 °F (36.6 °C), Max:99.3 °F (37.4 °C)    Visit Vitals  /77 (BP 1 Location: Left lower arm, BP Patient Position: At rest)   Pulse 73   Temp 99.3 °F (37.4 °C)   Resp 16   Ht 5' 3\" (1.6 m)   Wt 83.1 kg (183 lb 3.2 oz)   SpO2 98%   BMI 32.45 kg/m²       ROS: 12 point ROS obtained in details. Pertinent positives as mentioned in HPI,   otherwise negative    Physical Exam:    General:  female patient laying on the bed AAOx3 in no acute distress. General:   awake alert and oriented   HEENT:  Normocephalic, atraumatic, right eye cataract?,   Dentition good. Neck supple, no bruits. Lymph Nodes:   Not examined   Lungs:   non-labored, bilateral chest movements equal, no audible wheezing   Heart:  RRR, s1 and s2; no  rubs or gallops, no edema   Abdomen:  soft, non-distended, Non-tender   Genitourinary:  No galvez   Extremities:   superficial pressure necrosis right heel, blister on dorsal aspect of left foot-no surrounding erythema.   Superficial ulceration medial aspect of right leg with red granulation tissue-no purulent drainage/no surrounding erythema, muscle mass appropriate for age                            Skin:  Skin changes bilateral lower extremity as mentioned above. Linear ulceration lumbar region with dry necrotic tissue-no surrounding erythema, no purulent drainage; dry necrotic ulcer upper back; superficial ulcer right buttock- no drainage   Back: Stage IV decubitus pressure ulcer of the back-down to muscle fascia-wound looks clean out any signs of active infection   Psychiatric:  No suicidal or homicidal ideations, appropriate mood and affect         Labs: Results:   Chemistry Recent Labs     08/10/22  0233   *      K 4.8      CO2 30   BUN 36*   CREA 0.93   CA 9.3   AGAP 4   BUCR 39*   AP 99   TP 6.3*   ALB 2.4*   GLOB 3.9   AGRAT 0.6*      CBC w/Diff Recent Labs     08/10/22  0233   WBC 8.5   RBC 3.83*   HGB 11.5*   HCT 35.1      GRANS 51   LYMPH 36   EOS 0      Microbiology No results for input(s): CULT in the last 72 hours. RADIOLOGY:    All available imaging studies/reports in [facility] for this admission were reviewed    Total time spent >30 minutes. High complexity decision making was performed during the evaluation of this patient at high risk for decompensation     Above mentioned total time spent on reviewing the case/medical record/data/notes/EMR/patient examination/documentation/coordinating care with nurse/consultants, exclusive of procedures with complex decision making performed and > 50% time spent in face to face evaluation. Disclaimer: Sections of this note are dictated utilizing voice recognition software, which may have resulted in some phonetic based errors in grammar and contents. Even though attempts were made to correct all the mistakes, some may have been missed, and remained in the body of the document. If questions arise, please contact our department.     Dr. Jennifer Cortez, Infectious Disease Specialist    August 12, 2022

## 2022-08-12 NOTE — PROGRESS NOTES
Problem: Mobility Impaired (Adult and Pediatric)  Goal: *Acute Goals and Plan of Care (Insert Text)  Description: Physical Therapy Goals  Initiated 8/2/2022, re-evaluated 8/9/22 and goals adjusted and to be accomplished within 7 day(s)  1. Patient will move from supine to sit and sit to supine , scoot up and down, and roll side to side in bed with minimal assistance/contact guard assist.    2.  Patient will transfer from bed to chair and chair to bed with SBA using the least restrictive device. 3.  Patient will perform sit to stand with CGA  4. Patient will ambulate with minimal assistance/contact guard assist for 25 feet with the least restrictive device. 5.  Patient will ascend/descend stairs  as needed. 6.  Patient will perform BLE therex as prescribed    PLOF: Pt lives alone, reports she has a rollator, receives some assist from brothers      Outcome: Progressing Towards Goal   PHYSICAL THERAPY TREATMENT    Patient: Rock Kohler (1 y.o. female)  Date: 8/12/2022  Diagnosis: Sepsis ([address] [A41.9]  Leukocytosis [D72.829]  Open wound of lower back [S31.000A] Severe sepsis ([address]      Precautions: Fall, Skin  PLOF: No change in status    ASSESSMENT:  Pt found lying supine in bed upon arrival, pleasant and agreeable to skilled PT services; nursing cleared. Patient carried out bed mobility requiring increase in time and effort to maneuver LEs to EOB-- declining assistance from LPTA-- patient took up to 5 min to carryout task; Patient requires min A for trunk control in order to ensure correct COG to prevent posterior leaning/LOB-- once sitting EOB, patient with overall good static sitting balance (up to 10 minutes before returning to bed)-- requiring min A to LEs for sit to supine; max A for repositioning of self towards head of bed;  Pt performed seated LAQs with 3 sec ct and HS curls c 3 sec ct (resistance provided from LPTA) 1x10 ea- to increase strength, ROM and tolerance to activity in prep for improved levels of functional mobility. Pt remained in bed at conclusion of session with all needs met and call bell within reach. Progression toward goals:   []      Improving appropriately and progressing toward goals  [x]      Improving slowly and progressing toward goals  []      Not making progress toward goals and plan of care will be adjusted     PLAN:  Patient continues to benefit from skilled intervention to address the above impairments. Continue treatment per established plan of care. Discharge Recommendations:  Skilled Nursing Facility  Further Equipment Recommendations for Discharge:  walker and N/A     SUBJECTIVE:   Patient stated I have to take my time with things.     OBJECTIVE DATA SUMMARY:   Critical Behavior:  Neurologic State: Alert  Orientation Level: Oriented X4  Cognition: Follows commands  Safety/Judgement: Fall prevention  Functional Mobility Training:  Bed Mobility:  Supine to Sit: Minimum assistance  Sit to Supine: Minimum assistance  Scooting: Minimum assistance    Balance:  Sitting: Intact  Sitting - Static: Good (unsupported)  Sitting - Dynamic: Fair (occasional)     Therapeutic Exercises:   See above for exercises completed        Pain:  Pain level pre-treatment: 0/10  Pain level post-treatment: 0/10   Activity Tolerance:   Pt responded well to tx session as evident by no signs or reports of adverse reaction to EOB sitting  Please refer to the flowsheet for vital signs taken during this treatment. After treatment:   [] Patient left in no apparent distress sitting up in chair  [x] Patient left in no apparent distress in bed  [x] Call bell left within reach  [] Nursing notified  [] Caregiver present  [x] Bed alarm activated  [x] SCDs applied      COMMUNICATION/EDUCATION:   [x]         Role of Physical Therapy in the acute care setting. [x]         Fall prevention education was provided and the patient/caregiver indicated understanding.   [x]         Patient/family have participated as able in working toward goals and plan of care. []         Patient/family agree to work toward stated goals and plan of care. []         Patient understands intent and goals of therapy, but is neutral about his/her participation. []         Patient is unable to participate in stated goals/plan of care: ongoing with therapy staff.  []         Other:        Bonita Serrano.  Rigoberto Hidalgo, JANA   Time Calculation: 25 mins

## 2022-08-12 NOTE — PROGRESS NOTES
Physician Progress Note      PATIENTRiesa Hammans  [facility] #:                  [id number]  :                       1955  ADMIT DATE:       2022 1:32 PM  [address] [place] DATE:  RESPONDING  PROVIDER #:        JOSE JUAN JOHNSON DO          QUERY TEXT:    Patient admitted with Severe Sepsis. Per Progress note dated  documentation of debridement. To accurately reflect the procedure performed please document if debridement was excisional or nonexcisional and the deepest depth of tissue removed as down to and including: The medical record reflects the following:  Risk Factors: 75 y/o, quadriplegia, brain mass, sepsis  paralumbar region abscess    Clinical Indicators:  Per Progress note dated :  \"-wound looks much better today and additional debridement of necrotic tissue was performed with more purulent drainage noted- bone and fascia exposed\"    Treatment: Bedside debridement    Thank you,  Ada JACKN, RN, CRCR  Please contact me for any questions or concerns regarding this query at [email or web address]  Options provided:  -- Nonexcisional debridement of skin  -- Excisional debridement of skin  -- Nonexcisional debridement of subcutaneous tissue  -- Excisional debridement of subcutaneous tissue  -- Nonexcisional debridement of fascia  -- Excisional debridement of fascia  -- Nonexcisional debridement of muscle  -- Excisional debridement of muscle  -- Nonexcisional debridement of bone  -- Excisional debridement of bone  -- Other - I will add my own diagnosis  -- Disagree - Not applicable / Not valid  -- Disagree - Clinically unable to determine / Unknown  -- Refer to Clinical Documentation Reviewer    PROVIDER RESPONSE TEXT:    Non-excisional debridement of fascia of lower back was performed during procedure on . Query created by: Nicolas Barksdale on 2022 4:29 PM      QUERY TEXT:    Patient admitted with Severe Sepsis.  Per Progress note dated  documentation of initial bedside debridement. To accurately reflect the procedure performed please document if debridement was excisional or nonexcisional and the deepest depth of tissue removed as down to and including: The medical record reflects the following:  Risk Factors: 73 y/o, quadriplegia, brain mass, sepsis  paralumbar region abscess    Clinical Indicators:  Per Progress note dated 8/5:  -will continue to follow daily with bedside debridements to assist with clearing infection of necrotic tissue  -Sky pus, necrotic muscle, skin, subcutaneous tissue to bone entire lower pressure wound- pictures under media    Treatment: Bedside debridement    Thank you,  Smita JACKN, RN, CRCR  Please contact me for any questions or concerns regarding this query at [email or web address]  Options provided:  -- Nonexcisional debridement of skin  -- Excisional debridement of skin  -- Nonexcisional debridement of subcutaneous tissue  -- Excisional debridement of subcutaneous tissue  -- Nonexcisional debridement of fascia  -- Excisional debridement of fascia  -- Nonexcisional debridement of muscle  -- Excisional debridement of muscle  -- Nonexcisional debridement of bone  -- Excisional debridement of bone  -- Other - I will add my own diagnosis  -- Disagree - Not applicable / Not valid  -- Disagree - Clinically unable to determine / Unknown  -- Refer to Clinical Documentation Reviewer    PROVIDER RESPONSE TEXT:    Excisional debridement of fascia of lower back was performed during procedure on 8/5.     Query created by: Lei Gonzalez on 8/11/2022 11:24 AM      Electronically signed by:  Jojo Navarro DO 8/12/2022 8:43 AM

## 2022-08-12 NOTE — ROUTINE PROCESS
0715- Bedside, Verbal and Written shift change report received by Willard Mercer (oncoming nurse) by Rossana(offgoing nurse). Allergy band placed on pt's wrist. Report included the following information SBAR, Kardex, Intake/Output, MAR and Recent Results. 0951-Assessment completed, call bell within reach, no distress noted. AM  medications administered, pt tolerated with ease, will continue to monitor. 1200- Shift reassessment, pt condition unchanged, will continue to monitor. 1600-  Shift reassessment, pt condition unchanged, will continue to monitor. 1920- Bedside, Verbal and Written shift change report given to Pat(oncoming nurse) by Willard Mercer (offgoing nurse). Report included the following information SBAR, Kardex, Intake/Output, MAR and Recent Results. Skin assessment completed.

## 2022-08-12 NOTE — PROGRESS NOTES
Patient receiving nursing wound care, pt refused to participate in OT intervention this date. OT to follow.

## 2022-08-12 NOTE — PROGRESS NOTES
Bedside Verbal report given to [facility] NICOLE(RN Oncoming nurse) by Jan Ferrer O.RN(offgoing nurse).  Report included SBAR, MAR and Recent Report

## 2022-08-13 PROCEDURE — 74011000250 HC RX REV CODE- 250: Performed by: INTERNAL MEDICINE

## 2022-08-13 PROCEDURE — 74011250636 HC RX REV CODE- 250/636: Performed by: INTERNAL MEDICINE

## 2022-08-13 PROCEDURE — 74011250637 HC RX REV CODE- 250/637: Performed by: HOSPITALIST

## 2022-08-13 PROCEDURE — 65270000046 HC RM TELEMETRY

## 2022-08-13 PROCEDURE — 74011000258 HC RX REV CODE- 258: Performed by: INTERNAL MEDICINE

## 2022-08-13 PROCEDURE — 74011250637 HC RX REV CODE- 250/637: Performed by: INTERNAL MEDICINE

## 2022-08-13 PROCEDURE — 99232 SBSQ HOSP IP/OBS MODERATE 35: CPT | Performed by: INTERNAL MEDICINE

## 2022-08-13 RX ADMIN — SODIUM CHLORIDE, PRESERVATIVE FREE 10 ML: 5 INJECTION INTRAVENOUS at 13:25

## 2022-08-13 RX ADMIN — MEROPENEM 1 G: 1 INJECTION INTRAVENOUS at 13:24

## 2022-08-13 RX ADMIN — SODIUM CHLORIDE, PRESERVATIVE FREE 10 ML: 5 INJECTION INTRAVENOUS at 22:27

## 2022-08-13 RX ADMIN — AMLODIPINE BESYLATE 10 MG: 10 TABLET ORAL at 09:22

## 2022-08-13 RX ADMIN — SODIUM CHLORIDE, PRESERVATIVE FREE 10 ML: 5 INJECTION INTRAVENOUS at 06:03

## 2022-08-13 RX ADMIN — DIVALPROEX SODIUM 500 MG: 500 TABLET, EXTENDED RELEASE ORAL at 17:49

## 2022-08-13 RX ADMIN — LEVOTHYROXINE SODIUM 88 MCG: 88 TABLET ORAL at 06:00

## 2022-08-13 RX ADMIN — PYRIDOXINE HCL TAB 50 MG 200 MG: 50 TAB at 09:21

## 2022-08-13 RX ADMIN — MEROPENEM 1 G: 1 INJECTION INTRAVENOUS at 05:58

## 2022-08-13 RX ADMIN — DEXAMETHASONE 2 MG: 2 TABLET ORAL at 22:26

## 2022-08-13 RX ADMIN — DAKIN'S SOLUTION 0.125% (QUARTER STRENGTH): 0.12 SOLUTION at 22:28

## 2022-08-13 RX ADMIN — ATENOLOL 100 MG: 50 TABLET ORAL at 09:23

## 2022-08-13 RX ADMIN — Medication 5 MG: at 22:26

## 2022-08-13 RX ADMIN — DEXAMETHASONE 2 MG: 2 TABLET ORAL at 09:23

## 2022-08-13 RX ADMIN — MEROPENEM 1 G: 1 INJECTION INTRAVENOUS at 22:27

## 2022-08-13 RX ADMIN — HYDRALAZINE HYDROCHLORIDE 25 MG: 50 TABLET, FILM COATED ORAL at 17:49

## 2022-08-13 RX ADMIN — DIVALPROEX SODIUM 500 MG: 500 TABLET, EXTENDED RELEASE ORAL at 09:23

## 2022-08-13 RX ADMIN — GABAPENTIN 300 MG: 300 CAPSULE ORAL at 17:49

## 2022-08-13 RX ADMIN — Medication 2500 MCG: at 09:21

## 2022-08-13 RX ADMIN — HYDRALAZINE HYDROCHLORIDE 25 MG: 50 TABLET, FILM COATED ORAL at 22:26

## 2022-08-13 RX ADMIN — GABAPENTIN 300 MG: 300 CAPSULE ORAL at 09:22

## 2022-08-13 RX ADMIN — HYDRALAZINE HYDROCHLORIDE 25 MG: 50 TABLET, FILM COATED ORAL at 09:23

## 2022-08-13 RX ADMIN — Medication 5 MG: at 01:37

## 2022-08-13 NOTE — PROGRESS NOTES
[facility]  Progress Note    Patient: Angela Butt Age: 74 y.o. : 1955 MR#: [id number] SSN: [id number]  Date/Time: 2022     Subjective:   Pt has no complaints. Seen with physical therapist present in room and CM. Assessment/Plan:   74year old female admitted after presenting to [facility] ED w/ c/o left foot blister and left ankle pain. Also had findings of back wound that was malodorous and was admitted for management of severe sepsis secondary to lower back wound and draining left ankle blister. Sepsis 2y to Left foot blister, left ankle pain with chronic wounds on her lower back. CT abdomen and pelvis soft tissue stranding and ill-defined fluid collection along the right paralumbar region. S/p I&D with juanjose pus, necrotic muscle skin and subcutaneous tissue to bone. Continues broad-spectrum IV antibiotics, currently on Meropenem    Concern raised by ID specialist regarding interaction between Meropenem and Depakote. Discussed w/ pharmacist, level of Depakote might be lower in setting of concomitant Meropenem use, and increase risk of seizure episode. Neurology consulted for guidance on AED choice. Discussed w/ neurologist, Ran Nephew made to double dose of depakote while on Meropenem. Appreciate assistance. Depakote dose doubled on 22. S/p PICC line placement by IR on 22. Bacteremia with wound cultures growing Klebsiella and Proteus mirabilis- continues on IV antibiotics, will defer duration and choice of ABx to infectious disease. Brain mass-patient currently has a neurosurgical appointment, was supposed to undergo biopsy for possible glioblastoma however it is currently pending. Continue dexamethasone 2 mg twice daily. Per patient's brothers she has been given timeframe of approximately 9 months to live.   History of hypertension - home medications resumed, monitor blood pressure  History of hypothyroidism-continue Synthroid  History of seizure disorder-continue gabapentin,Depakote dose doubled, please see above. History of breast cancer-continue tamoxifen  DVT prophylaxis-Lovenox  Full code        DISPO: discussed during IDR. Pt at this point has declined SNF and wants to go home, however, home health for home IV ABx via PICC line not possible, per CM team. Per psych consultant, pt able to make medical decisions at this time.  Spoke with pt along w/ CM and PT and had extensive discussion about disposition. Initially, pt responded that she wanted to be discharged to home despite being told that going home is not advised as pt may not be able to get the abx for treatment of her extensive soft tissue infection and w/o abx that she had high chance of adverse outcome including worsening infection and death. Later on she stated that she would like to think about it and seemed to be open to the idea of going through the facilities to choose the  SNF that would best suite her needs. The meeting was concluded w/ plans for CM to provide the pt with a list of facilities.  Will cont to work w/ CM for safe d/c.      Chencho Wyatt MD  22      Case discussed with:  [x]Patient  []Family  [x]Nursing  [x]Case Management  DVT Prophylaxis:  [x]Lovenox  []Hep SQ  []SCDs  []Coumadin   []On Heparin gtt    Objective:   VS: Visit Vitals  /70   Pulse 69   Temp 98.6 °F (37 °C)   Resp 18   Ht 5' 3\" (1.6 m)   Wt 83.1 kg (183 lb 3.2 oz)   SpO2 98%   BMI 32.45 kg/m²        Tmax/24hrs: Temp (24hrs), Av.6 °F (37 °C), Min:97.9 °F (36.6 °C), Max:99.3 °F (37.4 °C)  IOBRIEF  Intake/Output Summary (Last 24 hours) at 2022 2130  Last data filed at 2022 5632  Gross per 24 hour   Intake 50 ml   Output 1300 ml   Net -1250 ml       General:  Alert, non cooperative, no acute distress, blind in right eye  COR: no jvd, no peripheral edema  LUNGS: w/o increased wob  Neuro: no facial asymmetry, speech wnl  PSYCH: has appropriate affect, non agitated      Medications:   Current Facility-Administered Medications   Medication Dose Route Frequency    divalproex ER (DEPAKOTE ER) 24 hour tablet 500 mg  500 mg Oral BID    meropenem (MERREM) 1 g in 0.9% sodium chloride (MBP/ADV) 50 mL MBP  1 g IntraVENous Q8H    [Held by provider] collagenase (SANTYL) 250 unit/gram ointment   Topical BID    sodium hypochlorite (QUARTER STRENGTH DAKIN'S) 0.125% irrigation (bottle)   Topical BID    melatonin (rapid dissolve) tablet 5 mg  5 mg Oral QHS PRN    sodium chloride (NS) flush 5-40 mL  5-40 mL IntraVENous Q8H    sodium chloride (NS) flush 5-40 mL  5-40 mL IntraVENous PRN    acetaminophen (TYLENOL) tablet 650 mg  650 mg Oral Q6H PRN    Or    acetaminophen (TYLENOL) suppository 650 mg  650 mg Rectal Q6H PRN    polyethylene glycol (MIRALAX) packet 17 g  17 g Oral DAILY PRN    ondansetron (ZOFRAN ODT) tablet 4 mg  4 mg Oral Q8H PRN    Or    ondansetron (ZOFRAN) injection 4 mg  4 mg IntraVENous Q6H PRN    albuterol-ipratropium (DUO-NEB) 2.5 MG-0.5 MG/3 ML  3 mL Nebulization Q6H PRN    nitroglycerin (NITROBID) 2 % ointment 0.5 Inch  0.5 Inch Topical Q6H PRN    naloxone (NARCAN) injection 0.4 mg  0.4 mg IntraVENous EVERY 2 MINUTES AS NEEDED    amLODIPine (NORVASC) tablet 10 mg  10 mg Oral DAILY    cyanocobalamin (VITAMIN B12) sublingual tablet 2,500 mcg  2,500 mcg Oral DAILY    dexAMETHasone (DECADRON) tablet 2 mg  2 mg Oral Q12H    gabapentin (NEURONTIN) capsule 300 mg  300 mg Oral BID    hydrALAZINE (APRESOLINE) tablet 25 mg  25 mg Oral TID    levothyroxine (SYNTHROID) tablet 88 mcg  88 mcg Oral 6am    atenoloL (TENORMIN) tablet 100 mg  100 mg Oral DAILY    oxyCODONE-acetaminophen (PERCOCET) 5-325 mg per tablet 1 Tablet  1 Tablet Oral Q4H PRN    pyridoxine (vitamin B6) (VITAMIN B-6) tablet 200 mg  200 mg Oral DAILY       Imaging:   XR Results (most recent):  Results from Hospital Encounter encounter on 08/01/22    XR CHEST PORT    Narrative  EXAM: XR CHEST PORT    CLINICAL INDICATION/HISTORY: 74 years Female. chest pain. Additional History: None    TECHNIQUE: Frontal view of the chest    COMPARISON: Chest radiograph 3/2/2020    FINDINGS:    The cardiac silhouette appears within normal limits. Tortuosity of the thoracic  aorta, unchanged in appearance. Pulmonary vasculature appears within normal  limits. No confluent airspace opacity is appreciated. No definite evidence of  pleural effusion or pneumothorax. No acute osseous abnormality appreciated. Impression  No radiographic evidence of acute cardiopulmonary process. CT Results (most recent):  Results from Hospital Encounter encounter on 08/01/22    CT ABD PELV W CONT    Narrative  CT Abdomen And Pelvis with Intravenous Contrast    INDICATION: Lumbar soft tissue abscess. TECHNIQUE: 5 mm collimation axial images obtained from the diaphragm to the  level of the pubic symphysis following the uneventful administration of  100 cc  of low osmolar, nonionic intravenous contrast.    Dose reduction techniques used: Automated exposure control, adjustment of the  mAs and/or kVp according to patient size, standardized low-dose protocol, and/or  iterative reconstruction technique. COMPARISON: None. ABDOMEN FINDINGS:    Lung Bases: Bibasilar atelectasis. Small pleural effusions. Liver: Normal attenuation. No evidence for mass. Gallbladder: Cholelithiasis. No biliary ductal dilatation. Pancreas: Normal attenuation without mass or ductal dilatation. Spleen: Normal in size. No evidence of mass. .    Adrenal Glands: No evidence for mass. Kidneys:    Right: Normal enhancement. No cortical mass. No hydronephrosis. Left:  Normal enhancement. Left renal cyst.  No hydronephrosis. Lymph Nodes: Upper abdominal lymph nodes. There is a 1.3 cm lymph node in the  jesse hepatis. Aorta:   Normal in caliber    PELVIS FINDINGS:    Bowel: Small Bowel: Normal in caliber with normal wall thickness.   Large Bowel: Normal in caliber with normal wall thickness. Moderate stool  burden. Appendix: Normal appendix. Bladder: Underdistended. The uterus is surgically absent. There is no suspicious adnexal mass. There is no free air. There is a small volume of ascites. There is soft tissue stranding and some ill-defined fluid in the deep  subcutaneous tissues which measures approximately 4.8 x 3.1 cm. This is in the  right lateral lumbar region. Bones: No acute finding. Impression  Soft tissue stranding and ill-defined fluid in the right paralumbar region  measuring 4.8 x 3.1 cm. Small volume of ascites in the abdomen and pelvis. No definite acute bowel abnormality. Moderate stool burden. Additional incidentals as above. MRI Results (most recent):  Results from [place] encounter on 03/31/14    MRI BREAST BX VAC ASSIST RT    Addendum 4/3/2014  4:54 PM  Addendum: Pathology changes from right breast biopsy 12:00 position-  Fibrocystic changes with florid ductal epithelial hyperplasia. The findings are benign and concordant. Narrative  MR guided left breast biopsy    HISTORY: Left breast cancer, abnormal MRI    COMPARISON: MRI March 2014    FINDINGS:    Informed consent was obtained. The risks of the procedure including but not  limited to bleeding and infection were reviewed with the patient who wished to  proceed. Preliminary pre and post contrast images were performed. 20cc  Magnevist IV was administered. With the patient positioned prone, and following placement in the compression  device, the left breast was imaged, and the lesion in the 12:00 left breast was  targeted. Appropriate coordinates records were obtained. The skin was  cleansed and local anesthesia administered. After making a small incision in  the skin with a # 11 scalpel blade, the trocar and biopsy sheath were advanced  to the appropriate depth.   After confirmation of appropriate positioning the 9  gauge 20 mm vacuum assisted biopsy needle was advanced to the appropriate  depth. Subsequently a series of approximately 12 vacuum assisted samples were  acquired in a radial fashion. Prior to removal of the sheath, using coaxial technique, a [employer]  cylinder shaped clip was placed to oscar area sampled. The patient was removed from the biopsy device, the wound was dressed and post  biopsy instructions were given to the patient. The samples were sent to the  laboratory for analysis. Diagnostic Mammogram left Breast: CC and MLO views were obtained of the left  breast and document the clip at the site of sampling in the 12:00 left breast.    Complications: No complications. Specimen: 12 vacuum assisted samples  Assistant: None. EBL: 5 cc    Impression  :    Status post MRI guided biopsy of an abnormal area of enhancement in the 12:00  left breast.  Area sampled marked with a     clip. Samples sent to the  laboratory for analysis. Labs:    Recent Results (from the past 48 hour(s))   VALPROIC ACID    Collection Time: 08/11/22  1:18 AM   Result Value Ref Range    Valproic acid 7 (L) 50 - 100 ug/ml       Signed By: Chencho Wyatt MD     August 12, 2022      I spent 25 minutes with the patient in face-to-face consultation, of which greater than 50% was spent in counseling and coordination of care as described above    Disclaimer: Sections of this note are dictated using utilizing voice recognition software. Minor typographical errors may be present. If questions arise, please do not hesitate to contact me or call our department.

## 2022-08-13 NOTE — ROUTINE PROCESS
0730- Bedside, Verbal and Written shift change report received by Avis Molina (oncoming nurse) by Rossana(offgoing nurse). Report included the following information SBAR, Kardex, Intake/Output, MAR and Recent Results. 0921-Assessment completed, call bell within reach, no distress noted. AM  medications administered, pt tolerated with ease, will continue to monitor. 1200-- Shift reassessment, pt condition unchanged, will continue to monitor. 1600-  Shift reassessment, pt condition unchanged, will continue to monitor. 1915- Bedside, Verbal and Written shift change report given to Kathy(oncoming nurse) by Avis Molina (offgoing nurse). Report included the following information SBAR, Kardex, Intake/Output, MAR and Recent Results. Skin assessment completed.

## 2022-08-14 PROCEDURE — 74011250636 HC RX REV CODE- 250/636: Performed by: INTERNAL MEDICINE

## 2022-08-14 PROCEDURE — 74011250637 HC RX REV CODE- 250/637: Performed by: INTERNAL MEDICINE

## 2022-08-14 PROCEDURE — 99232 SBSQ HOSP IP/OBS MODERATE 35: CPT | Performed by: INTERNAL MEDICINE

## 2022-08-14 PROCEDURE — 77030038269 HC DRN EXT URIN PURWCK BARD -A

## 2022-08-14 PROCEDURE — 2709999900 HC NON-CHARGEABLE SUPPLY

## 2022-08-14 PROCEDURE — 74011000258 HC RX REV CODE- 258: Performed by: INTERNAL MEDICINE

## 2022-08-14 PROCEDURE — 74011000250 HC RX REV CODE- 250: Performed by: INTERNAL MEDICINE

## 2022-08-14 PROCEDURE — 65270000046 HC RM TELEMETRY

## 2022-08-14 PROCEDURE — 74011250637 HC RX REV CODE- 250/637: Performed by: HOSPITALIST

## 2022-08-14 RX ADMIN — SODIUM CHLORIDE, PRESERVATIVE FREE 10 ML: 5 INJECTION INTRAVENOUS at 06:30

## 2022-08-14 RX ADMIN — DEXAMETHASONE 2 MG: 2 TABLET ORAL at 23:04

## 2022-08-14 RX ADMIN — DEXAMETHASONE 2 MG: 2 TABLET ORAL at 09:57

## 2022-08-14 RX ADMIN — OXYCODONE HYDROCHLORIDE AND ACETAMINOPHEN 1 TABLET: 5; 325 TABLET ORAL at 09:55

## 2022-08-14 RX ADMIN — DIVALPROEX SODIUM 500 MG: 500 TABLET, EXTENDED RELEASE ORAL at 17:19

## 2022-08-14 RX ADMIN — GABAPENTIN 300 MG: 300 CAPSULE ORAL at 09:57

## 2022-08-14 RX ADMIN — Medication 2500 MCG: at 09:55

## 2022-08-14 RX ADMIN — MEROPENEM 1 G: 1 INJECTION INTRAVENOUS at 23:06

## 2022-08-14 RX ADMIN — MEROPENEM 1 G: 1 INJECTION INTRAVENOUS at 14:18

## 2022-08-14 RX ADMIN — HYDRALAZINE HYDROCHLORIDE 25 MG: 50 TABLET, FILM COATED ORAL at 23:04

## 2022-08-14 RX ADMIN — SODIUM CHLORIDE, PRESERVATIVE FREE 10 ML: 5 INJECTION INTRAVENOUS at 15:55

## 2022-08-14 RX ADMIN — ATENOLOL 100 MG: 50 TABLET ORAL at 09:56

## 2022-08-14 RX ADMIN — HYDRALAZINE HYDROCHLORIDE 25 MG: 50 TABLET, FILM COATED ORAL at 09:56

## 2022-08-14 RX ADMIN — HYDRALAZINE HYDROCHLORIDE 25 MG: 50 TABLET, FILM COATED ORAL at 17:19

## 2022-08-14 RX ADMIN — DAKIN'S SOLUTION 0.125% (QUARTER STRENGTH): 0.12 SOLUTION at 08:00

## 2022-08-14 RX ADMIN — MEROPENEM 1 G: 1 INJECTION INTRAVENOUS at 06:29

## 2022-08-14 RX ADMIN — LEVOTHYROXINE SODIUM 88 MCG: 88 TABLET ORAL at 06:29

## 2022-08-14 RX ADMIN — DIVALPROEX SODIUM 500 MG: 500 TABLET, EXTENDED RELEASE ORAL at 09:57

## 2022-08-14 RX ADMIN — AMLODIPINE BESYLATE 10 MG: 10 TABLET ORAL at 10:12

## 2022-08-14 RX ADMIN — PYRIDOXINE HCL TAB 50 MG 200 MG: 50 TAB at 09:57

## 2022-08-14 RX ADMIN — Medication 5 MG: at 23:04

## 2022-08-14 RX ADMIN — GABAPENTIN 300 MG: 300 CAPSULE ORAL at 17:19

## 2022-08-14 RX ADMIN — SODIUM CHLORIDE, PRESERVATIVE FREE 10 ML: 5 INJECTION INTRAVENOUS at 09:45

## 2022-08-14 NOTE — PROGRESS NOTES
[facility]  Progress Note    Patient: Homer Aquino Age: 74 y.o. : 1955 MR#: [id number] SSN: [id number]  Date/Time: 2022     Subjective:   Pt has no complaints. Assessment/Plan:   74year old female admitted after presenting to [facility] ED w/ c/o left foot blister and left ankle pain. Also had findings of back wound that was malodorous and was admitted for management of severe sepsis secondary to lower back wound and draining left ankle blister. Sepsis 2y to Left foot blister, left ankle pain with chronic wounds on her lower back. CT abdomen and pelvis soft tissue stranding and ill-defined fluid collection along the right paralumbar region. S/p I&D with juanjose pus, necrotic muscle skin and subcutaneous tissue to bone. Continues broad-spectrum IV antibiotics, currently on Meropenem    Concern raised by ID specialist regarding interaction between Meropenem and Depakote. Discussed w/ pharmacist, level of Depakote might be lower in setting of concomitant Meropenem use, and increase risk of seizure episode. Neurology consulted for guidance on AED choice. Discussed w/ neurologist, Rosio Duvall made to double dose of depakote while on Meropenem. Appreciate assistance. Depakote dose doubled on 22. S/p PICC line placement by IR on 22. Bacteremia with wound cultures growing Klebsiella and Proteus mirabilis- continues on IV antibiotics, will defer duration and choice of ABx to infectious disease. Brain mass-patient currently has a neurosurgical appointment, was supposed to undergo biopsy for possible glioblastoma however it is currently pending. Continue dexamethasone 2 mg twice daily. Per patient's brothers she has been given timeframe of approximately 9 months to live.   History of hypertension - home medications resumed, monitor blood pressure  History of hypothyroidism-continue Synthroid  History of seizure disorder-continue gabapentin,Depakote dose doubled, please see above. History of breast cancer-continue tamoxifen  DVT prophylaxis-Lovenox  Full code        DISPO: discussed during IDR. Pt at this point has declined SNF and wants to go home, however, home health for home IV ABx via PICC line not possible, per CM team. Per psych consultant, pt able to make medical decisions at this time.  Spoke with pt along w/ CM and PT and had extensive discussion about disposition. Initially, pt responded that she wanted to be discharged to home despite being told that going home is not advised as pt may not be able to get the abx for treatment of her extensive soft tissue infection and w/o abx that she had high chance of adverse outcome including worsening infection and death. Later on she stated that she would like to think about it and seemed to be open to the idea of going through the facilities to choose the  SNF that would best suite her needs. The meeting was concluded w/ plans for CM to provide the pt with a list of facilities. Will cont to work w/ CM for safe d/c.  : Pt states that she has been given a list of SNF's but thinks all are expensive, wants time to decide.       Drew Garcia MD  22      Case discussed with:  [x]Patient  []Family  [x]Nursing  [x]Case Management  DVT Prophylaxis:  [x]Lovenox  []Hep SQ  []SCDs  []Coumadin   []On Heparin gtt    Objective:   VS: Visit Vitals  /75   Pulse 73   Temp 98.3 °F (36.8 °C)   Resp 16   Ht 5' 3\" (1.6 m)   Wt 83.1 kg (183 lb 3.2 oz)   SpO2 97%   BMI 32.45 kg/m²        Tmax/24hrs: Temp (24hrs), Av.4 °F (36.9 °C), Min:98.1 °F (36.7 °C), Max:98.7 °F (37.1 °C)  IOBRIEF  Intake/Output Summary (Last 24 hours) at 2022 2352  Last data filed at 2022 1324  Gross per 24 hour   Intake 150 ml   Output --   Net 150 ml       General:  Alert, non cooperative, no acute distress, blind in right eye  COR: no jvd, no peripheral edema  LUNGS: w/o increased wob  Neuro: no facial asymmetry, speech wnl  PSYCH: has appropriate affect, non agitated      Medications:   Current Facility-Administered Medications   Medication Dose Route Frequency    divalproex ER (DEPAKOTE ER) 24 hour tablet 500 mg  500 mg Oral BID    meropenem (MERREM) 1 g in 0.9% sodium chloride (MBP/ADV) 50 mL MBP  1 g IntraVENous Q8H    [Held by provider] collagenase (SANTYL) 250 unit/gram ointment   Topical BID    sodium hypochlorite (QUARTER STRENGTH DAKIN'S) 0.125% irrigation (bottle)   Topical BID    melatonin (rapid dissolve) tablet 5 mg  5 mg Oral QHS PRN    sodium chloride (NS) flush 5-40 mL  5-40 mL IntraVENous Q8H    sodium chloride (NS) flush 5-40 mL  5-40 mL IntraVENous PRN    acetaminophen (TYLENOL) tablet 650 mg  650 mg Oral Q6H PRN    Or    acetaminophen (TYLENOL) suppository 650 mg  650 mg Rectal Q6H PRN    polyethylene glycol (MIRALAX) packet 17 g  17 g Oral DAILY PRN    ondansetron (ZOFRAN ODT) tablet 4 mg  4 mg Oral Q8H PRN    Or    ondansetron (ZOFRAN) injection 4 mg  4 mg IntraVENous Q6H PRN    albuterol-ipratropium (DUO-NEB) 2.5 MG-0.5 MG/3 ML  3 mL Nebulization Q6H PRN    nitroglycerin (NITROBID) 2 % ointment 0.5 Inch  0.5 Inch Topical Q6H PRN    naloxone (NARCAN) injection 0.4 mg  0.4 mg IntraVENous EVERY 2 MINUTES AS NEEDED    amLODIPine (NORVASC) tablet 10 mg  10 mg Oral DAILY    cyanocobalamin (VITAMIN B12) sublingual tablet 2,500 mcg  2,500 mcg Oral DAILY    dexAMETHasone (DECADRON) tablet 2 mg  2 mg Oral Q12H    gabapentin (NEURONTIN) capsule 300 mg  300 mg Oral BID    hydrALAZINE (APRESOLINE) tablet 25 mg  25 mg Oral TID    levothyroxine (SYNTHROID) tablet 88 mcg  88 mcg Oral 6am    atenoloL (TENORMIN) tablet 100 mg  100 mg Oral DAILY    oxyCODONE-acetaminophen (PERCOCET) 5-325 mg per tablet 1 Tablet  1 Tablet Oral Q4H PRN    pyridoxine (vitamin B6) (VITAMIN B-6) tablet 200 mg  200 mg Oral DAILY       Imaging:   XR Results (most recent):  Results from Hospital Encounter encounter on 08/01/22    XR CHEST PORT    Narrative  EXAM: XR CHEST PORT    CLINICAL INDICATION/HISTORY: 74 years Female. chest pain. Additional History: None    TECHNIQUE: Frontal view of the chest    COMPARISON: Chest radiograph 3/2/2020    FINDINGS:    The cardiac silhouette appears within normal limits. Tortuosity of the thoracic  aorta, unchanged in appearance. Pulmonary vasculature appears within normal  limits. No confluent airspace opacity is appreciated. No definite evidence of  pleural effusion or pneumothorax. No acute osseous abnormality appreciated. Impression  No radiographic evidence of acute cardiopulmonary process. CT Results (most recent):  Results from Hospital Encounter encounter on 08/01/22    CT ABD PELV W CONT    Narrative  CT Abdomen And Pelvis with Intravenous Contrast    INDICATION: Lumbar soft tissue abscess. TECHNIQUE: 5 mm collimation axial images obtained from the diaphragm to the  level of the pubic symphysis following the uneventful administration of  100 cc  of low osmolar, nonionic intravenous contrast.    Dose reduction techniques used: Automated exposure control, adjustment of the  mAs and/or kVp according to patient size, standardized low-dose protocol, and/or  iterative reconstruction technique. COMPARISON: None. ABDOMEN FINDINGS:    Lung Bases: Bibasilar atelectasis. Small pleural effusions. Liver: Normal attenuation. No evidence for mass. Gallbladder: Cholelithiasis. No biliary ductal dilatation. Pancreas: Normal attenuation without mass or ductal dilatation. Spleen: Normal in size. No evidence of mass. .    Adrenal Glands: No evidence for mass. Kidneys:    Right: Normal enhancement. No cortical mass. No hydronephrosis. Left:  Normal enhancement. Left renal cyst.  No hydronephrosis. Lymph Nodes: Upper abdominal lymph nodes. There is a 1.3 cm lymph node in the  jesse hepatis.     Aorta:   Normal in caliber    PELVIS FINDINGS:    Bowel: Small Bowel: Normal in caliber with normal wall thickness. Large Bowel: Normal in caliber with normal wall thickness. Moderate stool  burden. Appendix: Normal appendix. Bladder: Underdistended. The uterus is surgically absent. There is no suspicious adnexal mass. There is no free air. There is a small volume of ascites. There is soft tissue stranding and some ill-defined fluid in the deep  subcutaneous tissues which measures approximately 4.8 x 3.1 cm. This is in the  right lateral lumbar region. Bones: No acute finding. Impression  Soft tissue stranding and ill-defined fluid in the right paralumbar region  measuring 4.8 x 3.1 cm. Small volume of ascites in the abdomen and pelvis. No definite acute bowel abnormality. Moderate stool burden. Additional incidentals as above. MRI Results (most recent):  Results from [place] encounter on 03/31/14    MRI BREAST BX VAC ASSIST RT    Addendum 4/3/2014  4:54 PM  Addendum: Pathology changes from right breast biopsy 12:00 position-  Fibrocystic changes with florid ductal epithelial hyperplasia. The findings are benign and concordant. Narrative  MR guided left breast biopsy    HISTORY: Left breast cancer, abnormal MRI    COMPARISON: MRI March 2014    FINDINGS:    Informed consent was obtained. The risks of the procedure including but not  limited to bleeding and infection were reviewed with the patient who wished to  proceed. Preliminary pre and post contrast images were performed. 20cc  Magnevist IV was administered. With the patient positioned prone, and following placement in the compression  device, the left breast was imaged, and the lesion in the 12:00 left breast was  targeted. Appropriate coordinates records were obtained. The skin was  cleansed and local anesthesia administered. After making a small incision in  the skin with a # 11 scalpel blade, the trocar and biopsy sheath were advanced  to the appropriate depth.   After confirmation of appropriate positioning the 9  gauge 20 mm vacuum assisted biopsy needle was advanced to the appropriate  depth. Subsequently a series of approximately 12 vacuum assisted samples were  acquired in a radial fashion. Prior to removal of the sheath, using coaxial technique, a [employer] trimark  cylinder shaped clip was placed to oscar area sampled. The patient was removed from the biopsy device, the wound was dressed and post  biopsy instructions were given to the patient. The samples were sent to the  laboratory for analysis. Diagnostic Mammogram left Breast: CC and MLO views were obtained of the left  breast and document the clip at the site of sampling in the 12:00 left breast.    Complications: No complications. Specimen: 12 vacuum assisted samples  Assistant: None. EBL: 5 cc    Impression  :    Status post MRI guided biopsy of an abnormal area of enhancement in the 12:00  left breast.  Area sampled marked with a     clip. Samples sent to the  laboratory for analysis. Labs:    No results found for this or any previous visit (from the past 48 hour(s)). Signed By: Chencho Wyatt MD     August 13, 2022      I spent 25 minutes with the patient in face-to-face consultation, of which greater than 50% was spent in counseling and coordination of care as described above    Disclaimer: Sections of this note are dictated using utilizing voice recognition software. Minor typographical errors may be present. If questions arise, please do not hesitate to contact me or call our department.

## 2022-08-14 NOTE — ROUTINE PROCESS
6929-- assumed care of patient bedside report - skin assessment -- dressings intact, dated and timed -- picc line assessed and infusing antibiotics--   2130-- patient turned, meds and snack given --   2330-- patient sleeping reusing to turn at this time -- she stated \"maybe next time\". 0130-patient sleeping refusing to turn \"not right now\"  0330-patient sleeping same again  0530- patient easily aroused for synthroid -- she wishes \"I will take it with my hot coffee this morning\"    0800--During bedside report Liliya Wyatt and this RN changed dressings and linens -- CHG wipes on patient --     Bedside and Verbal shift change report given to Liliya Wyatt (oncoming nurse) by Sonny Ag RN (offgoing nurse). Report given with SBAR, Kardex, Intake/Output, MAR, Accordion and Recent Results.

## 2022-08-14 NOTE — PROGRESS NOTES
[facility]    Patient: Ruth Henson Age: 74 y.o. : 1955 MR#: [id number] SSN: [id number]  Date/Time: 2022     Subjective:   Pt w/o complaints. Assessment/Plan:   74year old female admitted after presenting to [facility] ED w/ c/o left foot blister and left ankle pain. Also had findings of back wound that was malodorous and was admitted for management of severe sepsis secondary to lower back wound and draining left ankle blister. Sepsis 2y to Left foot blister, left ankle pain with chronic wounds on her lower back. CT abdomen and pelvis soft tissue stranding and ill-defined fluid collection along the right paralumbar region. S/p I&D with juanjose pus, necrotic muscle skin and subcutaneous tissue to bone. Continues broad-spectrum IV antibiotics, currently on Meropenem    Concern raised by ID specialist regarding interaction between Meropenem and Depakote. Discussed w/ pharmacist, level of Depakote might be lower in setting of concomitant Meropenem use, and increase risk of seizure episode. Neurology consulted for guidance on AED choice. Discussed w/ neurologist, Soni Booker made to double dose of depakote while on Meropenem. Appreciate assistance. Depakote dose doubled on 22. S/p PICC line placement by IR on 22. Bacteremia with wound cultures growing Klebsiella and Proteus mirabilis- continues on IV antibiotics, will defer duration and choice of ABx to infectious disease. Brain mass-patient currently has a neurosurgical appointment, was supposed to undergo biopsy for possible glioblastoma however it is currently pending. Continue dexamethasone 2 mg twice daily. Per patient's brothers she has been given timeframe of approximately 9 months to live.   History of hypertension - home medications resumed, monitor blood pressure  History of hypothyroidism-continue Synthroid  History of seizure disorder-continue gabapentin,Depakote dose doubled, please see above. History of breast cancer-continue tamoxifen  DVT prophylaxis-Lovenox  Full code        DISPO: discussed during IDR. Pt at this point has declined SNF and wants to go home, however, home health for home IV ABx via PICC line not possible, per CM team. Per psych consultant, pt able to make medical decisions at this time.  Spoke with pt along w/ CM and PT and had extensive discussion about disposition. Initially, pt responded that she wanted to be discharged to home despite being told that going home is not advised as pt may not be able to get the abx for treatment of her extensive soft tissue infection and w/o abx that she had high chance of adverse outcome including worsening infection and death. Later on she stated that she would like to think about it and seemed to be open to the idea of going through the facilities to choose the  SNF that would best suite her needs. The meeting was concluded w/ plans for CM to provide the pt with a list of facilities. Will cont to work w/ CM for safe d/c.  , : Pt states that she has been given a list of SNF's but thinks all are expensive, wants time to decide.       Shanel Millard MD  22      Case discussed with:  [x]Patient  []Family  [x]Nursing  [x]Case Management  DVT Prophylaxis:  [x]Lovenox  []Hep SQ  []SCDs  []Coumadin   []On Heparin gtt    Objective:   VS: Visit Vitals  /65   Pulse (!) 103   Temp 98 °F (36.7 °C)   Resp 18   Ht 5' 3\" (1.6 m)   Wt 83.1 kg (183 lb 3.2 oz)   SpO2 99%   BMI 32.45 kg/m²        Tmax/24hrs: Temp (24hrs), Av.2 °F (36.8 °C), Min:97.6 °F (36.4 °C), Max:98.7 °F (37.1 °C)  IOBRIEF  Intake/Output Summary (Last 24 hours) at 2022 1635  Last data filed at 2022 6286  Gross per 24 hour   Intake 480 ml   Output 1200 ml   Net -720 ml       General:  Alert, non cooperative, no acute distress, blind in right eye  COR: no jvd, no peripheral edema  LUNGS: w/o increased wob  Neuro: no facial asymmetry, speech wnl  PSYCH: has appropriate affect, non agitated      Medications:   Current Facility-Administered Medications   Medication Dose Route Frequency    divalproex ER (DEPAKOTE ER) 24 hour tablet 500 mg  500 mg Oral BID    meropenem (MERREM) 1 g in 0.9% sodium chloride (MBP/ADV) 50 mL MBP  1 g IntraVENous Q8H    [Held by provider] collagenase (SANTYL) 250 unit/gram ointment   Topical BID    sodium hypochlorite (QUARTER STRENGTH DAKIN'S) 0.125% irrigation (bottle)   Topical BID    melatonin (rapid dissolve) tablet 5 mg  5 mg Oral QHS PRN    sodium chloride (NS) flush 5-40 mL  5-40 mL IntraVENous Q8H    sodium chloride (NS) flush 5-40 mL  5-40 mL IntraVENous PRN    acetaminophen (TYLENOL) tablet 650 mg  650 mg Oral Q6H PRN    Or    acetaminophen (TYLENOL) suppository 650 mg  650 mg Rectal Q6H PRN    polyethylene glycol (MIRALAX) packet 17 g  17 g Oral DAILY PRN    ondansetron (ZOFRAN ODT) tablet 4 mg  4 mg Oral Q8H PRN    Or    ondansetron (ZOFRAN) injection 4 mg  4 mg IntraVENous Q6H PRN    albuterol-ipratropium (DUO-NEB) 2.5 MG-0.5 MG/3 ML  3 mL Nebulization Q6H PRN    nitroglycerin (NITROBID) 2 % ointment 0.5 Inch  0.5 Inch Topical Q6H PRN    naloxone (NARCAN) injection 0.4 mg  0.4 mg IntraVENous EVERY 2 MINUTES AS NEEDED    amLODIPine (NORVASC) tablet 10 mg  10 mg Oral DAILY    cyanocobalamin (VITAMIN B12) sublingual tablet 2,500 mcg  2,500 mcg Oral DAILY    dexAMETHasone (DECADRON) tablet 2 mg  2 mg Oral Q12H    gabapentin (NEURONTIN) capsule 300 mg  300 mg Oral BID    hydrALAZINE (APRESOLINE) tablet 25 mg  25 mg Oral TID    levothyroxine (SYNTHROID) tablet 88 mcg  88 mcg Oral 6am    atenoloL (TENORMIN) tablet 100 mg  100 mg Oral DAILY    oxyCODONE-acetaminophen (PERCOCET) 5-325 mg per tablet 1 Tablet  1 Tablet Oral Q4H PRN    pyridoxine (vitamin B6) (VITAMIN B-6) tablet 200 mg  200 mg Oral DAILY       Imaging:   XR Results (most recent):  Results from Hospital Encounter encounter on 08/01/22    XR CHEST PORT    Narrative  EXAM: XR CHEST PORT    CLINICAL INDICATION/HISTORY: 74 years Female. chest pain. Additional History: None    TECHNIQUE: Frontal view of the chest    COMPARISON: Chest radiograph 3/2/2020    FINDINGS:    The cardiac silhouette appears within normal limits. Tortuosity of the thoracic  aorta, unchanged in appearance. Pulmonary vasculature appears within normal  limits. No confluent airspace opacity is appreciated. No definite evidence of  pleural effusion or pneumothorax. No acute osseous abnormality appreciated. Impression  No radiographic evidence of acute cardiopulmonary process. CT Results (most recent):  Results from Hospital Encounter encounter on 08/01/22    CT ABD PELV W CONT    Narrative  CT Abdomen And Pelvis with Intravenous Contrast    INDICATION: Lumbar soft tissue abscess. TECHNIQUE: 5 mm collimation axial images obtained from the diaphragm to the  level of the pubic symphysis following the uneventful administration of  100 cc  of low osmolar, nonionic intravenous contrast.    Dose reduction techniques used: Automated exposure control, adjustment of the  mAs and/or kVp according to patient size, standardized low-dose protocol, and/or  iterative reconstruction technique. COMPARISON: None. ABDOMEN FINDINGS:    Lung Bases: Bibasilar atelectasis. Small pleural effusions. Liver: Normal attenuation. No evidence for mass. Gallbladder: Cholelithiasis. No biliary ductal dilatation. Pancreas: Normal attenuation without mass or ductal dilatation. Spleen: Normal in size. No evidence of mass. .    Adrenal Glands: No evidence for mass. Kidneys:    Right: Normal enhancement. No cortical mass. No hydronephrosis. Left:  Normal enhancement. Left renal cyst.  No hydronephrosis. Lymph Nodes: Upper abdominal lymph nodes. There is a 1.3 cm lymph node in the  jesse hepatis.     Aorta:   Normal in caliber    PELVIS FINDINGS:    Bowel: Small Bowel: Normal in caliber with normal wall thickness. Large Bowel: Normal in caliber with normal wall thickness. Moderate stool  burden. Appendix: Normal appendix. Bladder: Underdistended. The uterus is surgically absent. There is no suspicious adnexal mass. There is no free air. There is a small volume of ascites. There is soft tissue stranding and some ill-defined fluid in the deep  subcutaneous tissues which measures approximately 4.8 x 3.1 cm. This is in the  right lateral lumbar region. Bones: No acute finding. Impression  Soft tissue stranding and ill-defined fluid in the right paralumbar region  measuring 4.8 x 3.1 cm. Small volume of ascites in the abdomen and pelvis. No definite acute bowel abnormality. Moderate stool burden. Additional incidentals as above. MRI Results (most recent):  Results from [place] encounter on 03/31/14    MRI BREAST BX VAC ASSIST RT    Addendum 4/3/2014  4:54 PM  Addendum: Pathology changes from right breast biopsy 12:00 position-  Fibrocystic changes with florid ductal epithelial hyperplasia. The findings are benign and concordant. Narrative  MR guided left breast biopsy    HISTORY: Left breast cancer, abnormal MRI    COMPARISON: MRI March 2014    FINDINGS:    Informed consent was obtained. The risks of the procedure including but not  limited to bleeding and infection were reviewed with the patient who wished to  proceed. Preliminary pre and post contrast images were performed. 20cc  Magnevist IV was administered. With the patient positioned prone, and following placement in the compression  device, the left breast was imaged, and the lesion in the 12:00 left breast was  targeted. Appropriate coordinates records were obtained. The skin was  cleansed and local anesthesia administered. After making a small incision in  the skin with a # 11 scalpel blade, the trocar and biopsy sheath were advanced  to the appropriate depth.   After confirmation of appropriate positioning the 9  gauge 20 mm vacuum assisted biopsy needle was advanced to the appropriate  depth. Subsequently a series of approximately 12 vacuum assisted samples were  acquired in a radial fashion. Prior to removal of the sheath, using coaxial technique, a [employer] trimark  cylinder shaped clip was placed to oscar area sampled. The patient was removed from the biopsy device, the wound was dressed and post  biopsy instructions were given to the patient. The samples were sent to the  laboratory for analysis. Diagnostic Mammogram left Breast: CC and MLO views were obtained of the left  breast and document the clip at the site of sampling in the 12:00 left breast.    Complications: No complications. Specimen: 12 vacuum assisted samples  Assistant: None. EBL: 5 cc    Impression  :    Status post MRI guided biopsy of an abnormal area of enhancement in the 12:00  left breast.  Area sampled marked with a     clip. Samples sent to the  laboratory for analysis. Labs:    No results found for this or any previous visit (from the past 48 hour(s)). Signed By: Penelope Ceja MD     August 14, 2022      I spent 25 minutes with the patient in face-to-face consultation, of which greater than 50% was spent in counseling and coordination of care as described above    Disclaimer: Sections of this note are dictated using utilizing voice recognition software. Minor typographical errors may be present. If questions arise, please do not hesitate to contact me or call our department.

## 2022-08-14 NOTE — PROGRESS NOTES
Problem: Falls - Risk of  Goal: *Absence of Falls  Description: Document Lou Romo Fall Risk and appropriate interventions in the flowsheet. Outcome: Progressing Towards Goal  Note: Fall Risk Interventions:  Mobility Interventions: Patient to call before getting OOB    Mentation Interventions: Adequate sleep, hydration, pain control, Room close to nurse's station, Update white board    Medication Interventions: Assess postural VS orthostatic hypotension    Elimination Interventions: Call light in reach, Patient to call for help with toileting needs    History of Falls Interventions: Door open when patient unattended, Room close to nurse's station         Problem: Patient Education: Go to Patient Education Activity  Goal: Patient/Family Education  Outcome: Progressing Towards Goal     Problem: Pressure Injury - Risk of  Goal: *Prevention of pressure injury  Description: Document Bret Scale and appropriate interventions in the flowsheet.   Outcome: Progressing Towards Goal  Note: Pressure Injury Interventions:  Sensory Interventions: Assess changes in LOC, Float heels, Keep linens dry and wrinkle-free, Minimize linen layers    Moisture Interventions: Absorbent underpads, Assess need for specialty bed, Internal/External urinary devices    Activity Interventions: PT/OT evaluation, Assess need for specialty bed    Mobility Interventions: PT/OT evaluation    Nutrition Interventions: Document food/fluid/supplement intake    Friction and Shear Interventions: Apply protective barrier, creams and emollients, Foam dressings/transparent film/skin sealants, Lift team/patient mobility team, Minimize layers                Problem: Patient Education: Go to Patient Education Activity  Goal: Patient/Family Education  Outcome: Progressing Towards Goal     Problem: Patient Education: Go to Patient Education Activity  Goal: Patient/Family Education  Outcome: Progressing Towards Goal     Problem: Pain  Goal: *Control of Pain  Outcome: Progressing Towards Goal     Problem: Patient Education: Go to Patient Education Activity  Goal: Patient/Family Education  Outcome: Progressing Towards Goal     Problem: General Medical Care Plan  Goal: *Vital signs within specified parameters  Outcome: Progressing Towards Goal  Goal: *Labs within defined limits  Outcome: Progressing Towards Goal  Goal: *Absence of infection signs and symptoms  Outcome: Progressing Towards Goal  Goal: *Optimal pain control at patient's stated goal  Outcome: Progressing Towards Goal  Goal: *Skin integrity maintained  Outcome: Progressing Towards Goal  Goal: *Fluid volume balance  Outcome: Progressing Towards Goal  Goal: *Optimize nutritional status  Outcome: Progressing Towards Goal  Goal: *Anxiety reduced or absent  Outcome: Progressing Towards Goal  Goal: *Progressive mobility and function (eg: ADL's)  Outcome: Progressing Towards Goal     Problem: Patient Education: Go to Patient Education Activity  Goal: Patient/Family Education  Outcome: Progressing Towards Goal     Problem: General Wound Care  Goal: *Infected Wound: Prevention of further infection and promotion of healing  Description: Infection control procedures (eg: clean dressings, clean gloves, hand washing, precautions to isolate wound from contamination, sterile instruments used for wound debridement) should be implemented.   Outcome: Progressing Towards Goal  Goal: Interventions  Outcome: Progressing Towards Goal     Problem: Patient Education: Go to Patient Education Activity  Goal: Patient/Family Education  Outcome: Progressing Towards Goal

## 2022-08-15 PROCEDURE — 77030038269 HC DRN EXT URIN PURWCK BARD -A

## 2022-08-15 PROCEDURE — 65270000046 HC RM TELEMETRY

## 2022-08-15 PROCEDURE — 2709999900 HC NON-CHARGEABLE SUPPLY

## 2022-08-15 PROCEDURE — 74011000258 HC RX REV CODE- 258: Performed by: INTERNAL MEDICINE

## 2022-08-15 PROCEDURE — 74011000250 HC RX REV CODE- 250: Performed by: INTERNAL MEDICINE

## 2022-08-15 PROCEDURE — 97535 SELF CARE MNGMENT TRAINING: CPT

## 2022-08-15 PROCEDURE — 74011250636 HC RX REV CODE- 250/636: Performed by: INTERNAL MEDICINE

## 2022-08-15 PROCEDURE — 74011250637 HC RX REV CODE- 250/637: Performed by: INTERNAL MEDICINE

## 2022-08-15 PROCEDURE — 97116 GAIT TRAINING THERAPY: CPT

## 2022-08-15 PROCEDURE — 99232 SBSQ HOSP IP/OBS MODERATE 35: CPT | Performed by: HOSPITALIST

## 2022-08-15 PROCEDURE — 97530 THERAPEUTIC ACTIVITIES: CPT

## 2022-08-15 PROCEDURE — 74011250637 HC RX REV CODE- 250/637: Performed by: HOSPITALIST

## 2022-08-15 RX ADMIN — OXYCODONE HYDROCHLORIDE AND ACETAMINOPHEN 1 TABLET: 5; 325 TABLET ORAL at 21:43

## 2022-08-15 RX ADMIN — SODIUM CHLORIDE, PRESERVATIVE FREE 10 ML: 5 INJECTION INTRAVENOUS at 21:57

## 2022-08-15 RX ADMIN — SODIUM CHLORIDE, PRESERVATIVE FREE 10 ML: 5 INJECTION INTRAVENOUS at 16:07

## 2022-08-15 RX ADMIN — LEVOTHYROXINE SODIUM 88 MCG: 88 TABLET ORAL at 05:59

## 2022-08-15 RX ADMIN — DIVALPROEX SODIUM 500 MG: 500 TABLET, EXTENDED RELEASE ORAL at 08:43

## 2022-08-15 RX ADMIN — AMLODIPINE BESYLATE 10 MG: 10 TABLET ORAL at 08:44

## 2022-08-15 RX ADMIN — DEXAMETHASONE 2 MG: 2 TABLET ORAL at 21:43

## 2022-08-15 RX ADMIN — HYDRALAZINE HYDROCHLORIDE 25 MG: 50 TABLET, FILM COATED ORAL at 21:42

## 2022-08-15 RX ADMIN — MEROPENEM 1 G: 1 INJECTION INTRAVENOUS at 21:43

## 2022-08-15 RX ADMIN — MEROPENEM 1 G: 1 INJECTION INTRAVENOUS at 16:07

## 2022-08-15 RX ADMIN — Medication 5 MG: at 21:43

## 2022-08-15 RX ADMIN — GABAPENTIN 300 MG: 300 CAPSULE ORAL at 17:43

## 2022-08-15 RX ADMIN — DIVALPROEX SODIUM 500 MG: 500 TABLET, EXTENDED RELEASE ORAL at 17:42

## 2022-08-15 RX ADMIN — DAKIN'S SOLUTION 0.125% (QUARTER STRENGTH): 0.12 SOLUTION at 21:51

## 2022-08-15 RX ADMIN — GABAPENTIN 300 MG: 300 CAPSULE ORAL at 08:43

## 2022-08-15 RX ADMIN — OXYCODONE HYDROCHLORIDE AND ACETAMINOPHEN 1 TABLET: 5; 325 TABLET ORAL at 05:59

## 2022-08-15 RX ADMIN — HYDRALAZINE HYDROCHLORIDE 25 MG: 50 TABLET, FILM COATED ORAL at 08:43

## 2022-08-15 RX ADMIN — MEROPENEM 1 G: 1 INJECTION INTRAVENOUS at 06:01

## 2022-08-15 RX ADMIN — DEXAMETHASONE 2 MG: 2 TABLET ORAL at 08:43

## 2022-08-15 RX ADMIN — PYRIDOXINE HCL TAB 50 MG 200 MG: 50 TAB at 08:43

## 2022-08-15 RX ADMIN — DAKIN'S SOLUTION 0.125% (QUARTER STRENGTH): 0.12 SOLUTION at 08:45

## 2022-08-15 RX ADMIN — Medication 2500 MCG: at 08:43

## 2022-08-15 RX ADMIN — HYDRALAZINE HYDROCHLORIDE 25 MG: 50 TABLET, FILM COATED ORAL at 16:08

## 2022-08-15 RX ADMIN — ATENOLOL 100 MG: 50 TABLET ORAL at 08:44

## 2022-08-15 RX ADMIN — SODIUM CHLORIDE, PRESERVATIVE FREE 10 ML: 5 INJECTION INTRAVENOUS at 06:03

## 2022-08-15 NOTE — ROUTINE PROCESS
Assumed care of patient bedside report-- attempted to change dressings during shift report -- patient refused stating \"we don't need to do that until the morning, I'm just not right for that now\". 2306  Patient refusing dakins solution scheduled for 2200-- she states \"I know your not trying to do that now, I already told you I'm not ready for that until the morning\"    0600-- patient received a pain pill in anticipation of her allowing us to change dressings this morning--    0745- patient allowed dressing changes -- all the areas were cleansed and redressed has ordered-- patient was also bathed, linens changes -- CHG wipe use because of her PICC line-she tolerated all this well and compliments this writer and Anais ROJAS on the great job we did and how good she was feeling now. Patient tray for breakfast setup and she was eating when we left. Bedside and Verbal shift change report given to Anabel Turk (oncoming nurse) by Eugene Holguin RN (offgoing nurse). Report given with SBAR, Kardex, Intake/Output, MAR, Accordion and Recent Results.

## 2022-08-15 NOTE — PROGRESS NOTES
[facility] Hospitalist Group  Progress Note    Patient: Derek Miguel Age: 74 y.o. : 1955 MR#: [id number] SSN: [id number]  Date/Time: 8/15/2022     Subjective:   Pt w/o complaints. Case management met with patient today. Assessment/Plan:     74year old female admitted after presenting to [facility] ED w/ c/o left foot blister and left ankle pain. Also had findings of back wound that was malodorous and was admitted for management of severe sepsis secondary to lower back wound and draining left ankle blister. Sepsis 2y to Left foot blister, left ankle pain with chronic wounds on her lower back. CT abdomen and pelvis soft tissue stranding and ill-defined fluid collection along the right paralumbar region. S/p I&D with juanjose pus, necrotic muscle skin and subcutaneous tissue to bone. Continues broad-spectrum IV antibiotics, currently on Meropenem    Concern raised by ID specialist regarding interaction between Meropenem and Depakote. Discussed w/ pharmacist, level of Depakote might be lower in setting of concomitant Meropenem use, and increase risk of seizure episode. Neurology consulted for guidance on AED choice. Discussed w/ neurologist, Christian Jaeger made to double dose of depakote while on Meropenem. Appreciate assistance. Depakote dose doubled on 22. S/p PICC line placement by IR on 22. Bacteremia with wound cultures growing Klebsiella and Proteus mirabilis- continues on IV antibiotics, will defer duration and choice of ABx to infectious disease. Brain mass-patient currently has a neurosurgical appointment, was supposed to undergo biopsy for possible glioblastoma however it is currently pending. Continue dexamethasone 2 mg twice daily. Per patient's brothers she has been given timeframe of approximately 9 months to live.   History of hypertension - home medications resumed, monitor blood pressure  History of hypothyroidism-continue Synthroid  History of seizure disorder-continue gabapentin,Depakote dose doubled, please see above. History of breast cancer-continue tamoxifen  DVT prophylaxis-Lovenox  Full code        DISPO: discussed during IDR. Pt at this point has declined SNF and wants to go home, however, home health for home IV ABx via PICC line not possible, per CM team. Per psych consultant, pt able to make medical decisions at this time.  Spoke with pt along w/ CM and PT and had extensive discussion about disposition. Initially, pt responded that she wanted to be discharged to home despite being told that going home is not advised as pt may not be able to get the abx for treatment of her extensive soft tissue infection and w/o abx that she had high chance of adverse outcome including worsening infection and death. Later on she stated that she would like to think about it and seemed to be open to the idea of going through the facilities to choose the  SNF that would best suite her needs. The meeting was concluded w/ plans for CM to provide the pt with a list of facilities. Will cont to work w/ CM for safe d/c.  , : Pt states that she has been given a list of SNF's but thinks all are expensive, wants time to decide. 8/15-Case management met with patient today and notes reviewed, patient is agreeable to go to skilled nursing facility versus ARU. Will continue to monitor.     Bella Martínez MD  08/15/22      Case discussed with:  [x]Patient  []Family  [x]Nursing  [x]Case Management  DVT Prophylaxis:  [x]Lovenox  []Hep SQ  []SCDs  []Coumadin   []On Heparin gtt    Objective:   VS: Visit Vitals  BP (P) 118/72   Pulse (P) 67   Temp (P) 98.2 °F (36.8 °C)   Resp (P) 18   Ht 5' 3\" (1.6 m)   Wt 83.1 kg (183 lb 3.2 oz)   SpO2 (P) 97%   BMI 32.45 kg/m²        Tmax/24hrs: Temp (24hrs), Av.1 °F (36.7 °C), Min:97.8 °F (36.6 °C), Max:98.3 °F (36.8 °C)  IOBRIEFNo intake or output data in the 24 hours ending 08/15/22 8995      General:  Alert, non cooperative, no acute distress, blind in right eye  COR: no jvd, no peripheral edema  LUNGS: w/o increased wob  Neuro: no facial asymmetry, speech wnl  PSYCH: has appropriate affect, non agitated      Medications:   Current Facility-Administered Medications   Medication Dose Route Frequency    divalproex ER (DEPAKOTE ER) 24 hour tablet 500 mg  500 mg Oral BID    meropenem (MERREM) 1 g in 0.9% sodium chloride (MBP/ADV) 50 mL MBP  1 g IntraVENous Q8H    [Held by provider] collagenase (SANTYL) 250 unit/gram ointment   Topical BID    sodium hypochlorite (QUARTER STRENGTH DAKIN'S) 0.125% irrigation (bottle)   Topical BID    melatonin (rapid dissolve) tablet 5 mg  5 mg Oral QHS PRN    sodium chloride (NS) flush 5-40 mL  5-40 mL IntraVENous Q8H    sodium chloride (NS) flush 5-40 mL  5-40 mL IntraVENous PRN    acetaminophen (TYLENOL) tablet 650 mg  650 mg Oral Q6H PRN    Or    acetaminophen (TYLENOL) suppository 650 mg  650 mg Rectal Q6H PRN    polyethylene glycol (MIRALAX) packet 17 g  17 g Oral DAILY PRN    ondansetron (ZOFRAN ODT) tablet 4 mg  4 mg Oral Q8H PRN    Or    ondansetron (ZOFRAN) injection 4 mg  4 mg IntraVENous Q6H PRN    albuterol-ipratropium (DUO-NEB) 2.5 MG-0.5 MG/3 ML  3 mL Nebulization Q6H PRN    nitroglycerin (NITROBID) 2 % ointment 0.5 Inch  0.5 Inch Topical Q6H PRN    naloxone (NARCAN) injection 0.4 mg  0.4 mg IntraVENous EVERY 2 MINUTES AS NEEDED    amLODIPine (NORVASC) tablet 10 mg  10 mg Oral DAILY    cyanocobalamin (VITAMIN B12) sublingual tablet 2,500 mcg  2,500 mcg Oral DAILY    dexAMETHasone (DECADRON) tablet 2 mg  2 mg Oral Q12H    gabapentin (NEURONTIN) capsule 300 mg  300 mg Oral BID    hydrALAZINE (APRESOLINE) tablet 25 mg  25 mg Oral TID    levothyroxine (SYNTHROID) tablet 88 mcg  88 mcg Oral 6am    atenoloL (TENORMIN) tablet 100 mg  100 mg Oral DAILY    oxyCODONE-acetaminophen (PERCOCET) 5-325 mg per tablet 1 Tablet  1 Tablet Oral Q4H PRN    pyridoxine (vitamin B6) (VITAMIN B-6) tablet 200 mg  200 mg Oral DAILY       Imaging:   XR Results (most recent):  Results from Hospital Encounter encounter on 08/01/22    XR CHEST PORT    Narrative  EXAM: XR CHEST PORT    CLINICAL INDICATION/HISTORY: 74 years Female. chest pain. Additional History: None    TECHNIQUE: Frontal view of the chest    COMPARISON: Chest radiograph 3/2/2020    FINDINGS:    The cardiac silhouette appears within normal limits. Tortuosity of the thoracic  aorta, unchanged in appearance. Pulmonary vasculature appears within normal  limits. No confluent airspace opacity is appreciated. No definite evidence of  pleural effusion or pneumothorax. No acute osseous abnormality appreciated. Impression  No radiographic evidence of acute cardiopulmonary process. CT Results (most recent):  Results from Hospital Encounter encounter on 08/01/22    CT ABD PELV W CONT    Narrative  CT Abdomen And Pelvis with Intravenous Contrast    INDICATION: Lumbar soft tissue abscess. TECHNIQUE: 5 mm collimation axial images obtained from the diaphragm to the  level of the pubic symphysis following the uneventful administration of  100 cc  of low osmolar, nonionic intravenous contrast.    Dose reduction techniques used: Automated exposure control, adjustment of the  mAs and/or kVp according to patient size, standardized low-dose protocol, and/or  iterative reconstruction technique. COMPARISON: None. ABDOMEN FINDINGS:    Lung Bases: Bibasilar atelectasis. Small pleural effusions. Liver: Normal attenuation. No evidence for mass. Gallbladder: Cholelithiasis. No biliary ductal dilatation. Pancreas: Normal attenuation without mass or ductal dilatation. Spleen: Normal in size. No evidence of mass. .    Adrenal Glands: No evidence for mass. Kidneys:    Right: Normal enhancement. No cortical mass. No hydronephrosis. Left:  Normal enhancement. Left renal cyst.  No hydronephrosis. Lymph Nodes: Upper abdominal lymph nodes.  There is a 1.3 cm lymph node in the  jesse hepatis. Aorta:   Normal in caliber    PELVIS FINDINGS:    Bowel: Small Bowel: Normal in caliber with normal wall thickness. Large Bowel: Normal in caliber with normal wall thickness. Moderate stool  burden. Appendix: Normal appendix. Bladder: Underdistended. The uterus is surgically absent. There is no suspicious adnexal mass. There is no free air. There is a small volume of ascites. There is soft tissue stranding and some ill-defined fluid in the deep  subcutaneous tissues which measures approximately 4.8 x 3.1 cm. This is in the  right lateral lumbar region. Bones: No acute finding. Impression  Soft tissue stranding and ill-defined fluid in the right paralumbar region  measuring 4.8 x 3.1 cm. Small volume of ascites in the abdomen and pelvis. No definite acute bowel abnormality. Moderate stool burden. Additional incidentals as above. MRI Results (most recent):  Results from [place] encounter on 03/31/14    MRI BREAST BX VAC ASSIST RT    Addendum 4/3/2014  4:54 PM  Addendum: Pathology changes from right breast biopsy 12:00 position-  Fibrocystic changes with florid ductal epithelial hyperplasia. The findings are benign and concordant. Narrative  MR guided left breast biopsy    HISTORY: Left breast cancer, abnormal MRI    COMPARISON: MRI March 2014    FINDINGS:    Informed consent was obtained. The risks of the procedure including but not  limited to bleeding and infection were reviewed with the patient who wished to  proceed. Preliminary pre and post contrast images were performed. 20cc  Magnevist IV was administered. With the patient positioned prone, and following placement in the compression  device, the left breast was imaged, and the lesion in the 12:00 left breast was  targeted. Appropriate coordinates records were obtained. The skin was  cleansed and local anesthesia administered.   After making a small incision in  the skin with a # 11 scalpel blade, the trocar and biopsy sheath were advanced  to the appropriate depth. After confirmation of appropriate positioning the 9  gauge 20 mm vacuum assisted biopsy needle was advanced to the appropriate  depth. Subsequently a series of approximately 12 vacuum assisted samples were  acquired in a radial fashion. Prior to removal of the sheath, using coaxial technique, a SetuServ  cylinder shaped clip was placed to oscar area sampled. The patient was removed from the biopsy device, the wound was dressed and post  biopsy instructions were given to the patient. The samples were sent to the  laboratory for analysis. Diagnostic Mammogram left Breast: CC and MLO views were obtained of the left  breast and document the clip at the site of sampling in the 12:00 left breast.    Complications: No complications. Specimen: 12 vacuum assisted samples  Assistant: None. EBL: 5 cc    Impression  :    Status post MRI guided biopsy of an abnormal area of enhancement in the 12:00  left breast.  Area sampled marked with a     clip. Samples sent to the  laboratory for analysis. Labs:    No results found for this or any previous visit (from the past 48 hour(s)). Signed By: Daisy Hernandez MD     August 15, 2022      I spent 25 minutes with the patient in face-to-face consultation, of which greater than 50% was spent in counseling and coordination of care as described above    Disclaimer: Sections of this note are dictated using utilizing voice recognition software. Minor typographical errors may be present. If questions arise, please do not hesitate to contact me or call our department.

## 2022-08-15 NOTE — PROGRESS NOTES
Problem: Mobility Impaired (Adult and Pediatric)  Goal: *Acute Goals and Plan of Care (Insert Text)  Description: Physical Therapy Goals  Initiated 8/2/2022, re-evaluated 8/9/22 and goals adjusted and to be accomplished within 7 day(s)  1. Patient will move from supine to sit and sit to supine , scoot up and down, and roll side to side in bed with minimal assistance/contact guard assist.    2.  Patient will transfer from bed to chair and chair to bed with SBA using the least restrictive device. 3.  Patient will perform sit to stand with CGA  4. Patient will ambulate with minimal assistance/contact guard assist for 25 feet with the least restrictive device. 5.  Patient will ascend/descend stairs  as needed. 6.  Patient will perform BLE therex as prescribed    PLOF: Pt lives alone, reports she has a rollator, receives some assist from brothers      Outcome: Progressing Towards Goal   PHYSICAL THERAPY TREATMENT    Patient: Debora Pineda (17 y.o. female)  Date: 8/15/2022  Diagnosis: Sepsis ([address] [A41.9]  Leukocytosis [D72.829]  Open wound of lower back [S31.000A] Severe sepsis ([address]      Precautions: Fall, Skin      ASSESSMENT:  Pt continues to make functional progression as pt demonstrates good carryover with mobility instruction from previous sessions. Although pt demonstrates less need for cueing for sequencing to mobilize OOB, pt does cont to require assistance when standing from lower bed surface secondary to impaired functional strength and impaired WSing. Once pt is over his ELMA, pt is able to maintain balance with UE support of RW, however dynamic balance remains impaired, requiring assistance for pericare in standing. Pt amb to recliner, req further cueing for desired gait sequencing, step height, and step length, positioned in recliner with all needs in reach, and left safely in room.  Pt educated on benefits of mobility and OOB to bathroom and chair vs use of Purewick to reduce risk of infection and possibility of longer hospital stay. RW provided in room and nurse notified of capabilities to facilitate OOB mobility. Progression toward goals: good   [x]      Improving appropriately and progressing toward goals  []      Improving slowly and progressing toward goals  []      Not making progress toward goals and plan of care will be adjusted     PLAN:  Patient continues to benefit from skilled intervention to address the above impairments. Continue treatment per established plan of care. Further Equipment Recommendations for Discharge:  bedside commode, rolling walker, and N/A    AMPAC: Based on an AM-PAC score of 14/20 if omitting stairs and their current functional mobility deficits, it is recommended that the patient have 3-5 sessions per week of Physical Therapy at d/c to increase the patient's independence. This AMPAC score should be considered in conjunction with interdisciplinary team recommendations to determine the most appropriate discharge setting. Patient's social support, diagnosis, medical stability, and prior level of function should also be taken into consideration. SUBJECTIVE:   Patient stated I can't use the [address] when I go home?     OBJECTIVE DATA SUMMARY:   Critical Behavior:  Neurologic State: Alert, Appropriate for age  Orientation Level: Oriented X4  Cognition: Appropriate for age attention/concentration  Safety/Judgement: Fall prevention  Functional Mobility Training:  Bed Mobility  Supine to Sit: Minimum assistance  Scooting: Minimum assistance  Transfers:  Sit to Stand: Moderate assistance  Stand to Sit: Minimum assistance  Balance:  Sitting: Intact  Sitting - Static: Good (unsupported)  Sitting - Dynamic: Fair (occasional)  Standing: Impaired; With support  Standing - Static: Fair  Standing - Dynamic : Fair  Ambulation/Gait Training:  Distance (ft): 10 Feet (ft)  Assistive Device: Walker, rolling  Ambulation - Level of Assistance: Minimal assistance;Assist x2  Gait Abnormalities: Decreased step clearance  Base of Support: Center of gravity altered;Narrowed  Speed/[place]: Slow;Shuffled  Step Length: Right shortened;Left shortened          Pain:  Pain level pre-treatment: 0/10  Pain level post-treatment: 0/10       Activity Tolerance:   Good   Please refer to the flowsheet for vital signs taken during this treatment. After treatment:   [x] Patient left in no apparent distress sitting up in chair  [] Patient left in no apparent distress in bed  [x] Call bell left within reach  [x] Nursing notified  [] Caregiver present  [x] Chair alarm activated  [] SCDs applied      COMMUNICATION/EDUCATION:   [x]         Role of Physical Therapy in the acute care setting. [x]         Fall prevention education was provided and the patient/caregiver indicated understanding. [x]         Patient/family have participated as able in working toward goals and plan of care. [x]         Patient/family agree to work toward stated goals and plan of care. []         Patient understands intent and goals of therapy, but is neutral about his/her participation. []         Patient is unable to participate in stated goals/plan of care: ongoing with therapy staff.  []         Other:        Aline Favre, PTA   Time Calculation: 31 mins    [facility] AM-PAC® Basic Mobility Inpatient Short Form (6-Clicks) Version 2    How much HELP from another person does the patient currently need    (If the patient hasn't done an activity recently, how much help from another person do you think he/she would need if he/she tried?)   Total (Total A or Dep)   A Lot  (Mod to Max A)   A Little (Sup or Min A)   None (Mod I to I)   Turning from your back to your side while in a flat bed without using bedrails? [] 1 [] 2 [x] 3 [] 4   2. Moving from lying on your back to sitting on the side of a flat bed without using bedrails? [] 1 [] 2 [x] 3 [] 4   3.  Moving to and from a bed to a chair (including a wheelchair)? [] 1 [] 2 [x] 3 [] 4   4. Standing up from a chair using your arms (e.g., wheelchair, or bedside chair)? [] 1 [x] 2 [] 3 [] 4   5. Walking in hospital room? [] 1 [] 2 [x] 3 [] 4   6. Climbing 3-5 steps with a railing?+   [] 1 [] 2 [] 3 [] 4   +If stair climbing cannot be assessed, skip item #6. Sum responses from items 1-5. Based on an AM-PAC score of 14/20 if omitting stairs and their current functional mobility deficits, it is recommended that the patient have 3-5 sessions per week of Physical Therapy at d/c to increase the patient's independence.

## 2022-08-15 NOTE — REHAB NOTE
ARU/IPR REFERRAL CONTACT NOTE  [id number] [facility]      Thank you for the opportunity to review this patient's case for admission to [id number] [facility]. Based on our pre-admission screening:     [ x] Our Team/Medical Director is following this case. Comments:Referral received and met with patient. Discussed ARU, average LOS, and requirements of participation in therapy program. Pt reported understanding. Discussed with patient the need to have a primary caregiver that could assist at discharge to ensure a safe transition. Pt stated that she would need to make some phone calls to determine if she could have reliable support. Pt agreeable to reach out to friends and family so a caregiver can be designated. Will follow up with patient about who caregiver will be at discharge. Again, Thank you for this referral. Should you have any questions please do not hesitate to call.      Sincerely,  Mono Ernandez [facility]  [phone]

## 2022-08-15 NOTE — PROGRESS NOTES
Infectious Disease progress Note        Reason: Gram-negative bloodstream infection    Current abx Prior abx   Zosyn since 8/1/2022-8/9  Meropenem since 8/9/22  levofloxacin, vancomycin 8/1-8/3     Lines:       Assessment :     74 y.o. female with PMHx significant for htn, seizures, breast cancer, recently diagnosed glioblastoma with vasogenic edema (7/2022), recent hospitalization at [facility] who presented to [facility] ER on 8/1/22 with complaint of Left Foot Blister and Left Ankle Pain. [address] 7/1-7/4 for fall, diagnosed to have glioblastoma multiforme with mass effect s/p decadron    Hospitalization at [address] [facility] 7/14/22-7/22/22 for fall, AMS (declined hospice during that admission)    Clinical presentation concerning for sepsis-present on admission due to Bacteroides bloodstream infection.  -> Blood culture negative at 6 days    ->intera-abdominal source- No evidence of GI infection noted clinically , but CT a/p showed   4.8 x 3.1 cm fluid collection in the right lateral lumbar region-  Lumbar soft tissue abscess as a source of Bacteroides bloodstream infection-  ->S/p bedside I&D. 8/5. grossly necrotic wounds with juanjose pus to bone- cx positive for polyorganism- Enterobacter cloacae complex, Proteus, and Citrobacter spp as well as Bacteroides. Immunocompromise state due to concurrent steroids can mask the full clinical presentation    Surgery follow up appreciated. Plans for bedside I&D noted    Necrotic lower back ulcers, pressure necrosis right heel-likely due to prolonged period of unresponsiveness/pressure injury July 2022    bilateral foot blisters, left leg ulcer-no clinical evidence of infection noted on today's exam.  Wound culture 8/1/2022-Klebsiella, proteus, e.coli, MSSA-likely colonizer    Glioblastoma multiforme-plans for outpatient neurosurgery noted.   Currently on Decadron  Distant history of seizures-on Depakote    Recommendations:    Continue meropenem, given R for Piptazo demonstrated by Enterobacter. Low risk of increased seizure activity with meropenem, drug interaction with Depakote exists-    -She had neurology's evaluation for need for AEDs  given infection extending to the the bone- she will need a long course of IV abx- 6 weeks. End date: September 19  If unable to find facility which accepts patient with meropenem or unable to arrange for home iv antibiotics, other option is to switch patient to prolonged levofloxacin/metronidazole (risk of tendinopathy, quinolone resistance with this option)    Continue local wound care left leg ulcer, pressure offloading right heel, wound care back ulcers       Poor chances of wound healing looking at the extent of infection, and need for steroids. Follow up  wound cx & modify abx accordingly. F/u palliative care and psych consults to discuss goals of care. Above plan was discussed in details with patient and primary team. Please call me if any further questions or concerns. Will continue to participate in the care of this patient. Please call back if any further questions    HPI:    Denies any fevers, chills. No new complaints or events. States that she can't afford copay of home iv abx. Wishes to go home.       Current Facility-Administered Medications   Medication Dose Route Frequency    divalproex ER (DEPAKOTE ER) 24 hour tablet 500 mg  500 mg Oral BID    meropenem (MERREM) 1 g in 0.9% sodium chloride (MBP/ADV) 50 mL MBP  1 g IntraVENous Q8H    [Held by provider] collagenase (SANTYL) 250 unit/gram ointment   Topical BID    sodium hypochlorite (QUARTER STRENGTH DAKIN'S) 0.125% irrigation (bottle)   Topical BID    melatonin (rapid dissolve) tablet 5 mg  5 mg Oral QHS PRN    sodium chloride (NS) flush 5-40 mL  5-40 mL IntraVENous Q8H    sodium chloride (NS) flush 5-40 mL  5-40 mL IntraVENous PRN    acetaminophen (TYLENOL) tablet 650 mg  650 mg Oral Q6H PRN    Or    acetaminophen (TYLENOL) suppository 650 mg  650 mg Rectal Q6H PRN    polyethylene glycol (MIRALAX) packet 17 g  17 g Oral DAILY PRN    ondansetron (ZOFRAN ODT) tablet 4 mg  4 mg Oral Q8H PRN    Or    ondansetron (ZOFRAN) injection 4 mg  4 mg IntraVENous Q6H PRN    albuterol-ipratropium (DUO-NEB) 2.5 MG-0.5 MG/3 ML  3 mL Nebulization Q6H PRN    nitroglycerin (NITROBID) 2 % ointment 0.5 Inch  0.5 Inch Topical Q6H PRN    naloxone (NARCAN) injection 0.4 mg  0.4 mg IntraVENous EVERY 2 MINUTES AS NEEDED    amLODIPine (NORVASC) tablet 10 mg  10 mg Oral DAILY    cyanocobalamin (VITAMIN B12) sublingual tablet 2,500 mcg  2,500 mcg Oral DAILY    dexAMETHasone (DECADRON) tablet 2 mg  2 mg Oral Q12H    gabapentin (NEURONTIN) capsule 300 mg  300 mg Oral BID    hydrALAZINE (APRESOLINE) tablet 25 mg  25 mg Oral TID    levothyroxine (SYNTHROID) tablet 88 mcg  88 mcg Oral 6am    atenoloL (TENORMIN) tablet 100 mg  100 mg Oral DAILY    oxyCODONE-acetaminophen (PERCOCET) 5-325 mg per tablet 1 Tablet  1 Tablet Oral Q4H PRN    pyridoxine (vitamin B6) (VITAMIN B-6) tablet 200 mg  200 mg Oral DAILY       Allergies: Patient has no known allergies. Temp (24hrs), Av °F (36.7 °C), Min:97.6 °F (36.4 °C), Max:98.3 °F (36.8 °C)    Visit Vitals  /77   Pulse 70   Temp 98.3 °F (36.8 °C)   Resp 16   Ht 5' 3\" (1.6 m)   Wt 83.1 kg (183 lb 3.2 oz)   SpO2 98%   BMI 32.45 kg/m²       ROS: 12 point ROS obtained in details. Pertinent positives as mentioned in HPI,   otherwise negative    Physical Exam:    General:  female patient laying on the bed AAOx3 in no acute distress. General:   awake alert and oriented   HEENT:  Normocephalic, atraumatic, right eye cataract?,   Dentition good. Neck supple, no bruits.    Lymph Nodes:   Not examined   Lungs:   non-labored, bilateral chest movements equal, no audible wheezing   Heart:  RRR, s1 and s2; no  rubs or gallops, no edema   Abdomen:  soft, non-distended, Non-tender   Genitourinary:  No galvez   Extremities:   superficial pressure necrosis right heel, blister on dorsal aspect of left foot-no surrounding erythema. Superficial ulceration medial aspect of right leg with red granulation tissue-no purulent drainage/no surrounding erythema, muscle mass appropriate for age                            Skin:  Skin changes bilateral lower extremity as mentioned above. Linear ulceration lumbar region with dry necrotic tissue-no surrounding erythema, no purulent drainage; dry necrotic ulcer upper back; superficial ulcer right buttock- no drainage   Back: Stage IV decubitus pressure ulcer of the back-down to muscle fascia-wound looks clean out any signs of active infection   Psychiatric:  No suicidal or homicidal ideations, appropriate mood and affect         Labs: Results:   Chemistry No results for input(s): GLU, NA, K, CL, CO2, BUN, CREA, CA, AGAP, BUCR, TBIL, AP, TP, ALB, GLOB, AGRAT in the last 72 hours. No lab exists for component: GPT     CBC w/Diff No results for input(s): WBC, RBC, HGB, HCT, PLT, GRANS, LYMPH, EOS, HGBEXT, HCTEXT, PLTEXT, HGBEXT, HCTEXT, PLTEXT in the last 72 hours. Microbiology No results for input(s): CULT in the last 72 hours. RADIOLOGY:    All available imaging studies/reports in [facility] for this admission were reviewed      Disclaimer: Sections of this note are dictated utilizing voice recognition software, which may have resulted in some phonetic based errors in grammar and contents. Even though attempts were made to correct all the mistakes, some may have been missed, and remained in the body of the document. If questions arise, please contact our department.       August 15, 2022

## 2022-08-15 NOTE — PROGRESS NOTES
CM met at bedside to discuss pt's transotion o care needs and have a safe dc. CM introduced herself and stated the reason for contact. CM discussed PT/OT recommendations of  ARU/SNF. Pt initially stated she was not going to ARU/SNF. Pt stated she  does not have a 24/7 care giver in the home, but has friends that are available to assist when needed. Pt stated information was presented as pt was required to pay out of pocket for ARU and SNF. Pt  there was no clear understanding of options and choices available. in regards to pt's dc plan. Pt stated information has to presented in a way for the pt to process what is being stated; Pt stated she was not provided that opportunity. Pt stated she resides alone but has the ability to maneuver and complete simple tasks. Pt stated the ability to utilize common sense and know what she can and cannot do. Pt disclosed recent care home and limited finances for extra medical needs. CM dicussed pt having Medicare and recommended, [facility] discussing pt apply for Medicaid to seek personal care in the home if approved. Pt  is in agreement with CM reaching out to ARU  for placement in program with the second option being SNF Pt will seek at least five choices for SNF placement if pt does not meet ARU admission criteria. Pt is in agreement with meeting with Kassie Torres at [address] for a Medication Application Referral. CM stated she will be in contact with updates. 1330: CM emailed Kassie Torres with  [facility] with Medicaid Application Referral for pt.     1400- CM made contact with Roland Murcia with ARU to review pt for candidacy in [address] at [facility] will review  pt's clinicals and discuss ARU program with pt.        ALONA De Jesus  Care Management

## 2022-08-15 NOTE — PROGRESS NOTES
Problem: Self Care Deficits Care Plan (Adult)  Goal: *Acute Goals and Plan of Care (Insert Text)  Description: Occupational Therapy Goals  Initiated 8/2/2022 within 7 day(s), re-evaluation 8/11/2022 - pt making steady progress towards goals, goals # 1 & 4 upgraded      1. Patient will perform bed mobility in preparation for self-care with Supv bed simulated to home environment . 2.  Patient will perform upper body dressing with supervision/set-up. 3.  Patient will perform functional activity sitting EOB with modified independence > 5 minutes, G balance. 4.  Patient will perform toilet transfers with contact guard assist using RW. 5.  Patient will perform all aspects of toileting with minimal assistance/contact guard assist.  6.  Patient will participate in upper extremity therapeutic exercise/activities with supervision/set-up for 8 minutes. 7.  Patient will utilize energy conservation techniques during functional activities with verbal cues. Prior Level of Function: Patient reports she needed assist with self-care prn from her brothers and used a rollator for functional mobility PTA. Outcome: Progressing Towards Goal      OCCUPATIONAL THERAPY TREATMENT    Patient: Santos Nix (3 y.o. female)  Date: 8/15/2022  Diagnosis: Sepsis ([address] [A41.9]  Leukocytosis [D72.829]  Open wound of lower back [S31.000A] Severe sepsis [facility])      Precautions: Fall, Skin    Chart, occupational therapy assessment, plan of care, and goals were reviewed. ASSESSMENT:  Nursing/RN cleared for pt to participate in OT tx session. Patient was seen with PT to maximize patient safety, participation, and functional mobility in preparation for self-care tasks. Pt presents laying supine in bed, agreeable to bed mobility, Min A supine -> sit edge of bed with additional time and use of bed rail, Mod A STS, toilet hygiene in stance w/ RW: dep with posterior and CGA with anterior pericare.  Bariatric bedsidie commode issued to increase toileting with assist and use of RW d/t pt utilizing Pure wick, pt encouraged to call for assist for toileting. Functional mobility: Min A using RW from bed -> recliner. Pt sitting up in recliner, BLEs elevated. Call bell within reach & pt verbalized understanding and provided return demonstration to utilize for assist e.g. functional transfers in order to prevent falls. Progression toward goals:  []          Improving appropriately and progressing toward goals  [x]          Improving slowly and progressing toward goals  []          Not making progress toward goals and plan of care will be adjusted     PLAN:  Patient continues to benefit from skilled intervention to address the above impairments. Continue treatment per established plan of care. Further Equipment Recommendations for Discharge:  bedside commode, hospital bed, shower chair, rolling walker, and wheelchair     AMPAC: Based on an AM-PAC score of 15/24 and their current ADL deficits; it is recommended that the patient have 3-5 sessions per week of Occupational Therapy at d/c to increase the patient's independence. This AMPAC score should be considered in conjunction with interdisciplinary team recommendations to determine the most appropriate discharge setting. Patient's social support, diagnosis, medical stability, and prior level of function should also be taken into consideration. SUBJECTIVE:   Patient stated I don't feel very confident.     OBJECTIVE DATA SUMMARY:   Cognitive/Behavioral Status:  Neurologic State: Alert  Orientation Level: Oriented X4  Cognition: Follows commands  Safety/Judgement: Fall prevention    Functional Mobility and Transfers for ADLs:   Bed Mobility:     Supine to Sit: Minimum assistance     Scooting: Minimum assistance   Transfers:  Sit to Stand:  Moderate assistance  Stand to Sit: Minimum assistance     Bed to Chair: Minimum assistance  Balance:  Sitting: Intact  Sitting - Static: Good (unsupported)  Sitting - Dynamic: Fair (occasional)  Standing: Impaired; With support  Standing - Static: Fair  Standing - Dynamic : Fair    ADL Intervention:  Upper Body [address]: Minimum  assistance     Toileting  Bladder Hygiene: Contact guard assistance  Bowel Hygiene: Total assistance (dependent)    Cognitive Retraining  Safety/Judgement: Fall prevention    Pain:  Pain level pre-treatment: 2/10   Pain level post-treatment: 2/10  Pain Intervention(s): Medication (see MAR); Rest, Ice, Repositioning   Response to intervention: Nurse notified, See doc flow    Activity Tolerance:    poor  Please refer to the flowsheet for vital signs taken during this treatment. After treatment:   []  Patient left in no apparent distress sitting up in chair  [x]  Patient left in no apparent distress in bed  [x]  Call bell left within reach  [x]  Nursing notified  []  Caregiver present  []  Bed alarm activated    COMMUNICATION/EDUCATION:   [x] Role of Occupational Therapy in the acute care setting  [x] Home safety education was provided and the patient/caregiver indicated understanding. [x] Patient/family have participated as able in working towards goals and plan of care. [x] Patient/family agree to work toward stated goals and plan of care. [] Patient understands intent and goals of therapy, but is neutral about his/her participation. [] Patient is unable to participate in goal setting and plan of care. Thank you for this referral.  Breanna Theodore  Time Calculation: 26 mins    [facility] AM-PAC® Daily Activity Inpatient Short Form (6-Clicks)*    How much HELP from another person does the patient currently need    (If the patient hasn't done an activity recently, how much help from another person do you think he/she would need if he/she tried?)   Total (Total A or Dep)   A Lot  (Mod to Max A)   A Little (Sup or Min A)   None (Mod I to I)   Putting on and taking off regular lower body clothing?    [] 1 [] 2 [x] 3 [] 4   2. Bathing (including washing, rinsing,      drying)? [] 1 [x] 2 [] 3 [] 4   3. Toileting, which includes using toilet, bedpan or urinal?   [] 1 [x] 2 [] 3 [] 4   4. Putting on and taking off regular upper body clothing? [x] 1 [] 2 [] 3 [] 4   5. Taking care of personal grooming such as brushing teeth? [] 1 [] 2 [x] 3 [] 4   6. Eating meals? [] 1 [] 2 [] 3 [x] 4      Based on an AM-PAC score of **/24 and their current ADL deficits; it is recommended that the patient have 5-7 sessions per week of Occupational Therapy at d/c to increase the patient's independence. Currently, this patient demonstrates the potential endurance, and/or tolerance for 3 hours of therapy each day at d/c. Based on an AM-PAC score of **/24 and their current ADL deficits; it is recommended that the patient have 3-5 sessions per week of Occupational Therapy at d/c to increase the patient's independence. Based on an AM-PAC score of **/24 and their current ADL deficits; it is recommended that the patient have 2-3 sessions per week of Occupational Therapy at d/c to increase the patient's independence. At this time and based on an AM-PAC score of **/24, no further OT is recommended upon discharge due to (i.e. patient at baseline functional statusetc). Recommend patient returns to prior setting with prior services.

## 2022-08-16 LAB
ANION GAP SERPL CALC-SCNC: 5 MMOL/L (ref 3–18)
BASOPHILS # BLD: 0 K/UL (ref 0–0.1)
BASOPHILS NFR BLD: 0 % (ref 0–2)
BUN SERPL-MCNC: 40 MG/DL (ref 7–18)
BUN/CREAT SERPL: 52 (ref 12–20)
CALCIUM SERPL-MCNC: 9.2 MG/DL (ref 8.5–10.1)
CHLORIDE SERPL-SCNC: 103 MMOL/L (ref 100–111)
CO2 SERPL-SCNC: 30 MMOL/L (ref 21–32)
CREAT SERPL-MCNC: 0.77 MG/DL (ref 0.6–1.3)
DIFFERENTIAL METHOD BLD: ABNORMAL
EOSINOPHIL # BLD: 0 K/UL (ref 0–0.4)
EOSINOPHIL NFR BLD: 0 % (ref 0–5)
ERYTHROCYTE [DISTWIDTH] IN BLOOD BY AUTOMATED COUNT: 15.5 % (ref 11.6–14.5)
GLUCOSE SERPL-MCNC: 136 MG/DL (ref 74–99)
HCT VFR BLD AUTO: 38.1 % (ref 35–45)
HGB BLD-MCNC: 12.5 G/DL (ref 12–16)
IMM GRANULOCYTES # BLD AUTO: 0 K/UL
IMM GRANULOCYTES NFR BLD AUTO: 0 %
LYMPHOCYTES # BLD: 3.6 K/UL (ref 0.9–3.6)
LYMPHOCYTES NFR BLD: 15 % (ref 21–52)
MCH RBC QN AUTO: 30 PG (ref 24–34)
MCHC RBC AUTO-ENTMCNC: 32.8 G/DL (ref 31–37)
MCV RBC AUTO: 91.4 FL (ref 78–100)
MONOCYTES # BLD: 0.5 K/UL (ref 0.05–1.2)
MONOCYTES NFR BLD: 2 % (ref 3–10)
NEUTS BAND NFR BLD MANUAL: 2 % (ref 0–5)
NEUTS SEG # BLD: 19.6 K/UL (ref 1.8–8)
NEUTS SEG NFR BLD: 81 % (ref 40–73)
NRBC # BLD: 0.06 K/UL (ref 0–0.01)
NRBC BLD-RTO: 0.3 PER 100 WBC
PLATELET # BLD AUTO: 343 K/UL (ref 135–420)
PLATELET COMMENTS,PCOM: ABNORMAL
PMV BLD AUTO: 11.2 FL (ref 9.2–11.8)
POTASSIUM SERPL-SCNC: 4.9 MMOL/L (ref 3.5–5.5)
RBC # BLD AUTO: 4.17 M/UL (ref 4.2–5.3)
RBC MORPH BLD: ABNORMAL
SODIUM SERPL-SCNC: 138 MMOL/L (ref 136–145)
WBC # BLD AUTO: 23.7 K/UL (ref 4.6–13.2)

## 2022-08-16 PROCEDURE — 36415 COLL VENOUS BLD VENIPUNCTURE: CPT

## 2022-08-16 PROCEDURE — 74011000250 HC RX REV CODE- 250: Performed by: INTERNAL MEDICINE

## 2022-08-16 PROCEDURE — 74011250637 HC RX REV CODE- 250/637: Performed by: HOSPITALIST

## 2022-08-16 PROCEDURE — 99232 SBSQ HOSP IP/OBS MODERATE 35: CPT | Performed by: HOSPITALIST

## 2022-08-16 PROCEDURE — 74011250636 HC RX REV CODE- 250/636: Performed by: INTERNAL MEDICINE

## 2022-08-16 PROCEDURE — 65270000046 HC RM TELEMETRY

## 2022-08-16 PROCEDURE — 80048 BASIC METABOLIC PNL TOTAL CA: CPT

## 2022-08-16 PROCEDURE — 74011000250 HC RX REV CODE- 250: Performed by: SURGERY

## 2022-08-16 PROCEDURE — 74011250637 HC RX REV CODE- 250/637: Performed by: INTERNAL MEDICINE

## 2022-08-16 PROCEDURE — 97164 PT RE-EVAL EST PLAN CARE: CPT

## 2022-08-16 PROCEDURE — 97535 SELF CARE MNGMENT TRAINING: CPT

## 2022-08-16 PROCEDURE — 85025 COMPLETE CBC W/AUTO DIFF WBC: CPT

## 2022-08-16 PROCEDURE — 97530 THERAPEUTIC ACTIVITIES: CPT

## 2022-08-16 PROCEDURE — 74011000258 HC RX REV CODE- 258: Performed by: INTERNAL MEDICINE

## 2022-08-16 RX ADMIN — GABAPENTIN 300 MG: 300 CAPSULE ORAL at 09:25

## 2022-08-16 RX ADMIN — HYDRALAZINE HYDROCHLORIDE 25 MG: 50 TABLET, FILM COATED ORAL at 17:29

## 2022-08-16 RX ADMIN — MEROPENEM 1 G: 1 INJECTION INTRAVENOUS at 22:04

## 2022-08-16 RX ADMIN — DIVALPROEX SODIUM 500 MG: 500 TABLET, EXTENDED RELEASE ORAL at 17:29

## 2022-08-16 RX ADMIN — LEVOTHYROXINE SODIUM 88 MCG: 88 TABLET ORAL at 05:10

## 2022-08-16 RX ADMIN — SODIUM CHLORIDE, PRESERVATIVE FREE 10 ML: 5 INJECTION INTRAVENOUS at 22:26

## 2022-08-16 RX ADMIN — MEROPENEM 1 G: 1 INJECTION INTRAVENOUS at 05:10

## 2022-08-16 RX ADMIN — SODIUM CHLORIDE, PRESERVATIVE FREE 10 ML: 5 INJECTION INTRAVENOUS at 13:31

## 2022-08-16 RX ADMIN — ATENOLOL 100 MG: 50 TABLET ORAL at 09:25

## 2022-08-16 RX ADMIN — DIVALPROEX SODIUM 500 MG: 500 TABLET, EXTENDED RELEASE ORAL at 09:25

## 2022-08-16 RX ADMIN — Medication 2500 MCG: at 09:25

## 2022-08-16 RX ADMIN — DEXAMETHASONE 2 MG: 2 TABLET ORAL at 22:04

## 2022-08-16 RX ADMIN — DEXAMETHASONE 2 MG: 2 TABLET ORAL at 09:25

## 2022-08-16 RX ADMIN — AMLODIPINE BESYLATE 10 MG: 10 TABLET ORAL at 09:26

## 2022-08-16 RX ADMIN — HYDRALAZINE HYDROCHLORIDE 25 MG: 50 TABLET, FILM COATED ORAL at 22:04

## 2022-08-16 RX ADMIN — GABAPENTIN 300 MG: 300 CAPSULE ORAL at 17:29

## 2022-08-16 RX ADMIN — SODIUM CHLORIDE, PRESERVATIVE FREE 10 ML: 5 INJECTION INTRAVENOUS at 05:16

## 2022-08-16 RX ADMIN — PYRIDOXINE HCL TAB 50 MG 200 MG: 50 TAB at 09:25

## 2022-08-16 RX ADMIN — MEROPENEM 1 G: 1 INJECTION INTRAVENOUS at 13:53

## 2022-08-16 RX ADMIN — DAKIN'S SOLUTION 0.125% (QUARTER STRENGTH): 0.12 SOLUTION at 22:23

## 2022-08-16 RX ADMIN — OXYCODONE HYDROCHLORIDE AND ACETAMINOPHEN 1 TABLET: 5; 325 TABLET ORAL at 22:05

## 2022-08-16 RX ADMIN — HYDRALAZINE HYDROCHLORIDE 25 MG: 50 TABLET, FILM COATED ORAL at 09:26

## 2022-08-16 RX ADMIN — Medication 5 MG: at 22:04

## 2022-08-16 NOTE — PROGRESS NOTES
[facility]  [facility]    Patient: Angela Butt Age: 74 y.o. : 1955 MR#: [id number] SSN: [id number]  Date/Time: 2022     Subjective:   Pt w/o complaints. Case management met with patient today. Assessment/Plan:     74year old female admitted after presenting to [facility] ED w/ c/o left foot blister and left ankle pain. Also had findings of back wound that was malodorous and was admitted for management of severe sepsis secondary to lower back wound and draining left ankle blister. Sepsis 2y to Left foot blister, left ankle pain with chronic wounds on her lower back. CT abdomen and pelvis soft tissue stranding and ill-defined fluid collection along the right paralumbar region. S/p I&D with juanjose pus, necrotic muscle skin and subcutaneous tissue to bone. Continues broad-spectrum IV antibiotics, currently on Meropenem    Concern raised by ID specialist regarding interaction between Meropenem and Depakote. Discussed w/ pharmacist, level of Depakote might be lower in setting of concomitant Meropenem use, and increase risk of seizure episode. Neurology consulted for guidance on AED choice. Discussed w/ neurologist, Ran Nephew made to double dose of depakote while on Meropenem. Appreciate assistance. Depakote dose doubled on 22. S/p PICC line placement by IR on 22. Bacteremia with wound cultures growing Klebsiella and Proteus mirabilis- continues on IV antibiotics, will defer duration and choice of ABx to infectious disease. Brain mass-patient currently has a neurosurgical appointment, was supposed to undergo biopsy for possible glioblastoma however it is currently pending. Continue dexamethasone 2 mg twice daily. Per patient's brothers she has been given timeframe of approximately 9 months to live.   History of hypertension - home medications resumed, monitor blood pressure  History of hypothyroidism-continue Synthroid  History of seizure disorder-continue gabapentin,Depakote dose doubled, please see above. History of breast cancer-continue tamoxifen  DVT prophylaxis-Lovenox  Full code        DISPO: discussed during IDR. Pt at this point has declined SNF and wants to go home, however, home health for home IV ABx via PICC line not possible, per CM team. Per psych consultant, pt able to make medical decisions at this time. 8/12 Spoke with pt along w/ CM and PT and had extensive discussion about disposition. Initially, pt responded that she wanted to be discharged to home despite being told that going home is not advised as pt may not be able to get the abx for treatment of her extensive soft tissue infection and w/o abx that she had high chance of adverse outcome including worsening infection and death. Later on she stated that she would like to think about it and seemed to be open to the idea of going through the facilities to choose the  SNF that would best suite her needs. The meeting was concluded w/ plans for CM to provide the pt with a list of facilities. Will cont to work w/ CM for safe d/c.  8/13, 8/14: Pt states that she has been given a list of SNF's but thinks all are expensive, wants time to decide. 8/15-Case management met with patient today and notes reviewed, patient is agreeable to go to skilled nursing facility versus ARU. Will continue to monitor. 8/16-patient seen and evaluated, mentions that she is agreeable to go to ARU however will not go to skilled nursing facility. Continue current treatment plan.     Juhi Diop MD  08/16/22      Case discussed with:  [x]Patient  []Family  [x]Nursing  [x]Case Management  DVT Prophylaxis:  [x]Lovenox  []Hep SQ  []SCDs  []Coumadin   []On Heparin gtt    Objective:   VS: Visit Vitals  /71 (BP 1 Location: Left lower arm, BP Patient Position: At rest)   Pulse 70   Temp 97.7 °F (36.5 °C)   Resp 16   Ht 5' 3\" (1.6 m)   Wt 83.1 kg (183 lb 3.2 oz)   SpO2 98%   BMI 32.45 kg/m² Tmax/24hrs: Temp (24hrs), Av °F (36.7 °C), Min:97.6 °F (36.4 °C), Max:98.4 °F (36.9 °C)  IOBRIEF  Intake/Output Summary (Last 24 hours) at 2022 1324  Last data filed at 2022 8054  Gross per 24 hour   Intake 720 ml   Output --   Net 720 ml         General:  Alert, non cooperative, no acute distress, blind in right eye  COR: no jvd, no peripheral edema  LUNGS: w/o increased wob  Neuro: no facial asymmetry, speech wnl  PSYCH: has appropriate affect, non agitated      Medications:   Current Facility-Administered Medications   Medication Dose Route Frequency    divalproex ER (DEPAKOTE ER) 24 hour tablet 500 mg  500 mg Oral BID    meropenem (MERREM) 1 g in 0.9% sodium chloride (MBP/ADV) 50 mL MBP  1 g IntraVENous Q8H    [Held by provider] collagenase (SANTYL) 250 unit/gram ointment   Topical BID    sodium hypochlorite (QUARTER STRENGTH DAKIN'S) 0.125% irrigation (bottle)   Topical BID    melatonin (rapid dissolve) tablet 5 mg  5 mg Oral QHS PRN    sodium chloride (NS) flush 5-40 mL  5-40 mL IntraVENous Q8H    sodium chloride (NS) flush 5-40 mL  5-40 mL IntraVENous PRN    acetaminophen (TYLENOL) tablet 650 mg  650 mg Oral Q6H PRN    Or    acetaminophen (TYLENOL) suppository 650 mg  650 mg Rectal Q6H PRN    polyethylene glycol (MIRALAX) packet 17 g  17 g Oral DAILY PRN    ondansetron (ZOFRAN ODT) tablet 4 mg  4 mg Oral Q8H PRN    Or    ondansetron (ZOFRAN) injection 4 mg  4 mg IntraVENous Q6H PRN    albuterol-ipratropium (DUO-NEB) 2.5 MG-0.5 MG/3 ML  3 mL Nebulization Q6H PRN    nitroglycerin (NITROBID) 2 % ointment 0.5 Inch  0.5 Inch Topical Q6H PRN    naloxone (NARCAN) injection 0.4 mg  0.4 mg IntraVENous EVERY 2 MINUTES AS NEEDED    amLODIPine (NORVASC) tablet 10 mg  10 mg Oral DAILY    cyanocobalamin (VITAMIN B12) sublingual tablet 2,500 mcg  2,500 mcg Oral DAILY    dexAMETHasone (DECADRON) tablet 2 mg  2 mg Oral Q12H    gabapentin (NEURONTIN) capsule 300 mg  300 mg Oral BID    hydrALAZINE (APRESOLINE) tablet 25 mg  25 mg Oral TID    levothyroxine (SYNTHROID) tablet 88 mcg  88 mcg Oral 6am    atenoloL (TENORMIN) tablet 100 mg  100 mg Oral DAILY    oxyCODONE-acetaminophen (PERCOCET) 5-325 mg per tablet 1 Tablet  1 Tablet Oral Q4H PRN    pyridoxine (vitamin B6) (VITAMIN B-6) tablet 200 mg  200 mg Oral DAILY       Imaging:   XR Results (most recent):  Results from Hospital Encounter encounter on 08/01/22    XR CHEST PORT    Narrative  EXAM: XR CHEST PORT    CLINICAL INDICATION/HISTORY: 74 years Female. chest pain. Additional History: None    TECHNIQUE: Frontal view of the chest    COMPARISON: Chest radiograph 3/2/2020    FINDINGS:    The cardiac silhouette appears within normal limits. Tortuosity of the thoracic  aorta, unchanged in appearance. Pulmonary vasculature appears within normal  limits. No confluent airspace opacity is appreciated. No definite evidence of  pleural effusion or pneumothorax. No acute osseous abnormality appreciated. Impression  No radiographic evidence of acute cardiopulmonary process. CT Results (most recent):  Results from Hospital Encounter encounter on 08/01/22    CT ABD PELV W CONT    Narrative  CT Abdomen And Pelvis with Intravenous Contrast    INDICATION: Lumbar soft tissue abscess. TECHNIQUE: 5 mm collimation axial images obtained from the diaphragm to the  level of the pubic symphysis following the uneventful administration of  100 cc  of low osmolar, nonionic intravenous contrast.    Dose reduction techniques used: Automated exposure control, adjustment of the  mAs and/or kVp according to patient size, standardized low-dose protocol, and/or  iterative reconstruction technique. COMPARISON: None. ABDOMEN FINDINGS:    Lung Bases: Bibasilar atelectasis. Small pleural effusions. Liver: Normal attenuation. No evidence for mass. Gallbladder: Cholelithiasis. No biliary ductal dilatation. Pancreas: Normal attenuation without mass or ductal dilatation.     Spleen: Normal in size. No evidence of mass. .    Adrenal Glands: No evidence for mass. Kidneys:    Right: Normal enhancement. No cortical mass. No hydronephrosis. Left:  Normal enhancement. Left renal cyst.  No hydronephrosis. Lymph Nodes: Upper abdominal lymph nodes. There is a 1.3 cm lymph node in the  jesse hepatis. Aorta:   Normal in caliber    PELVIS FINDINGS:    Bowel: Small Bowel: Normal in caliber with normal wall thickness. Large Bowel: Normal in caliber with normal wall thickness. Moderate stool  burden. Appendix: Normal appendix. Bladder: Underdistended. The uterus is surgically absent. There is no suspicious adnexal mass. There is no free air. There is a small volume of ascites. There is soft tissue stranding and some ill-defined fluid in the deep  subcutaneous tissues which measures approximately 4.8 x 3.1 cm. This is in the  right lateral lumbar region. Bones: No acute finding. Impression  Soft tissue stranding and ill-defined fluid in the right paralumbar region  measuring 4.8 x 3.1 cm. Small volume of ascites in the abdomen and pelvis. No definite acute bowel abnormality. Moderate stool burden. Additional incidentals as above. MRI Results (most recent):  Results from [place] encounter on 03/31/14    MRI BREAST BX VAC ASSIST RT    Addendum 4/3/2014  4:54 PM  Addendum: Pathology changes from right breast biopsy 12:00 position-  Fibrocystic changes with florid ductal epithelial hyperplasia. The findings are benign and concordant. Narrative  MR guided left breast biopsy    HISTORY: Left breast cancer, abnormal MRI    COMPARISON: MRI March 2014    FINDINGS:    Informed consent was obtained. The risks of the procedure including but not  limited to bleeding and infection were reviewed with the patient who wished to  proceed. Preliminary pre and post contrast images were performed. 20cc  Magnevist IV was administered.     With the patient positioned prone, and following placement in the compression  device, the left breast was imaged, and the lesion in the 12:00 left breast was  targeted. Appropriate coordinates records were obtained. The skin was  cleansed and local anesthesia administered. After making a small incision in  the skin with a # 11 scalpel blade, the trocar and biopsy sheath were advanced  to the appropriate depth. After confirmation of appropriate positioning the 9  gauge 20 mm vacuum assisted biopsy needle was advanced to the appropriate  depth. Subsequently a series of approximately 12 vacuum assisted samples were  acquired in a radial fashion. Prior to removal of the sheath, using coaxial technique, a [place]  cylinder shaped clip was placed to oscar area sampled. The patient was removed from the biopsy device, the wound was dressed and post  biopsy instructions were given to the patient. The samples were sent to the  laboratory for analysis. Diagnostic Mammogram left Breast: CC and MLO views were obtained of the left  breast and document the clip at the site of sampling in the 12:00 left breast.    Complications: No complications. Specimen: 12 vacuum assisted samples  Assistant: None. EBL: 5 cc    Impression  :    Status post MRI guided biopsy of an abnormal area of enhancement in the 12:00  left breast.  Area sampled marked with a     clip. Samples sent to the  laboratory for analysis.         Labs:    Recent Results (from the past 48 hour(s))   METABOLIC PANEL, BASIC    Collection Time: 08/16/22 11:49 AM   Result Value Ref Range    Sodium 138 136 - 145 mmol/L    Potassium 4.9 3.5 - 5.5 mmol/L    Chloride 103 100 - 111 mmol/L    CO2 30 21 - 32 mmol/L    Anion gap 5 3.0 - 18 mmol/L    Glucose 136 (H) 74 - 99 mg/dL    BUN 40 (H) 7.0 - 18 MG/DL    Creatinine 0.77 0.6 - 1.3 MG/DL    BUN/Creatinine ratio 52 (H) 12 - 20      GFR est AA >60 >60 ml/min/1.73m2    GFR est non-AA >60 >60 ml/min/1.73m2    Calcium 9.2 8.5 - 10.1 MG/DL         Signed By: Moon Joe MD     August 16, 2022      I spent 25 minutes with the patient in face-to-face consultation, of which greater than 50% was spent in counseling and coordination of care as described above    Disclaimer: Sections of this note are dictated using utilizing voice recognition software. Minor typographical errors may be present. If questions arise, please do not hesitate to contact me or call our department.

## 2022-08-16 NOTE — PROGRESS NOTES
left a message for the Zh[facility] at [employer] regarding the status of Medicaid application.       ALONA Palacio

## 2022-08-16 NOTE — REHAB NOTE
ARU/IPR REFERRAL CONTACT NOTE  [id number] [facility]      Thank you for the opportunity to review this patient's case for admission to [address] [facility]. Based on our pre-admission screening:     [ x] Our Team/Medical Director is following this case. Comments:Met with patient to follow up on disposition support. When asked if patient had made contact with caregivers she reported \"not directly. \" When ask if she had begun making phone calls she said, \"no. \"  Pt will need to confirm caregivers for support at discharge. Again, Thank you for this referral. Should you have any questions please do not hesitate to call.      Sincerely,  Elias Irwin [facility]  [phone]

## 2022-08-16 NOTE — PROGRESS NOTES
Problem: Mobility Impaired (Adult and Pediatric)  Goal: *Acute Goals and Plan of Care (Insert Text)  Description: Physical Therapy Goals  Initiated 8/2/2022, re-evaluated 8/9/22 and goals adjusted and to be accomplished within 7 day(s)  1. Patient will move from supine to sit and sit to supine , scoot up and down, and roll side to side in bed with minimal assistance/contact guard assist.    2.  Patient will transfer from bed to chair and chair to bed with SBA using the least restrictive device. 3.  Patient will perform sit to stand with CGA  4. Patient will ambulate with minimal assistance/contact guard assist for 25 feet with the least restrictive device. 5.  Patient will ascend/descend stairs  as needed. 6.  Patient will perform BLE therex as prescribed    PLOF: Pt lives alone, reports she has a rollator, receives some assist from brothers      Outcome: Progressing Towards Goal   PHYSICAL THERAPY RE-EVALUATION    Patient: Dustin Remy (1 y.o. female)  Date: 8/16/2022  Primary Diagnosis: Sepsis ([facility] [address] [A41.9]  Leukocytosis [D72.829]  Open wound of lower back [S31.000A]       Precautions:   Fall, Skin      ASSESSMENT :  Based on the objective data described below, the patient presents with decreased strength, decreased safety awareness, balance impairment, and decreased independence with functional mobility. Patient seen with OT to maximize safety and participation in OOB mobility. Min A supine to sit transfer with HOB elevated and additional time. She continues to requires verbal cues to push up from seated surface as oppose to pulling up on RW to stand. Min A sit to stand transfer. Verbal cues for sequencing and management of RW. Min A for transfer to [facility]. Patient ambulates 6 ft to the chair progressing to CGA by end of session. Continue to recommend placement for discharge as patient needs increased assistance with mobility, has decreased safety awareness, and lacks support at home.      Patient will benefit from skilled intervention to address the above impairments. Patient's rehabilitation potential is considered to be Good  Factors which may influence rehabilitation potential include:   []         None noted  []         Mental ability/status  []         Medical condition  [x]         Home/family situation and support systems  [x]         Safety awareness  []         Pain tolerance/management  []         Other:      PLAN :  Recommendations and Planned Interventions:   [x]           Bed Mobility Training             [x]    Neuromuscular Re-Education  [x]           Transfer Training                   []    Orthotic/Prosthetic Training  [x]           Gait Training                          []    Modalities  [x]           Therapeutic Exercises           []    Edema Management/Control  [x]           Therapeutic Activities            [x]    Family Training/Education  [x]           Patient Education  []           Other (comment):    Frequency/Duration: Patient will be followed by physical therapy 1-2 times per day/4-7 days per week to address goals. Further Equipment Recommendations for Discharge: shower chair, rolling walker, hospital bed, and wheel chair     AMPAC:   Based on an AM-PAC score of 17/24 (**/20 if omitting stairs) and their current functional mobility deficits, it is recommended that the patient have 3-5 sessions per week of Physical Therapy at d/c to increase the patient's independence. This AMPAC score should be considered in conjunction with interdisciplinary team recommendations to determine the most appropriate discharge setting. Patient's social support, diagnosis, medical stability, and prior level of function should also be taken into consideration. SUBJECTIVE:   Patient stated I can't do it, help me.  \" Hold em , im going to fall\"    OBJECTIVE DATA SUMMARY:   Hospital course since last seen and reason for re-evaluation: Patient will continue to benefit from skilled PT to address strength, balance, and mobility deficits. 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    Cancer ([facility] [address] 01/01/2014    breast    Ductal carcinoma in situ of breast     Functional quadriplegia (HCC)     as of 7/2022    Glaucoma     History of seizure disorder     Open back wound 07/2022    Pressure wound for lying on a table for indeterminate amount of time    Pituitary adenoma with extrasellar extension ([facility] [address]     Primary hypertension      Past Surgical History:   Procedure Laterality Date    HX HYSTERECTOMY      partial    HX MASTECTOMY  4/30/2014    RIGHT PARTIAL MASTECTOMY WITH NEEDLE LOCALIZATION MAMMOGRAM SENTINEL LYMPH NODE BIOPSY performed by Jf Gomes MD at [facility] MAIN OR     Barriers to Learning/Limitations: yes;  intermittent confusion  Compensate with: Visual Cues, Verbal Cues, and Tactile Cues  Home Situation:   Home Situation  Home Environment: Private residence  # Steps to Enter: 0  One/Two Story Residence: One story  Living Alone: Yes  Support Systems: Other Family Member(s)  Patient Expects to be Discharged to[de-identified] Skilled nursing facility  Current DME Used/Available at Home: [place], quad, Walker, rolling, [place], rollator  Tub or Shower Type: Tub/Shower combination  Critical Behavior:  Neurologic State: Eyes open spontaneously  Orientation Level: Oriented X4  Cognition: Appropriate decision making; Appropriate for age attention/concentration; Appropriate safety awareness; Follows commands     Psychosocial  Patient Behaviors: Calm; Cooperative;Demanding  Purposeful Interaction: Yes  Pt Identified Daily Priority: Clinical issues (comment)  Caritas Process: Nurture loving kindness; Teaching/learning; Attend basic human needs  Reassure: Caring rounds  Skin Condition/Temp: Warm;Dry     Skin Integrity: Wound (add Wound LDA); Other (comment) (ulcers)  Skin Integumentary  Skin Condition/Temp: Warm;Dry  Skin Integrity: Wound (add Wound LDA); Other (comment) (ulcers)     Strength:    Strength: Generally decreased, functional                    Tone & Sensation:   Tone: Normal                              Range Of Motion:  AROM: Within functional limits                          Functional Mobility:  Bed Mobility:     Supine to Sit: Minimum assistance;Bed Modified     Scooting: Minimum assistance  Transfers:     Stand to Sit: Minimum assistance  Stand Pivot Transfers: Minimum assistance          Balance:   Sitting: Intact  Standing: Impaired; With support  Standing - Static: Fair  Standing - Dynamic : Fair    Ambulation/Gait Training:  Distance (ft): 10 Feet (ft)  Assistive Device: Walker, rolling  Ambulation - Level of Assistance: Minimal assistance  Gait Abnormalities: Decreased step clearance       Pain:  Pain level pre-treatment: 0/10   Pain level post-treatment: 0/10   Pain Intervention(s) : Medication (see MAR); Rest, Ice, Repositioning   Response to intervention: Nurse notified, See doc flow    Activity Tolerance:   Fair  Please refer to the flowsheet for vital signs taken during this treatment. After treatment:   [x]         Patient left in no apparent distress sitting up in chair  []         Patient left in no apparent distress in bed  [x]         Call bell left within reach  [x]         Nursing notified  []         Caregiver present  []         Bed alarm activated  []         SCDs applied    COMMUNICATION/EDUCATION:   [x]         Role of Physical Therapy in the acute care setting. [x]         Fall prevention education was provided and the patient/caregiver indicated understanding. [x]         Patient/family have participated as able in goal setting and plan of care. [x]         Patient/family agree to work toward stated goals and plan of care. []         Patient understands intent and goals of therapy, but is neutral about his/her participation.   []         Patient is unable to participate in goal setting/plan of care: ongoing with therapy staff.  []         Other: Thank you for this referral.  Willie Hernandez, PT   Time Calculation: 30 mins    [address] [facility] [address] [address] AM-PAC® Basic Mobility Inpatient Short Form (6-Clicks) Version 2    How much HELP from another person does the patient currently need    (If the patient hasn't done an activity recently, how much help from another person do you think he/she would need if he/she tried?)   Total (Total A or Dep)   A Lot  (Mod to Max A)   A Little (Sup or Min A)   None (Mod I to I)   Turning from your back to your side while in a flat bed without using bedrails? [] 1 [] 2 [x] 3 [] 4   2. Moving from lying on your back to sitting on the side of a flat bed without using bedrails? [] 1 [] 2 [x] 3 [] 4   3. Moving to and from a bed to a chair (including a wheelchair)? [] 1 [] 2 [x] 3 [] 4   4. Standing up from a chair using your arms (e.g., wheelchair, or bedside chair)? [] 1 [] 2 [x] 3 [] 4   5. Walking in hospital room? [] 1 [] 2 [x] 3 [] 4   6. Climbing 3-5 steps with a railing?+   [] 1 [x] 2 [] 3 [] 4   +If stair climbing cannot be assessed, skip item #6. Sum responses from items 1-5.

## 2022-08-16 NOTE — ROUTINE PROCESS
/Bedside shift change report given to Chrissy (oncoming nurse) by Marion Yun (offgoing nurse). Report included the following information SBAR, MAR, and Recent Results. 0929-patient is refusing the ordered dressing change at this time. States \"they (the night nurse) just changed it! \" This RN informed the patient that nursing staff will offer again later on this evening.    Lauren Villareal and Verbal shift change report given to Marion Yun (oncoming nurse) by Jarocho (offgoing nurse). Report included the following information SBAR and Recent Results.

## 2022-08-16 NOTE — PROGRESS NOTES
CM talked with April with [facility] to discuss pt's acceptance. April with [facility] stated pt's transport can be scheduled for 8/17/22. CM stated information will provided to pt and will follow-up with dc transport time and date. CM met with pt at bedside to discuss pt's  dc plan of SNF. CM stated pt was accepted at [facility] and transport was to be arranged for  Wednesday, 8/17/22. CM  will update pt with transport time. Pt is in agreement to transitioning to SNF, [facility] on Wednesday, 8/17/22.    1630- made contact with  [place] at [address] [facility] to schedule pt's dc transport.  at [address] [facility] scheduled pt to transport from [place] to [facility] on Wednesday, 8/17/22 at 12:30 PM    1646- notified Dr. Marycarmen Quiñones   with the above information via An[facility]ney. 4332-  notified April with [facility] of the above information.        Aiden Mullen, MSW  Care Manager

## 2022-08-16 NOTE — PROGRESS NOTES
CM met with pt at  bedside to discuss pt's dc plan of ARU/SNF. CM  attempted to gather information of caregiver support contact information that will assist  pt whenever dc to home. Pt did not provide caregiver support to CM. CM explained with no caregiver support provided, ARU, will decline pt's acceptance in program.  CM asked pt to provide the [facility] for SNF facilities. Pt agreed to be referred to [address] [place] [address] and [facility]. CM made referrals to [address] [place] [address] and [facility]. CM fazed face sheet and clinicals through [facility].     CM made contact with Guillermo Kelley from [facility] facilities to notify of  SNF referral.      Radha Bloom, MSW  Care Manager

## 2022-08-16 NOTE — ROUTINE PROCESS
Assumed care of patient bedside report and incontinence care -- patient in chair with alarm-- the minutes she saw this writer she was already stating \"I'm not doing this right now\" asked what did she mean -- \" we are not changing dressing now\"   we did change the pad under her --    2045-- patient called stating \"I have a date with Diamante Bhakta at 9pm\"  patient allowed a dressing change for the first since Saturday -- she always wants to wait until the morning--   Dressing changed, patient to bed (2 person assist), linens changed, gown changed -- wedges used patient to right -- meds given and snack--     2330-- vitals -- patient sleeping easily aroused but refused to turn to left stating \"I am good haily this\"  educated about turning and she said \" not while I am sleeping\"    0150-- patient checked for incontinence refused care at this time     0430-- patient rest no signs of distress     0720-- patient -- aroused incontinence care and linens changed-- attempted pure wick again but patient states \" it doesn't work but I will do it \"  Chrissy RN with dual skin check and Bedside and Verbal shift change report given to Rox Fu (oncoming nurse) by Shelby Gamez RN (offgoing nurse). Report given with SBAR, Kardex, Intake/Output, MAR, Accordion and Recent Results.

## 2022-08-16 NOTE — PROGRESS NOTES
CM met with pt at bedside to discuss pt's transition of care plan. CM asked pt did she reach out to family member or friend that will assist at home after dc. Pt stated the information was coming. CM was unable to gather a friend or family member's name that will assist. CM stated in order to proceed with the transition plan to ARU, caregiver information has to be obtained. Pt stated ok. CM will follow-up with pt after lunch.         ALONA Gan  Care Manager

## 2022-08-17 VITALS
TEMPERATURE: 97.5 F | OXYGEN SATURATION: 99 % | HEART RATE: 56 BPM | BODY MASS INDEX: 32.46 KG/M2 | WEIGHT: 183.2 LBS | HEIGHT: 63 IN | DIASTOLIC BLOOD PRESSURE: 80 MMHG | SYSTOLIC BLOOD PRESSURE: 125 MMHG | RESPIRATION RATE: 18 BRPM

## 2022-08-17 PROCEDURE — 74011000258 HC RX REV CODE- 258: Performed by: INTERNAL MEDICINE

## 2022-08-17 PROCEDURE — 99239 HOSP IP/OBS DSCHRG MGMT >30: CPT | Performed by: HOSPITALIST

## 2022-08-17 PROCEDURE — 2709999900 HC NON-CHARGEABLE SUPPLY

## 2022-08-17 PROCEDURE — 74011250637 HC RX REV CODE- 250/637: Performed by: INTERNAL MEDICINE

## 2022-08-17 PROCEDURE — 74011250636 HC RX REV CODE- 250/636: Performed by: INTERNAL MEDICINE

## 2022-08-17 PROCEDURE — 74011000250 HC RX REV CODE- 250: Performed by: INTERNAL MEDICINE

## 2022-08-17 PROCEDURE — 77030038269 HC DRN EXT URIN PURWCK BARD -A

## 2022-08-17 RX ORDER — DIVALPROEX SODIUM 500 MG/1
500 TABLET, EXTENDED RELEASE ORAL 2 TIMES DAILY
Qty: 60 TABLET | Refills: 0 | Status: SHIPPED
Start: 2022-08-17

## 2022-08-17 RX ADMIN — HYDRALAZINE HYDROCHLORIDE 25 MG: 50 TABLET, FILM COATED ORAL at 09:22

## 2022-08-17 RX ADMIN — PYRIDOXINE HCL TAB 50 MG 200 MG: 50 TAB at 09:22

## 2022-08-17 RX ADMIN — GABAPENTIN 300 MG: 300 CAPSULE ORAL at 09:22

## 2022-08-17 RX ADMIN — MEROPENEM 1 G: 1 INJECTION INTRAVENOUS at 05:37

## 2022-08-17 RX ADMIN — Medication 2500 MCG: at 09:22

## 2022-08-17 RX ADMIN — DIVALPROEX SODIUM 500 MG: 500 TABLET, EXTENDED RELEASE ORAL at 09:22

## 2022-08-17 RX ADMIN — AMLODIPINE BESYLATE 10 MG: 10 TABLET ORAL at 09:22

## 2022-08-17 RX ADMIN — DEXAMETHASONE 2 MG: 2 TABLET ORAL at 09:22

## 2022-08-17 RX ADMIN — SODIUM CHLORIDE, PRESERVATIVE FREE 10 ML: 5 INJECTION INTRAVENOUS at 06:15

## 2022-08-17 RX ADMIN — LEVOTHYROXINE SODIUM 88 MCG: 88 TABLET ORAL at 05:37

## 2022-08-17 NOTE — DISCHARGE SUMMARY
Hospitalist Discharge Summary    Patient: Kia Gallegos MRN: [id number]  [facility]: [id number]    YOB: 1955  Age: 74 y.o. Sex: female    DOA: 8/1/2022 LOS:  LOS: 16 days   Discharge Date:     Admission Diagnoses: Sepsis ([facility] [address] [A41.9]  Leukocytosis [D72.829]  Open wound of lower back [S31.000A]    Discharge Diagnoses:    Sepsis secondary to Bacteroides infection  4.8 x 3.1 cm fluid collection in the right lateral lumbar region, soft tissue abscess, source of bloodstream infection  Necrotic lower back ulcers, pressure necrosis right heel-POA  Bilateral foot blisters, left leg ulcer-POA  Brain mass likely consistent with glioblastoma, is following up with neurosurgery for further evaluation  History of hypertension  History of hypothyroidism  History of seizure disorder  History of breast cancer  Leukocytosis likely secondary to chronic steroid therapy for brain mass    Discharge Condition: Fair    Discharge To: SNF    CODE STATUS: DNR      PHYSICAL EXAM  Visit Vitals  /80 (BP 1 Location: Right arm, BP Patient Position: At rest)   Pulse (!) 56   Temp 97.5 °F (36.4 °C)   Resp 18   Ht 5' 3\" (1.6 m)   Wt 83.1 kg (183 lb 3.2 oz)   SpO2 99%   BMI 32.45 kg/m²       General: Alert, cooperative, no acute distress, blindness in right eye  Lungs:  CTA Bilaterally. No Wheezing/Rhonchi/Rales. Heart:  Regular rate and Rhythm. Abdomen: Soft, Non distended, Non tender. + Bowel sounds. Extremities: Multiple wounds present on both feet-POA  Sacral decub/right paralumbar region infection-POA  Neurologic:  AA oriented X 3. Moves all extremities. Kia Gallegos is a 74 y.o. female who presents to [facility] ER with complaint of Left Foot Blister and Left Ankle Pain. Patient reports a blister on her ankle that ruptured and drained serous fluid with a large blister on her left foot currently.   In regards to her back wound, Patient reports that she slid (vehemently denies that she \"fell\") and was found down by her Brothers and Benito Azul prior to being hospitalized in in mid-July. Patient initially did not acknowledge that she had a brain mass, but eventually did report that she is scheduled to see a Neurosurgeon regarding the matter. Patient denies weakness or difficulty moving her limbs, but reports that she is ambulatory with a Rolator at home and cannot roll aside in bed during examination without assistance. Patient reports poor appetite and 20-25 lb weight loss over approximately 9 months. Patient complains of fatigue, constipation, and occasional straining with BMs. Patient requests that the fluid be removed from her legs and speaks extensively regarding her dietary habits. Patient denies fevers, nausea, vomiting, diarrhea, dysuria, cough, chest pain, pain with inspiration, abdominal pain, LUTS, shortness of breath, orthopnea, and weight gain. Patient's Primary Oncologist is Andie Feldman. Aniyah Olvera M.D. Neurosurgeon with whom Patient is reportedly scheduled is Veena Nguyễn M.D. In [facility] ER, Patient is noted to have Temperature 99.1°F, Heart Rate 110 bpm, Respiration Rate 23 bpm, Blood Pressure 122/67 mm Hg to 152/66 mm Hg, SpO2 99% on Room Air, WBC 16.5, Neut% 78%, Neut# 12.8, UA (-), K+ 3.4, mmol/L, Glucose 109 mg/dL, BUN 16, Creatinine 1.00, eGFR 55/>60, Albumin 2.9, ALT 42, AST 36, Alk Phos 123, , Pro-, Troponin 9 ng/L, and Lactic Acid 3.95. Patient received IV Vancomycin 1,000 mg, IV Zosyn 4.5 grams, IV Levofloxacin 740 mg, and NS 1 L in [facility] ER. Notably, Patient has previously refused Hospice and also refused \"strangers in her home\" in regards to [employer].      Patient is admitted to [facility] Telemetry Unit for management of Severe Sepsis 2°/2 LLE Wound of Lower Back and also Draining Blister of Left Ankle        Hospital Course:     26-year-old female with multiple medical problems presented to the emergency room secondary to left foot blister and left ankle pain. Wound care consulted, patient noted to have sepsis, started on broad-spectrum IV antibiotics. Blood cultures noted to be positive for Bacteroides. ID consulted, patient underwent CT abdomen and pelvis which showed a soft tissue stranding with ill-defined fluid collection on the right paralumbar region likely consistent with a soft tissue abscess. General surgery consulted, patient underwent I&D with juanjose pus, necrotic muscle, skin and subcutaneous tissue to the bone. Patient treated with IV antibiotics. Per ID patient will require long-term IV antibiotic therapy, placed on IV meropenem, PICC line placed. Patient has also been diagnosed with a brain mass and is currently being worked up as outpatient with neurosurgery. She is on dexamethasone 2 mg twice daily. Case management on board for discharge planning. Patient's family visited and I had a long discussion with them. They mentioned that patient is in denial, is unable to care for herself but has been refusing to go to nursing facility. There was a delay in discharge planning secondary to patient not willing to go to skilled nursing facility. Given patient's multiple medical problems she finally acknowledged that she will not be able to care for herself and is currently being discharged to a skilled nursing facility. Follow up Care: With PCP in 2 weeks    Consults: General Surgery and Infectious Disease    Significant Diagnostic Studies:     Imaging:  XR Results (most recent):  Results from Hospital Encounter encounter on 08/01/22    XR CHEST PORT    Narrative  EXAM: XR CHEST PORT    CLINICAL INDICATION/HISTORY: 74 years Female. chest pain. Additional History: None    TECHNIQUE: Frontal view of the chest    COMPARISON: Chest radiograph 3/2/2020    FINDINGS:    The cardiac silhouette appears within normal limits. Tortuosity of the thoracic  aorta, unchanged in appearance. Pulmonary vasculature appears within normal  limits.  No confluent airspace opacity is appreciated. No definite evidence of  pleural effusion or pneumothorax. No acute osseous abnormality appreciated. Impression  No radiographic evidence of acute cardiopulmonary process. CT Results (most recent):  Results from Hospital Encounter encounter on 08/01/22    CT ABD PELV W CONT    Narrative  CT Abdomen And Pelvis with Intravenous Contrast    INDICATION: Lumbar soft tissue abscess. TECHNIQUE: 5 mm collimation axial images obtained from the diaphragm to the  level of the pubic symphysis following the uneventful administration of  100 cc  of low osmolar, nonionic intravenous contrast.    Dose reduction techniques used: Automated exposure control, adjustment of the  mAs and/or kVp according to patient size, standardized low-dose protocol, and/or  iterative reconstruction technique. COMPARISON: None. ABDOMEN FINDINGS:    Lung Bases: Bibasilar atelectasis. Small pleural effusions. Liver: Normal attenuation. No evidence for mass. Gallbladder: Cholelithiasis. No biliary ductal dilatation. Pancreas: Normal attenuation without mass or ductal dilatation. Spleen: Normal in size. No evidence of mass. .    Adrenal Glands: No evidence for mass. Kidneys:    Right: Normal enhancement. No cortical mass. No hydronephrosis. Left:  Normal enhancement. Left renal cyst.  No hydronephrosis. Lymph Nodes: Upper abdominal lymph nodes. There is a 1.3 cm lymph node in the  jesse hepatis. Aorta:   Normal in caliber    PELVIS FINDINGS:    Bowel: Small Bowel: Normal in caliber with normal wall thickness. Large Bowel: Normal in caliber with normal wall thickness. Moderate stool  burden. Appendix: Normal appendix. Bladder: Underdistended. The uterus is surgically absent. There is no suspicious adnexal mass. There is no free air. There is a small volume of ascites.     There is soft tissue stranding and some ill-defined fluid in the deep  subcutaneous tissues which measures approximately 4.8 x 3.1 cm. This is in the  right lateral lumbar region. Bones: No acute finding. Impression  Soft tissue stranding and ill-defined fluid in the right paralumbar region  measuring 4.8 x 3.1 cm. Small volume of ascites in the abdomen and pelvis. No definite acute bowel abnormality. Moderate stool burden. Additional incidentals as above. MRI Results (most recent):  Results from [place] encounter on 03/31/14    MRI BREAST BX VAC ASSIST RT    Addendum 4/3/2014  4:54 PM  Addendum: Pathology changes from right breast biopsy 12:00 position-  Fibrocystic changes with florid ductal epithelial hyperplasia. The findings are benign and concordant. Narrative  MR guided left breast biopsy    HISTORY: Left breast cancer, abnormal MRI    COMPARISON: MRI March 2014    FINDINGS:    Informed consent was obtained. The risks of the procedure including but not  limited to bleeding and infection were reviewed with the patient who wished to  proceed. Preliminary pre and post contrast images were performed. 20cc  Magnevist IV was administered. With the patient positioned prone, and following placement in the compression  device, the left breast was imaged, and the lesion in the 12:00 left breast was  targeted. Appropriate coordinates records were obtained. The skin was  cleansed and local anesthesia administered. After making a small incision in  the skin with a # 11 scalpel blade, the trocar and biopsy sheath were advanced  to the appropriate depth. After confirmation of appropriate positioning the 9  gauge 20 mm vacuum assisted biopsy needle was advanced to the appropriate  depth. Subsequently a series of approximately 12 vacuum assisted samples were  acquired in a radial fashion. Prior to removal of the sheath, using coaxial technique, a [facility]  cylinder shaped clip was placed to oscar area sampled.     The patient was removed from the biopsy device, the wound was dressed and post  biopsy instructions were given to the patient. The samples were sent to the  laboratory for analysis. Diagnostic Mammogram left Breast: CC and MLO views were obtained of the left  breast and document the clip at the site of sampling in the 12:00 left breast.    Complications: No complications. Specimen: 12 vacuum assisted samples  Assistant: None. EBL: 5 cc    Impression  :    Status post MRI guided biopsy of an abnormal area of enhancement in the 12:00  left breast.  Area sampled marked with a     clip. Samples sent to the  laboratory for analysis. Procedures:     I&D right paralumbar abscess       Current Discharge Medication List        START taking these medications    Details   meropenem 1 g IVPB 1 g by IntraVENous route every eight (8) hours for 33 days. Qty: 33 Dose, Refills: 0  Start date: 8/17/2022, End date: 9/19/2022      sodium hypochlorite (QUARTER STRENGTH DAKIN'S) 0.125 % soln external solution Apply 30 mL to affected area two (2) times a day. Qty: 1000 mL, Refills: 0  Start date: 8/17/2022           CONTINUE these medications which have CHANGED    Details   divalproex ER (DEPAKOTE ER) 500 mg ER tablet Take 1 Tablet by mouth two (2) times a day. Qty: 60 Tablet, Refills: 0  Start date: 8/17/2022           CONTINUE these medications which have NOT CHANGED    Details   amLODIPine (NORVASC) 10 mg tablet Take 10 mg by mouth in the morning. dexAMETHasone (DECADRON) 2 mg tablet Take  by mouth every twelve (12) hours. levothyroxine (SYNTHROID) 88 mcg tablet Take 88 mcg by mouth Daily (before breakfast). hydrALAZINE (APRESOLINE) 25 mg tablet Take 25 mg by mouth three (3) times daily. gabapentin (NEURONTIN) 300 mg capsule Take 1 tab PO QHS x1 week then increase to BID thereafter  Qty: 60 Cap, Refills: 1      tamoxifen (NOLVADEX) 10 mg tablet Take  by mouth daily.       oxyCODONE-acetaminophen (PERCOCET) 5-325 mg per tablet 1 or 2 tablets by mouth every 4 hours as needed  Qty: 40 Tab, Refills: 0      !! OTHER Vitamin D Po      nebivoloL (BYSTOLIC) 20 mg tablet Take  by mouth daily. !! OTHER [place] eye vitamin OTC      cyanocobalamin (VITAMIN B12) 2,500 mcg sublingual tablet Take 2,500 mcg by mouth daily. pyridoxine, vitamin B6, 200 mg tablet Take 200 mg by mouth daily. diclofenac (VOLTAREN) 1 % gel QID to affected areas PRN for pain  Qty: 500 g, Refills: 5       !! - Potential duplicate medications found. Please discuss with provider. STOP taking these medications       baclofen (LIORESAL) 10 mg tablet Comments:   Reason for Stopping:         torsemide (DEMADEX) 20 mg tablet Comments:   Reason for Stopping:         BIOTIN PO Comments:   Reason for Stopping:         diazePAM (VALIUM) 10 mg tablet Comments:   Reason for Stopping:                 Activity: Activity as tolerated    Diet: Cardiac Diet    Wound Care: As directed      Nnamdi Albright MD  8/17/2022, 12:36 PM    Total time spent 42 mins  Disclaimer: Sections of this note are dictated using utilizing voice recognition software. Minor typographical errors may be present. If questions arise, please do not hesitate to contact me or call our department.

## 2022-08-17 NOTE — ROUTINE PROCESS
Assumed care of patient sitting on bedside with dinner tray--  she stated \"just picking at this\"      1950-- patient's brother at bedside     2030-- brother leaving -- patient requesting ice for soda -- she is \"not ready to do anything now\"  still sitting on bedside     2120-- DRESSING CHANGED patient still sitting on bedside    2204-- meds and place in bed to left side     0100-- The documentation for this period is being entered following the guidelines as defined in the [address] downtime policy by Shelby eLe RN. Patient sleeping no distress    0300--patient still sleeping no distress    0500-- patient aroused asking for coffee--  No distress-- turned to supine per request    -- meds    0630-- picc line dressing changed    The documentation for this period is being entered following the guidelines as defined in the [address] downtime policy by Shelby Lee RN. Bedside and Verbal shift change report given to Chrissy RN (oncoming nurse) by Devaughn Hennessy RN (offgoing nurse). Report given with SBAR, Kardex, Intake/Output, MAR, Accordion and Recent Results.

## 2022-08-17 NOTE — PROGRESS NOTES
Problem: Falls - Risk of  Goal: *Absence of Falls  Description: Document Leafy Booth Fall Risk and appropriate interventions in the flowsheet. Outcome: Progressing Towards Goal  Note: Fall Risk Interventions:  Mobility Interventions: Patient to call before getting OOB, OT consult for ADLs, PT Consult for mobility concerns    Mentation Interventions: Adequate sleep, hydration, pain control, Door open when patient unattended, Room close to nurse's station, Update white board    Medication Interventions: Patient to call before getting OOB    Elimination Interventions: Call light in reach    History of Falls Interventions: Room close to nurse's station         Problem: Patient Education: Go to Patient Education Activity  Goal: Patient/Family Education  Outcome: Progressing Towards Goal     Problem: Pressure Injury - Risk of  Goal: *Prevention of pressure injury  Description: Document Bret Scale and appropriate interventions in the flowsheet.   Outcome: Progressing Towards Goal  Note: Pressure Injury Interventions:  Sensory Interventions: Assess changes in LOC    Moisture Interventions: Check for incontinence Q2 hours and as needed, Minimize layers    Activity Interventions: Increase time out of bed, Pressure redistribution bed/mattress(bed type), PT/OT evaluation    Mobility Interventions: PT/OT evaluation    Nutrition Interventions: Document food/fluid/supplement intake    Friction and Shear Interventions: HOB 30 degrees or less                Problem: Patient Education: Go to Patient Education Activity  Goal: Patient/Family Education  Outcome: Progressing Towards Goal     Problem: Patient Education: Go to Patient Education Activity  Goal: Patient/Family Education  Outcome: Progressing Towards Goal     Problem: Pain  Goal: *Control of Pain  Outcome: Progressing Towards Goal     Problem: Patient Education: Go to Patient Education Activity  Goal: Patient/Family Education  Outcome: Progressing Towards Goal     Problem: General Medical Care Plan  Goal: *Vital signs within specified parameters  Outcome: Progressing Towards Goal  Goal: *Labs within defined limits  Outcome: Progressing Towards Goal  Goal: *Absence of infection signs and symptoms  Outcome: Progressing Towards Goal  Goal: *Optimal pain control at patient's stated goal  Outcome: Progressing Towards Goal  Goal: *Skin integrity maintained  Outcome: Progressing Towards Goal  Goal: *Fluid volume balance  Outcome: Progressing Towards Goal

## 2022-08-17 NOTE — ROUTINE PROCESS
/Bedside shift change report given to Chrissy (oncoming nurse) by Emmanuelle Méndez (offgoing nurse). Report included the following information SBAR, MAR, and Recent Results. 0926-patient is currently refusing to have her back dressing changed. Will continue to offer throughout the shift. 1043-attempted to call report to [facility] facility. No answer from admissions office. Left a confidential voicemail with [address] [place] [address]'s call back number. 1150-made a second attempt to call report to [facility] facility. No answer. Left another voicemail. 1253-patient received by [facility] medical transport.

## 2022-08-17 NOTE — PROGRESS NOTES
Infectious Disease progress Note        Reason: Gram-negative bloodstream infection    Current abx Prior abx   Zosyn since 8/1/2022-8/9  Meropenem since 8/9/22  levofloxacin, vancomycin 8/1-8/3     Lines:       Assessment :     74 y.o. female with PMHx significant for htn, seizures, breast cancer, recently diagnosed glioblastoma with vasogenic edema (7/2022), recent hospitalization at [facility] who presented to [facility] ER on 8/1/22 with complaint of Left Foot Blister and Left Ankle Pain. [address] 7/1-7/4 for fall, diagnosed to have glioblastoma multiforme with mass effect s/p decadron    Hospitalization at [address] [facility] 7/14/22-7/22/22 for fall, AMS (declined hospice during that admission)    Clinical presentation concerning for sepsis-present on admission due to Bacteroides bloodstream infection.  -> Blood culture negative at 6 days    ->intera-abdominal source- No evidence of GI infection noted clinically , but CT a/p showed   4.8 x 3.1 cm fluid collection in the right lateral lumbar region-  Lumbar soft tissue abscess as a source of Bacteroides bloodstream infection-  ->S/p bedside I&D. 8/5. grossly necrotic wounds with juanjose pus to bone- cx positive for polyorganism- Enterobacter cloacae complex, Proteus, and Citrobacter spp as well as Bacteroides. Immunocompromise state due to concurrent steroids can mask the full clinical presentation    Surgery follow up appreciated. Plans for bedside I&D noted    Necrotic lower back ulcers, pressure necrosis right heel-likely due to prolonged period of unresponsiveness/pressure injury July 2022    bilateral foot blisters, left leg ulcer-no clinical evidence of infection noted on today's exam.  Wound culture 8/1/2022-Klebsiella, proteus, e.coli, MSSA-likely colonizer    Glioblastoma multiforme-plans for outpatient neurosurgery noted.   Currently on Decadron  Distant history of seizures-on Depakote    Recommendations:    Continue meropenem till 9/19/22, given R for Piptazo demonstrated by Enterobacter. Low risk of increased seizure activity with meropenem, drug interaction with Depakote exists-    -She had neurology's evaluation for need for AEDs  given infection extending to the the bone- she will need a long course of IV abx- 6 weeks. If unable to find facility which accepts patient with meropenem or unable to arrange for home iv antibiotics, other option is to switch patient to prolonged levofloxacin/metronidazole (risk of tendinopathy, quinolone resistance with this option)- d/w . Patient accepted at [facility] with IV meropenem. Continue local wound care left leg ulcer, pressure offloading right heel, wound care back ulcers       Poor chances of wound healing looking at the extent of infection, and need for steroids. Above plan was discussed in details with patient and primary team, . Please call me if any further questions or concerns. Will continue to participate in the care of this patient. Please call back if any further questions    HPI:    Denies any fevers, chills. No new complaints or events.      Current Facility-Administered Medications   Medication Dose Route Frequency    divalproex ER (DEPAKOTE ER) 24 hour tablet 500 mg  500 mg Oral BID    meropenem (MERREM) 1 g in 0.9% sodium chloride (MBP/ADV) 50 mL MBP  1 g IntraVENous Q8H    [Held by provider] collagenase (SANTYL) 250 unit/gram ointment   Topical BID    sodium hypochlorite (QUARTER STRENGTH DAKIN'S) 0.125% irrigation (bottle)   Topical BID    melatonin (rapid dissolve) tablet 5 mg  5 mg Oral QHS PRN    sodium chloride (NS) flush 5-40 mL  5-40 mL IntraVENous Q8H    sodium chloride (NS) flush 5-40 mL  5-40 mL IntraVENous PRN    acetaminophen (TYLENOL) tablet 650 mg  650 mg Oral Q6H PRN    Or    acetaminophen (TYLENOL) suppository 650 mg  650 mg Rectal Q6H PRN    polyethylene glycol (MIRALAX) packet 17 g  17 g Oral DAILY PRN    ondansetron (ZOFRAN ODT) tablet 4 mg  4 mg Oral Q8H PRN Or    ondansetron (ZOFRAN) injection 4 mg  4 mg IntraVENous Q6H PRN    albuterol-ipratropium (DUO-NEB) 2.5 MG-0.5 MG/3 ML  3 mL Nebulization Q6H PRN    nitroglycerin (NITROBID) 2 % ointment 0.5 Inch  0.5 Inch Topical Q6H PRN    naloxone (NARCAN) injection 0.4 mg  0.4 mg IntraVENous EVERY 2 MINUTES AS NEEDED    amLODIPine (NORVASC) tablet 10 mg  10 mg Oral DAILY    cyanocobalamin (VITAMIN B12) sublingual tablet 2,500 mcg  2,500 mcg Oral DAILY    dexAMETHasone (DECADRON) tablet 2 mg  2 mg Oral Q12H    gabapentin (NEURONTIN) capsule 300 mg  300 mg Oral BID    hydrALAZINE (APRESOLINE) tablet 25 mg  25 mg Oral TID    levothyroxine (SYNTHROID) tablet 88 mcg  88 mcg Oral 6am    atenoloL (TENORMIN) tablet 100 mg  100 mg Oral DAILY    oxyCODONE-acetaminophen (PERCOCET) 5-325 mg per tablet 1 Tablet  1 Tablet Oral Q4H PRN    pyridoxine (vitamin B6) (VITAMIN B-6) tablet 200 mg  200 mg Oral DAILY       Allergies: Patient has no known allergies. Temp (24hrs), Av °F (36.7 °C), Min:97.6 °F (36.4 °C), Max:98.4 °F (36.9 °C)    Visit Vitals  /82 (BP 1 Location: Right arm, BP Patient Position: Sitting)   Pulse 78   Temp 97.9 °F (36.6 °C)   Resp 17   Ht 5' 3\" (1.6 m)   Wt 83.1 kg (183 lb 3.2 oz)   SpO2 98%   BMI 32.45 kg/m²       ROS: 12 point ROS obtained in details. Pertinent positives as mentioned in HPI,   otherwise negative    Physical Exam:    General:  female patient laying on the bed AAOx3 in no acute distress. General:   awake alert and oriented   HEENT:  Normocephalic, atraumatic, right eye cataract?,   Dentition good. Neck supple, no bruits. Lymph Nodes:   Not examined   Lungs:   non-labored, bilateral chest movements equal, no audible wheezing   Heart:  RRR, s1 and s2; no  rubs or gallops, no edema   Abdomen:  soft, non-distended, Non-tender   Genitourinary:  No galvez   Extremities:   superficial pressure necrosis right heel, blister on dorsal aspect of left foot-no surrounding erythema.   Superficial ulceration medial aspect of right leg with red granulation tissue-no purulent drainage/no surrounding erythema, muscle mass appropriate for age                            Skin:  Skin changes bilateral lower extremity as mentioned above. Linear ulceration lumbar region with dry necrotic tissue-no surrounding erythema, no purulent drainage; dry necrotic ulcer upper back; superficial ulcer right buttock- no drainage   Back: Stage IV decubitus pressure ulcer of the back-down to muscle fascia-wound looks clean out any signs of active infection   Psychiatric:  No suicidal or homicidal ideations, appropriate mood and affect         Labs: Results:   Chemistry Recent Labs     08/16/22  1149   *      K 4.9      CO2 30   BUN 40*   CREA 0.77   CA 9.2   AGAP 5   BUCR 52*        CBC w/Diff Recent Labs     08/16/22  1515   WBC 23.7*   RBC 4.17*   HGB 12.5   HCT 38.1      GRANS 81*   LYMPH 15*   EOS 0        Microbiology No results for input(s): CULT in the last 72 hours. RADIOLOGY:    All available imaging studies/reports in [facility] for this admission were reviewed      Disclaimer: Sections of this note are dictated utilizing voice recognition software, which may have resulted in some phonetic based errors in grammar and contents. Even though attempts were made to correct all the mistakes, some may have been missed, and remained in the body of the document. If questions arise, please contact our department.       August 16, 2022

## 2022-08-17 NOTE — DISCHARGE INSTRUCTIONS
DISCHARGE SUMMARY from Nurse    PATIENT INSTRUCTIONS:      What to do at Home:  Recommended activity: Activity as tolerated    If you experience any of the following symptoms shortness of breath, chest pain, dizziness, etc please follow up with 911. *  Please give a list of your current medications to your Primary Care Provider. *  Please update this list whenever your medications are discontinued, doses are      changed, or new medications (including over-the-counter products) are added. *  Please carry medication information at all times in case of emergency situations. These are general instructions for a healthy lifestyle:    No smoking/ No tobacco products/ Avoid exposure to second hand smoke  Surgeon General's Warning:  Quitting smoking now greatly reduces serious risk to your health. Obesity, smoking, and sedentary lifestyle greatly increases your risk for illness    A healthy diet, regular physical exercise & weight monitoring are important for maintaining a healthy lifestyle    You may be retaining fluid if you have a history of heart failure or if you experience any of the following symptoms:  Weight gain of 3 pounds or more overnight or 5 pounds in a week, increased swelling in our hands or feet or shortness of breath while lying flat in bed. Please call your doctor as soon as you notice any of these symptoms; do not wait until your next office visit. The discharge information has been reviewed with the patient. The patient verbalized understanding. Discharge medications reviewed with the patient and appropriate educational materials and side effects teaching were provided.   ___________________________________________________________________________________________________________________________________

## 2022-08-23 NOTE — PROGRESS NOTES
Physician Progress Note      PATIENTDarshekhar Hyde  CSN #:                  [id number]  :                       1955  ADMIT DATE:       2022 1:32 PM  [address] [place] DATE:        2022 1:03 PM  RESPONDING  PROVIDER #:        Karuna Quiroga MD [facility] MD          QUERY TEXT:    Patient was diagnosed with a glioblastoma in July. Imaging showed vasogenic edema. Patient being treated with Decadron. After further review, can the imaging finding of cerebral edema be further specified as: The medical record reflects the following:  Risk Factors: Gioblastoma    Clinical Indicators:  Attending Progress Notes: Brain mass-patient currently has a neurosurgical appointment, was supposed to undergo biopsy for possible glioblastoma however it is currently pending. Continue dexamethasone 2 mg twice daily. Per patient's brothers she has been given timeframe of approximately 9 months to live    Treatment: continued home dexamethasone dose twice daily    Thank you,  Dorenda Bumpers BSN, RN, CRCR  Please contact me for any questions or concerns regarding this query at [email or web address]  Options provided:  -- Clinically significant cerebral edema  -- Clinically insignificant cerebral edema  -- Other - I will add my own diagnosis  -- Disagree - Not applicable / Not valid  -- Disagree - Clinically unable to determine / Unknown  -- Refer to Clinical Documentation Reviewer    PROVIDER RESPONSE TEXT:    Provider disagreed with this query. This does not pertain to why she was here    Query created by: Elba Ann on 2022 10:14 AM      QUERY TEXT:    Patient admitted with Sepsis secondary to bacteroides infection. Per DC summary Leukocytosis likely secondary to chronic steroid therapy for brain mass. In order to support the diagnosis of Sepsis, please include additional clinical indicators in your documentation. Or please document if the diagnosis of Sepsis has been ruled out after further study.     The medical record reflects the following:  Risk Factors: 73 y/o, brain mass, foot blisters, necrotic lower back ulcers, fluid collection in the right lateral lumbar region, soft tissue abscess, Bloodstream infection    Clinical Indicators:  Per DC Summary: 10. Leukocytosis likely secondary to chronic steroid therapy for brain mass  Per ER Note:  presentation today with elevated leukocytosis and lactic acid  POA: WBC 16.5;  Lactic Acid 3.95    Treatment: IV ABX, wound and blood cultures, serial CBCs, ID consult, PICC line for long term ABX, wound debridement by surgery    Thank you,  Ada Barr BSN, RN, CRCR  Please contact me for any questions or concerns regarding this query at [email or web address]  Options provided:  -- Sepsis present as evidenced by, Please document evidence.   -- Sepsis was ruled out  -- Other - I will add my own diagnosis  -- Disagree - Not applicable / Not valid  -- Disagree - Clinically unable to determine / Unknown  -- Refer to Clinical Documentation Reviewer    PROVIDER RESPONSE TEXT:    Sepsis is present as evidenced by 2y to Bacteroides bloodstream infection - Sepsis is not based solely on leukocytosis    Query created by: Nicolas Barksdale on 8/23/2022 10:24 AM      Electronically signed by:  Lexi Samuels MD [facility] MD 8/23/2022 12:17 PM